# Patient Record
Sex: MALE | Race: WHITE | NOT HISPANIC OR LATINO | Employment: OTHER | URBAN - METROPOLITAN AREA
[De-identification: names, ages, dates, MRNs, and addresses within clinical notes are randomized per-mention and may not be internally consistent; named-entity substitution may affect disease eponyms.]

---

## 2018-11-14 PROCEDURE — 99285 EMERGENCY DEPT VISIT HI MDM: CPT

## 2018-11-15 ENCOUNTER — HOSPITAL ENCOUNTER (INPATIENT)
Facility: HOSPITAL | Age: 53
LOS: 5 days | Discharge: HOME | DRG: 885 | End: 2018-11-20
Attending: PSYCHIATRY & NEUROLOGY | Admitting: PSYCHIATRY & NEUROLOGY

## 2018-11-15 ENCOUNTER — HOSPITAL ENCOUNTER (EMERGENCY)
Facility: HOSPITAL | Age: 53
End: 2018-11-15
Attending: EMERGENCY MEDICINE | Admitting: EMERGENCY MEDICINE

## 2018-11-15 VITALS
WEIGHT: 195 LBS | RESPIRATION RATE: 18 BRPM | DIASTOLIC BLOOD PRESSURE: 69 MMHG | OXYGEN SATURATION: 98 % | TEMPERATURE: 99 F | SYSTOLIC BLOOD PRESSURE: 114 MMHG | HEART RATE: 78 BPM | BODY MASS INDEX: 30.54 KG/M2

## 2018-11-15 DIAGNOSIS — G47.00 INSOMNIA: ICD-10-CM

## 2018-11-15 DIAGNOSIS — F32.A DEPRESSION: ICD-10-CM

## 2018-11-15 DIAGNOSIS — R45.851 SUICIDAL IDEATION: ICD-10-CM

## 2018-11-15 DIAGNOSIS — R45.851 SUICIDAL IDEATIONS: ICD-10-CM

## 2018-11-15 DIAGNOSIS — R45.851 SUICIDAL IDEATIONS: Primary | ICD-10-CM

## 2018-11-15 DIAGNOSIS — F33.2 SEVERE EPISODE OF RECURRENT MAJOR DEPRESSIVE DISORDER, WITHOUT PSYCHOTIC FEATURES (HCC): Primary | ICD-10-CM

## 2018-11-15 DIAGNOSIS — F41.9 ANXIETY: ICD-10-CM

## 2018-11-15 DIAGNOSIS — F41.1 GENERALIZED ANXIETY DISORDER: ICD-10-CM

## 2018-11-15 LAB
ALBUMIN SERPL BCP-MCNC: 3.8 G/DL (ref 3.5–5)
ALP SERPL-CCNC: 70 U/L (ref 46–116)
ALT SERPL W P-5'-P-CCNC: 51 U/L (ref 12–78)
AMPHETAMINES SERPL QL SCN: NEGATIVE
ANION GAP SERPL CALCULATED.3IONS-SCNC: 9 MMOL/L (ref 4–13)
APAP SERPL-MCNC: 4 UG/ML (ref 10–30)
AST SERPL W P-5'-P-CCNC: 20 U/L (ref 5–45)
BARBITURATES UR QL: NEGATIVE
BASOPHILS # BLD AUTO: 0.04 THOUSANDS/ΜL (ref 0–0.1)
BASOPHILS NFR BLD AUTO: 0 % (ref 0–1)
BENZODIAZ UR QL: POSITIVE
BILIRUB SERPL-MCNC: 0.3 MG/DL (ref 0.2–1)
BUN SERPL-MCNC: 30 MG/DL (ref 5–25)
CALCIUM SERPL-MCNC: 9.1 MG/DL (ref 8.3–10.1)
CHLORIDE SERPL-SCNC: 104 MMOL/L (ref 100–108)
CO2 SERPL-SCNC: 26 MMOL/L (ref 21–32)
COCAINE UR QL: NEGATIVE
CREAT SERPL-MCNC: 1.3 MG/DL (ref 0.6–1.3)
EOSINOPHIL # BLD AUTO: 0.19 THOUSAND/ΜL (ref 0–0.61)
EOSINOPHIL NFR BLD AUTO: 2 % (ref 0–6)
ERYTHROCYTE [DISTWIDTH] IN BLOOD BY AUTOMATED COUNT: 12.1 % (ref 11.6–15.1)
ETHANOL SERPL-MCNC: <3 MG/DL (ref 0–3)
GAS + CO PNL BLDA: 2.5 % (ref 0–1.5)
GFR SERPL CREATININE-BSD FRML MDRD: 62 ML/MIN/1.73SQ M
GLUCOSE SERPL-MCNC: 105 MG/DL (ref 65–140)
HCT VFR BLD AUTO: 46 % (ref 36.5–49.3)
HGB BLD-MCNC: 15.7 G/DL (ref 12–17)
IMM GRANULOCYTES # BLD AUTO: 0.08 THOUSAND/UL (ref 0–0.2)
IMM GRANULOCYTES NFR BLD AUTO: 1 % (ref 0–2)
LYMPHOCYTES # BLD AUTO: 2.49 THOUSANDS/ΜL (ref 0.6–4.47)
LYMPHOCYTES NFR BLD AUTO: 23 % (ref 14–44)
MCH RBC QN AUTO: 30.1 PG (ref 26.8–34.3)
MCHC RBC AUTO-ENTMCNC: 34.1 G/DL (ref 31.4–37.4)
MCV RBC AUTO: 88 FL (ref 82–98)
METHADONE UR QL: NEGATIVE
MONOCYTES # BLD AUTO: 1.07 THOUSAND/ΜL (ref 0.17–1.22)
MONOCYTES NFR BLD AUTO: 10 % (ref 4–12)
NEUTROPHILS # BLD AUTO: 7.1 THOUSANDS/ΜL (ref 1.85–7.62)
NEUTS SEG NFR BLD AUTO: 64 % (ref 43–75)
NRBC BLD AUTO-RTO: 0 /100 WBCS
OPIATES UR QL SCN: NEGATIVE
PCP UR QL: NEGATIVE
PLATELET # BLD AUTO: 229 THOUSANDS/UL (ref 149–390)
PMV BLD AUTO: 8.5 FL (ref 8.9–12.7)
POTASSIUM SERPL-SCNC: 4.1 MMOL/L (ref 3.5–5.3)
PROT SERPL-MCNC: 6.7 G/DL (ref 6.4–8.2)
RBC # BLD AUTO: 5.21 MILLION/UL (ref 3.88–5.62)
SALICYLATES SERPL-MCNC: <3 MG/DL (ref 3–20)
SODIUM SERPL-SCNC: 139 MMOL/L (ref 136–145)
THC UR QL: NEGATIVE
TSH SERPL DL<=0.05 MIU/L-ACNC: 3.05 UIU/ML (ref 0.36–3.74)
WBC # BLD AUTO: 10.97 THOUSAND/UL (ref 4.31–10.16)

## 2018-11-15 PROCEDURE — 80053 COMPREHEN METABOLIC PANEL: CPT | Performed by: EMERGENCY MEDICINE

## 2018-11-15 PROCEDURE — 85025 COMPLETE CBC W/AUTO DIFF WBC: CPT | Performed by: EMERGENCY MEDICINE

## 2018-11-15 PROCEDURE — 36415 COLL VENOUS BLD VENIPUNCTURE: CPT | Performed by: EMERGENCY MEDICINE

## 2018-11-15 PROCEDURE — 80329 ANALGESICS NON-OPIOID 1 OR 2: CPT | Performed by: EMERGENCY MEDICINE

## 2018-11-15 PROCEDURE — 84443 ASSAY THYROID STIM HORMONE: CPT | Performed by: EMERGENCY MEDICINE

## 2018-11-15 PROCEDURE — 80320 DRUG SCREEN QUANTALCOHOLS: CPT | Performed by: EMERGENCY MEDICINE

## 2018-11-15 PROCEDURE — 99252 IP/OBS CONSLTJ NEW/EST SF 35: CPT | Performed by: PHYSICIAN ASSISTANT

## 2018-11-15 PROCEDURE — 82375 ASSAY CARBOXYHB QUANT: CPT | Performed by: EMERGENCY MEDICINE

## 2018-11-15 PROCEDURE — 80307 DRUG TEST PRSMV CHEM ANLYZR: CPT | Performed by: EMERGENCY MEDICINE

## 2018-11-15 PROCEDURE — 99223 1ST HOSP IP/OBS HIGH 75: CPT | Performed by: PSYCHIATRY & NEUROLOGY

## 2018-11-15 RX ORDER — MAGNESIUM HYDROXIDE/ALUMINUM HYDROXICE/SIMETHICONE 120; 1200; 1200 MG/30ML; MG/30ML; MG/30ML
30 SUSPENSION ORAL EVERY 4 HOURS PRN
Status: CANCELLED | OUTPATIENT
Start: 2018-11-15

## 2018-11-15 RX ORDER — OLANZAPINE 5 MG/1
5 TABLET ORAL
Status: DISCONTINUED | OUTPATIENT
Start: 2018-11-15 | End: 2018-11-17

## 2018-11-15 RX ORDER — BENZTROPINE MESYLATE 1 MG/1
1 TABLET ORAL EVERY 6 HOURS PRN
Status: DISCONTINUED | OUTPATIENT
Start: 2018-11-15 | End: 2018-11-20 | Stop reason: HOSPADM

## 2018-11-15 RX ORDER — DIAZEPAM 5 MG/1
5 TABLET ORAL EVERY 6 HOURS PRN
COMMUNITY
End: 2018-11-20 | Stop reason: HOSPADM

## 2018-11-15 RX ORDER — TRAZODONE HYDROCHLORIDE 50 MG/1
50 TABLET ORAL
Status: DISCONTINUED | OUTPATIENT
Start: 2018-11-15 | End: 2018-11-20 | Stop reason: HOSPADM

## 2018-11-15 RX ORDER — DIAZEPAM 5 MG/1
5 TABLET ORAL ONCE
Status: DISCONTINUED | OUTPATIENT
Start: 2018-11-15 | End: 2018-11-15 | Stop reason: HOSPADM

## 2018-11-15 RX ORDER — ACETAMINOPHEN 325 MG/1
650 TABLET ORAL EVERY 6 HOURS PRN
Status: CANCELLED | OUTPATIENT
Start: 2018-11-15

## 2018-11-15 RX ORDER — HYDROXYZINE HYDROCHLORIDE 25 MG/1
25 TABLET, FILM COATED ORAL EVERY 4 HOURS PRN
Status: CANCELLED | OUTPATIENT
Start: 2018-11-15

## 2018-11-15 RX ORDER — BENZTROPINE MESYLATE 1 MG/ML
1 INJECTION INTRAMUSCULAR; INTRAVENOUS EVERY 6 HOURS PRN
Status: DISCONTINUED | OUTPATIENT
Start: 2018-11-15 | End: 2018-11-20 | Stop reason: HOSPADM

## 2018-11-15 RX ORDER — LORAZEPAM 1 MG/1
1 TABLET ORAL EVERY 4 HOURS PRN
Status: DISCONTINUED | OUTPATIENT
Start: 2018-11-15 | End: 2018-11-20 | Stop reason: HOSPADM

## 2018-11-15 RX ORDER — MAGNESIUM HYDROXIDE/ALUMINUM HYDROXICE/SIMETHICONE 120; 1200; 1200 MG/30ML; MG/30ML; MG/30ML
30 SUSPENSION ORAL EVERY 4 HOURS PRN
Status: DISCONTINUED | OUTPATIENT
Start: 2018-11-15 | End: 2018-11-20 | Stop reason: HOSPADM

## 2018-11-15 RX ORDER — LORAZEPAM 2 MG/ML
2 INJECTION INTRAMUSCULAR EVERY 4 HOURS PRN
Status: CANCELLED | OUTPATIENT
Start: 2018-11-15

## 2018-11-15 RX ORDER — TRAZODONE HYDROCHLORIDE 50 MG/1
50 TABLET ORAL
Status: CANCELLED | OUTPATIENT
Start: 2018-11-15

## 2018-11-15 RX ORDER — ACETAMINOPHEN 325 MG/1
650 TABLET ORAL EVERY 6 HOURS PRN
Status: DISCONTINUED | OUTPATIENT
Start: 2018-11-15 | End: 2018-11-20 | Stop reason: HOSPADM

## 2018-11-15 RX ORDER — BENZTROPINE MESYLATE 1 MG/ML
1 INJECTION INTRAMUSCULAR; INTRAVENOUS EVERY 6 HOURS PRN
Status: CANCELLED | OUTPATIENT
Start: 2018-11-15

## 2018-11-15 RX ORDER — ESCITALOPRAM OXALATE 5 MG/1
5 TABLET ORAL DAILY
Status: DISCONTINUED | OUTPATIENT
Start: 2018-11-15 | End: 2018-11-16

## 2018-11-15 RX ORDER — ZIPRASIDONE HYDROCHLORIDE 80 MG/1
80 CAPSULE ORAL 2 TIMES DAILY WITH MEALS
COMMUNITY
End: 2018-11-20 | Stop reason: HOSPADM

## 2018-11-15 RX ORDER — BENZTROPINE MESYLATE 0.5 MG/1
1 TABLET ORAL EVERY 6 HOURS PRN
Status: CANCELLED | OUTPATIENT
Start: 2018-11-15

## 2018-11-15 RX ORDER — HYDROXYZINE HYDROCHLORIDE 25 MG/1
25 TABLET, FILM COATED ORAL EVERY 4 HOURS PRN
Status: DISCONTINUED | OUTPATIENT
Start: 2018-11-15 | End: 2018-11-20 | Stop reason: HOSPADM

## 2018-11-15 RX ORDER — LORAZEPAM 1 MG/1
1 TABLET ORAL EVERY 4 HOURS PRN
Status: CANCELLED | OUTPATIENT
Start: 2018-11-15

## 2018-11-15 RX ORDER — OLANZAPINE 2.5 MG/1
5 TABLET ORAL
Status: CANCELLED | OUTPATIENT
Start: 2018-11-15

## 2018-11-15 RX ORDER — LORAZEPAM 2 MG/ML
2 INJECTION INTRAMUSCULAR EVERY 4 HOURS PRN
Status: DISCONTINUED | OUTPATIENT
Start: 2018-11-15 | End: 2018-11-20 | Stop reason: HOSPADM

## 2018-11-15 RX ADMIN — ESCITALOPRAM OXALATE 5 MG: 5 TABLET, FILM COATED ORAL at 11:46

## 2018-11-15 RX ADMIN — TRAZODONE HYDROCHLORIDE 50 MG: 50 TABLET ORAL at 22:04

## 2018-11-15 RX ADMIN — LORAZEPAM 1 MG: 1 TABLET ORAL at 15:06

## 2018-11-15 NOTE — ED NOTES
Pt laying on bed resting watching TV  Will continue to monitor pt        Robles Marino  11/15/18 8896

## 2018-11-15 NOTE — CASE MANAGEMENT
CM outreached to Psychiatry- [facility] located @ [address] [place] [address], [address] [place] [address] [place]) [phone] ([phone] and spoke w/ Ever Betancourt who confirmed that PT was seen yesterday for the 1st time by Yo Mccauley  CM provided admission notification and Ever Betancourt communicated that the office was concerned about PT and were in support of PT's I/P [facility] admission  Ever Betancourt agreed to update the staff and CAMDEN agreed to be in contact regarding progression in stabilization and D/C planning

## 2018-11-15 NOTE — CONSULTS
Consultation - Nikky Wilson 46 y o  male MRN: [id number]    Unit/Bed#: [facility] 258-01 Encounter: [id number]      Assessment/Plan   Depression with SI  Medically cleared for inpatient psychiatric evaluation and treatment    History of Present Illness   HPI: Nikky Wilson is a 46y o  year old male who presents with increased depression and SI  He denies chronic health conditions, denies recent illness, open wounds or pain  Inpatient consult for Medical Clearance for [facility] patient  Consult performed by: Rickie Hickey  Consult ordered by: Melanie Jackson          Review of Systems   Constitutional: Negative for activity change, appetite change, chills, fatigue and fever  HENT: Negative  Eyes: Negative  Respiratory: Negative  Cardiovascular: Negative  Gastrointestinal: Negative  Genitourinary: Negative  Musculoskeletal: Negative  Neurological: Negative  Psychiatric/Behavioral: Positive for decreased concentration, dysphoric mood and suicidal ideas  Historical Information   Past Medical History:   Diagnosis Date    Anxiety     Depression      No past surgical history on file  Social History   History   Alcohol Use No     History   Drug Use No     History   Smoking Status    Never Smoker   Smokeless Tobacco    Not on file     Family History: non-contributory    Meds/Allergies   PTA meds:   Prior to Admission Medications   Prescriptions Last Dose Informant Patient Reported? Taking?   diazepam (VALIUM) 5 mg tablet   Yes No   Sig: Take 5 mg by mouth every 6 (six) hours as needed for anxiety   ziprasidone (GEODON) 80 mg capsule   Yes No   Sig: Take 80 mg by mouth 2 (two) times a day with meals      Facility-Administered Medications: None     No Known Allergies    Objective   Vitals: Blood pressure 115/56, pulse 68, temperature 98 °F (36 7 °C), temperature source Tympanic, resp  rate 18, SpO2 94 %    No intake or output data in the 24 hours ending 11/15/18 1129  Invasive Devices No matching active lines, drains, or airways          Physical Exam   Constitutional: He is oriented to person, place, and time  He appears well-developed and well-nourished  No distress  HENT:   Head: Normocephalic and atraumatic  Eyes: Pupils are equal, round, and reactive to light  EOM are normal    Neck: Normal range of motion  Cardiovascular: Normal rate and regular rhythm  Exam reveals no gallop and no friction rub  No murmur heard  Pulmonary/Chest: Effort normal  He has no wheezes  He has no rales  Abdominal: Soft  He exhibits no distension  There is no tenderness  There is no rebound  Musculoskeletal: Normal range of motion  Neurological: He is alert and oriented to person, place, and time  Skin: Skin is warm  Psychiatric: Thought content normal  His speech is delayed  He is slowed  He exhibits a depressed mood  Lab Results: I have personally reviewed pertinent reports  Imaging Studies: I have personally reviewed pertinent reports

## 2018-11-15 NOTE — ED NOTES
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

## 2018-11-15 NOTE — ED NOTES
Report called to Carol at [facility]  Pt  Left dept  , awake and alert, no distress        Nargis John, RN  11/15/18 1974

## 2018-11-15 NOTE — PROGRESS NOTES
201 from [place] Kumar for SI with plan to poison self with carbon monoxide from his car  States he did not act on his plan  CO2 levels elevated in his ED lab work  States it's from the ambulance exhaust  Reports SI off and on since divorce in 2015  Additional stressors are his job and not seeing his kids  Treating outpatient with Valium and Geodon for anxiety and depression  First inpatient hospitalization  Good support system  UDS neg  Upon admission patient appears very depressed but is cooperative  Denies current SI/HI/AVH  Seclusive to room but comes out for meals  Observed skin irritation under L breast  Pt states he was shaved for an EKG prior to admission

## 2018-11-15 NOTE — CASE MANAGEMENT
PT was referred to [address] on a 201 from [facility] after PT presented to the ED by EMS after the police were allegedly notified by PT's  after PT called his  where PT reported SI w/ a plan to take pills and sit in his car w/ the garage w/ the windows up for carbon monoxide poisioning  Records indicate that PT took (2) Geodon, (2) Tylenol PM, (2) sleeping pills, and (2) valium as a SA  Records indicate PT has struggled w/ SI since 2015 when PT got  and reports that since then he feels like his children have all turned against him and reported estranged relationships  CM met w/ PT today  CM reviewed PT's [place] plan  PT signed [address] [id number] plan  Copy of [address] [address] plan placed to be filed in medical records  PT confirms that he resides at [address] [facility] [address] [facility] in [facility] w/ his dog  PT reports that his Aunt and Cyrena Linden are caring for PT's dog at this time  PT reports that upon D/C from [address] [place] [address], he is able to return to his residence  PT reports that the best telephone number to get in contact w/ PT is (c) [phone]  PT confirms having an active drivers licenses and confirms having his own means of transportation  PT reports that upon D/C from I/P [facility], PT's family will be able to support PT in transportation services home  PT reports that he just saw a psychiatrist yesterday for the first time named Dr Albin Boyce, in [place], [place]  PT denies knowing the name of the practice  CM believes that this could be Dr Long Been in [place], [place]  PT agreed to allow CM to call to verify this  PT reports having a PCP named Dr [place] Bob  PT denies having any other forms of community service providers including therapy, community case management, peer supports, etc      PT denies having a HX of I/P [facility] admissions and denies having a HX of O/P [address] [place] [address]; however, MD informed CM that PT disclosed that PT is a previous PT of psychiatrist Dr Clem Bamberger       PT reports that he is currently self-employed as a part-time   PT confirms that he is currently insured through a private health insurance of [employer]  PT denies having behavioral health coverage  PT confirms having active RX coverage and reports using [facility] in [place], [place] as a local pharmacy  CM reviewed the process for a referral to [facility] to assist PT in applying for MA for PT's hospitalization, etc  PT verbalized being in support of this planning  PT denies having any medical issues  PT denies having a HX of any seizures  PT reports that he is legally  w/ (3) adult children (1) son and (2) daughters that PT reports having an estranged relationship w/ as a result of the divorce  PT reports having natural supports through both immediate and extended family including PT's parents, Aunt, and Cousin  PT reports having some friends as support and also reports being a member of a local [employer] that PT receives support through  PT denies having a family HX of any mental illness or drug and alcohol addiction  PT reports that he identifies as Anglican in regards to Jain and/or spiritual beliefs  PT reports that his highest level of completed education is law school [employer] graduated in [employer]  PT denies having any access to any weapons or firearms  PT denies having a legal POA, legal guardian, mental health advanced directive, rep-payee, etc      PT denies any issues related to drugs and alcohol and declines the need for a referral  UDS upon admission was (+) BZO  CM reviewed different levels of services w/ PT and PT reports being open to O/P [address] [place] including psychiatric medication management, therapy, and PCP  CM agreed to outreach to PT's natural supports and providers and follow up w/ PT accordingly regarding progression in stabilization and D/C planning       PT signed SERENA's for the following:     PCP- Jose Alberto Snyder located @ [address] [place] [address] V) [phone] (L) [phone]    Cousin- Saran Schneider (Address Unknown) (B) [phone]    Psychiatry- [facility] located @ [address] [facility] [place], [address] [place] [address] N) [phone] (J) [phone]

## 2018-11-15 NOTE — PROGRESS NOTES
Medication note:  Patient received ativan 1 mg at 1500 after c/o increased anxiety  Follow up with patient at 1415 and pt in room lying down, states feeling better with less anxiety

## 2018-11-15 NOTE — ED PROVIDER NOTES
History  Chief Complaint   Patient presents with    Psychiatric Evaluation     Patient brought in by EMS for SI  Per EMS, PD was called to patient's house where he had a plan to take pills and sit in his car in the garage with windows up  Patient stated, "I guess the  I spoke with earlier called the police " Does admit to having thoughts of SI with plan to take pills and "kill myself by carbon monoxide poisioning " Patient did take 2 tablets of geodon, 2 tablets of tylenol pm, 2 tabs of "a sleeping pill", and 2 tablets of valium tonight to go along with his plan  No HI  Patient is a 46year old male with increased depression and suicidal ideation tonight  Has had this for years  Thoughts of dying by carbon monoxide poisoning  Took 2 pills of multiple medications tonight  No N/V  No fever  (+) anxiety  No recent old records from this ED seen on computer system  [facility] website checked on this patient and last Rx filled was on 10/13/18 for valium for 20 day supply  History provided by:  Patient and EMS personnel   used: No    Psychiatric Evaluation   Presenting symptoms: suicidal thoughts    Associated symptoms: anxiety        Prior to Admission Medications   Prescriptions Last Dose Informant Patient Reported? Taking?   diazepam (VALIUM) 5 mg tablet   Yes Yes   Sig: Take 5 mg by mouth every 6 (six) hours as needed for anxiety   ziprasidone (GEODON) 80 mg capsule   Yes Yes   Sig: Take 80 mg by mouth 2 (two) times a day with meals      Facility-Administered Medications: None       Past Medical History:   Diagnosis Date    Anxiety     Depression        History reviewed  No pertinent surgical history  History reviewed  No pertinent family history  I have reviewed and agree with the history as documented      Social History   Substance Use Topics    Smoking status: Never Smoker    Smokeless tobacco: Not on file    Alcohol use No        Review of Systems Constitutional: Negative for fever  Gastrointestinal: Negative for nausea and vomiting  Psychiatric/Behavioral: Positive for dysphoric mood and suicidal ideas  The patient is nervous/anxious  All other systems reviewed and are negative  Physical Exam  Physical Exam   Constitutional: He is oriented to person, place, and time  He appears well-developed and well-nourished  He appears distressed (moderate)  HENT:   Head: Normocephalic and atraumatic  Mouth/Throat: Oropharynx is clear and moist    Eyes: No scleral icterus  Neck: Normal range of motion  Neck supple  Cardiovascular: Regular rhythm and normal heart sounds  No murmur heard  Tachycardia  Pulmonary/Chest: Effort normal and breath sounds normal  No stridor  No respiratory distress  Abdominal: Soft  Bowel sounds are normal  There is no tenderness  Musculoskeletal: He exhibits no edema or deformity  Neurological: He is alert and oriented to person, place, and time  No focal deficits  Normal gait  Skin: Skin is warm and dry  No rash noted  Psychiatric:   Anxious  Flat affect  Nursing note and vitals reviewed        Vital Signs  ED Triage Vitals [11/15/18 0009]   Temperature Pulse Respirations Blood Pressure SpO2   99 °F (37 2 °C) 100 18 125/84 99 %      Temp Source Heart Rate Source Patient Position - Orthostatic VS BP Location FiO2 (%)   Oral Monitor Sitting Right arm --      Pain Score       --           Vitals:    11/15/18 0009   BP: 125/84   Pulse: 100   Patient Position - Orthostatic VS: Sitting       Visual Acuity      ED Medications  Medications   diazepam (VALIUM) tablet 5 mg (not administered)       Diagnostic Studies  Results Reviewed     Procedure Component Value Units Date/Time    TSH [[id number]]  (Normal) Collected:  11/15/18 0038    Lab Status:  Final result Specimen:  Blood from Arm, Right Updated:  11/15/18 0111     TSH 3RD GENERATON 3 051 uIU/mL     Narrative:         Patients undergoing fluorescein dye angiography may retain small amounts of fluorescein in the body for 48-72 hours post procedure  Samples containing fluorescein can produce falsely depressed TSH values  If the patient had this procedure,a specimen should be resubmitted post fluorescein clearance  Salicylate level [[id number]]  (Abnormal) Collected:  11/15/18 0038    Lab Status:  Final result Specimen:  Blood from Arm, Right Updated:  79/85/26 0171     Salicylate Lvl <3 (L) mg/dL     Acetaminophen level [[id number]]  (Abnormal) Collected:  11/15/18 0038    Lab Status:  Final result Specimen:  Blood from Arm, Right Updated:  11/15/18 0111     Acetaminophen Level 4 0 (L) ug/mL     Ethanol [[id number]]  (Normal) Collected:  11/15/18 0038    Lab Status:  Final result Specimen:  Blood from Arm, Right Updated:  11/15/18 0108     Ethanol Lvl <3 mg/dL     Comprehensive metabolic panel [[id number]]  (Abnormal) Collected:  11/15/18 0038    Lab Status:  Final result Specimen:  Blood from Arm, Right Updated:  11/15/18 0105     Sodium 139 mmol/L      Potassium 4 1 mmol/L      Chloride 104 mmol/L      CO2 26 mmol/L      ANION GAP 9 mmol/L      BUN 30 (H) mg/dL      Creatinine 1 30 mg/dL      Glucose 105 mg/dL      Calcium 9 1 mg/dL      AST 20 U/L      ALT 51 U/L      Alkaline Phosphatase 70 U/L      Total Protein 6 7 g/dL      Albumin 3 8 g/dL      Total Bilirubin 0 30 mg/dL      eGFR 62 ml/min/1 73sq m     Narrative:         National Kidney Disease Education Program recommendations are as follows:  GFR calculation is accurate only with a steady state creatinine  Chronic Kidney disease less than 60 ml/min/1 73 sq  meters  Kidney failure less than 15 ml/min/1 73 sq  meters      Rapid drug screen, urine [[id number]]  (Abnormal) Collected:  11/15/18 0031    Lab Status:  Final result Specimen:  Urine from Urine, Clean Catch Updated:  11/15/18 0056     Amph/Meth UR Negative     Barbiturate Ur Negative     Benzodiazepine Urine Positive (A)     Cocaine Urine Negative Methadone Urine Negative     Opiate Urine Negative     PCP Ur Negative     THC Urine Negative    Narrative:         Presumptive report  If requested, specimen will be sent to reference lab for confirmation  FOR MEDICAL PURPOSES ONLY  IF CONFIRMATION NEEDED PLEASE CONTACT THE LAB WITHIN 5 DAYS  Drug Screen Cutoff Levels:  AMPHETAMINE/METHAMPHETAMINES  1000 ng/mL  BARBITURATES     200 ng/mL  BENZODIAZEPINES     200 ng/mL  COCAINE      300 ng/mL  METHADONE      300 ng/mL  OPIATES      300 ng/mL  PHENCYCLIDINE     25 ng/mL  THC       50 ng/mL    Carboxyhemoglobin [[id number]]  (Abnormal) Collected:  11/15/18 0038    Lab Status:  Final result Specimen:  Blood from Arm, Right Updated:  11/15/18 0047     Carbon Monoxide, Blood 2 5 (H) %     Narrative:        Therapeutic levels (1 mg/mL and 2 mg/mL) of hydroxocobalamin may interfere with the fCOHb and fMetHb where it may cause lower than expected values  Normal Carboxyhemoglobin range for nonsmokers is <1 5%   Normal Carboxyhemoglobin range for smokers is 1 5% to 5 1%     CBC and differential [[id number]]  (Abnormal) Collected:  11/15/18 0038    Lab Status:  Final result Specimen:  Blood from Arm, Right Updated:  11/15/18 0046     WBC 10 97 (H) Thousand/uL      RBC 5 21 Million/uL      Hemoglobin 15 7 g/dL      Hematocrit 46 0 %      MCV 88 fL      MCH 30 1 pg      MCHC 34 1 g/dL      RDW 12 1 %      MPV 8 5 (L) fL      Platelets 538 Thousands/uL      nRBC 0 /100 WBCs      Neutrophils Relative 64 %      Immat GRANS % 1 %      Lymphocytes Relative 23 %      Monocytes Relative 10 %      Eosinophils Relative 2 %      Basophils Relative 0 %      Neutrophils Absolute 7 10 Thousands/µL      Immature Grans Absolute 0 08 Thousand/uL      Lymphocytes Absolute 2 49 Thousands/µL      Monocytes Absolute 1 07 Thousand/µL      Eosinophils Absolute 0 19 Thousand/µL      Basophils Absolute 0 04 Thousands/µL                  No orders to display              Procedures  ECG 12 Lead Documentation  Date/Time: 11/15/2018 1:03 AM  Performed by: Humza Vela  Authorized by: Humza Vela     Indications / Diagnosis:  Overdose, psych clearance  ECG reviewed by me, the ED Provider: yes    Patient location:  ED  Previous ECG:     Previous ECG:  Unavailable  Quality:     Tracing quality:  Limited by artifact  Rate:     ECG rate:  82    ECG rate assessment: normal    Rhythm:     Rhythm: sinus rhythm    Ectopy:     Ectopy: none    QRS:     QRS axis:  Normal    QRS intervals:  Normal  Conduction:     Conduction: normal    ST segments:     ST segments:  Normal  T waves:     T waves: normal             Phone Contacts  ED Phone Contact    ED Course  ED Course as of Nov 15 0352   Thu Nov 15, 2018   0126 Patient is medically acceptable for crisis evaluation/dispositioning  D/w crisis worker who will see patient in ED  Labs d/w patient  3898 Patient signed a 201      0313 Valium ordered for insomnia  MDM  Number of Diagnoses or Management Options  Diagnosis management comments: DDx including but not limited to: depression, anxiety, PTSD, bipolar disorder, suicidal ideation, schizophrenia, schizoaffective disorder, personality disorder, substance abuse, metabolic abnormality, thyroid disease, overdose          Amount and/or Complexity of Data Reviewed  Clinical lab tests: ordered and reviewed  Decide to obtain previous medical records or to obtain history from someone other than the patient: yes  Obtain history from someone other than the patient: yes  Independent visualization of images, tracings, or specimens: yes      CritCare Time    Disposition  Final diagnoses:   Suicidal ideation   Depression   Anxiety     Time reflects when diagnosis was documented in both MDM as applicable and the Disposition within this note     Time User Action Codes Description Comment    11/15/2018  3:05 AM Julio WALKER Add [B93 169] Suicidal ideations     11/15/2018  3:05 AM David Alvarado Modify [T61 702] Suicidal ideations     11/15/2018  3:05 AM Pamela Velez AARON Modify [V10 655] Suicidal ideations     11/15/2018  3:51 AM Sathya Yolanda Add [E39 957] Suicidal ideation     11/15/2018  3:51 AM Sathya Yolanda Add [F32 9] Depression     11/15/2018  3:51 AM Sathya Alexander Add [F41 9] Anxiety       ED Disposition     ED Disposition Condition Comment    Transfer to [facility] should be transferred out to [facility] this AM and has been medically cleared  MD Documentation      Most Recent Value   Accepting Physician  [address] Name, [address] [place]    (Name & Tel number)  Brandy Mike [phone]   Transported by (Company and Unit #)  [employer]    Sending MD Lisbeth Saeed MD   Provider Certification  General risk, such as traffic hazards, adverse weather conditions, rough terrain or turbulence, possible failure of equipment (including vehicle or aircraft), or consequences of actions of persons outside the control of the transport personnel      RN Documentation      Most [facility] Name, [address] [facility]    (Name & Tel number)  Brandy Mike [phone]   Transported by (Company and Unit #)  [employer]       Follow-up Information    None         Patient's Medications   Discharge Prescriptions    No medications on file     No discharge procedures on file      ED Provider  Electronically Signed by           Jennifer Cavanaugh MD  11/15/18 9976

## 2018-11-15 NOTE — CASE MANAGEMENT
CM outreached to PT's PCP- Urbano Levy located @ [address] [place] [address] (J) [phone] (B) [phone] and reached the answering service  CM will call back at another time

## 2018-11-15 NOTE — ED NOTES
PC placed to [facility] pt is not on file  Pt confirms that he does not have insurance at this time   Pt is to be reviewed by [facility] for placement

## 2018-11-15 NOTE — ED NOTES
Pt changed into paper scrubs and personal clothes collected  Pt provided urine sample        Ruta Bloch Z Villafane  11/15/18 0040

## 2018-11-15 NOTE — ED NOTES
Patient is accepted at [address]  Patient is accepted by Doug Vallejo  Per [address] is arranged with [facility]   Transportation is scheduled for 0800    Nurse report is to be called to [phone] prior to patient transfer

## 2018-11-15 NOTE — CONSULTS
TELEPSYCHIATRY TELEPHONE ADMISSION NOTE    I was consulted by phone to review the case of this 49yo man who was medically cleared and admitted for suicidal ideation  PMH negative for significant medical issues  No reported serious withdrawal reactions  NKDA  AVSS, reassuring admission labs  UDS+benzos/BAL negative  Plan:   --Admit to psychiatry  --Suicide precautions  --Routine admission orders placed

## 2018-11-15 NOTE — TREATMENT PLAN
TREATMENT PLAN REVIEW - [address] Kirstin Garcia 46 y o  1965 male MRN: [id number]    [address] [place] [address] Room / Bed: [place] [address]/[facility] 880-60 Encounter: [id number]          Admit Date/Time:  11/15/2018  8:58 AM    Treatment Team: Attending Provider: Macarena Vallejo; Patient Care Assistant: Km Garcia; : Marvie Fabry; Occupational Therapy Assistant: AN Sánchez    Diagnosis: Principal Problem:    Severe episode of recurrent major depressive disorder, without psychotic features ([facility] )  Active Problems:    Generalized anxiety disorder    Patient Strengths/Assets: cooperative, compliant with medication, motivation for treatment/growth, patient is on a voluntary commitment    Patient Barriers/Limitations: low self esteem    Short Term Goals: decrease in depressive symptoms, decrease in anxiety symptoms, decrease in suicidal thoughts    Long Term Goals: improvement in depression, improvement in anxiety, stabilization of mood, free of suicidal thoughts, acceptance of need for psychiatric medications, acceptance of need for psychiatric treatment, acceptance of need for psychiatric follow up after discharge    Progress Towards Goals: starting psychiatric medications as prescribed    Recommended Treatment: medication management, patient medication education, group therapy, milieu therapy, continued Behavioral Health psychiatric evaluation/assessment process    Treatment Frequency: daily medication monitoring, group and milieu therapy daily, monitoring through interdisciplinary rounds, monitoring through weekly patient care conferences    Expected Discharge Date: 5-7 days    Discharge Plan: referral for outpatient medication management with a psychiatrist, referral for outpatient psychotherapy    Treatment Plan Created/Updated By: Macarena Vallejo

## 2018-11-15 NOTE — ED NOTES
PC placed to [phone] where an automated message stated their office was closed until the AM  Unable to verify insurance at this time

## 2018-11-15 NOTE — EMTALA/ACUTE CARE TRANSFER
[address] [address] [address]  Dept: [phone]      EMTALA TRANSFER CONSENT    NAME Paulo Ortiz                                         1965                              MRN [id number]    I have been informed of my rights regarding examination, treatment, and transfer   by Dr Rafael Franz MD    Benefits:      Risks:        Consent for Transfer:  I acknowledge that my medical condition has been evaluated and explained to me by the emergency department physician or other qualified medical person and/or my attending physician, who has recommended that I be transferred to the service of  Accepting Physician: Uziel Omalley at [address] Name, [address] : [facility]  The above potential benefits of such transfer, the potential risks associated with such transfer, and the probable risks of not being transferred have been explained to me, and I fully understand them  The doctor has explained that, in my case, the benefits of transfer outweigh the risks  I agree to be transferred  I authorize the performance of emergency medical procedures and treatments upon me in both transit and upon arrival at the receiving facility  Additionally, I authorize the release of any and all medical records to the receiving facility and request they be transported with me, if possible  I understand that the safest mode of transportation during a medical emergency is an ambulance and that the Hospital advocates the use of this mode of transport  Risks of traveling to the receiving facility by car, including absence of medical control, life sustaining equipment, such as oxygen, and medical personnel has been explained to me and I fully understand them  (BRANNON CORRECT BOX BELOW)  [ X ]  I consent to the stated transfer and to be transported by ambulance/helicopter    [  ]  I consent to the stated transfer, but refuse transportation by ambulance and accept full responsibility for my transportation by car  I understand the risks of non-ambulance transfers and I exonerate the Hospital and its staff from any deterioration in my condition that results from this refusal     X___________________________________________    DATE  11/15/18  TIME________  Signature of patient or legally responsible individual signing on patient behalf           RELATIONSHIP TO PATIENT_________________________          Provider Certification    NAME Joelle Quezada                                         1965                              MRN [id number]    A medical screening exam was performed on the above named patient  Based on the examination:    Condition Necessitating Transfer The primary encounter diagnosis was Suicidal ideations  Diagnoses of Suicidal ideation, Depression, and Anxiety were also pertinent to this visit  Patient Condition:      Reason for Transfer:      Transfer Requirements: Facility [facility]   · Space available and qualified personnel available for treatment as acknowledged by Bj Paulson [phone]  · Agreed to accept transfer and to provide appropriate medical treatment as acknowledged by       Dr Campuzano  · Appropriate medical records of the examination and treatment of the patient are provided at the time of transfer   [address] [facility], [address] _______  · Transfer will be performed by qualified personnel from [facility]   and appropriate transfer equipment as required, including the use of necessary and appropriate life support measures      Provider Certification: I have examined the patient and explained the following risks and benefits of being transferred/refusing transfer to the patient/family:  General risk, such as traffic hazards, adverse weather conditions, rough terrain or turbulence, possible failure of equipment (including vehicle or aircraft), or consequences of actions of persons outside the control of the transport personnel      Based on these reasonable risks and benefits to the patient and/or the unborn child(eleanor), and based upon the information available at the time of the patients examination, I certify that the medical benefits reasonably to be expected from the provision of appropriate medical treatments at another medical facility outweigh the increasing risks, if any, to the individuals medical condition, and in the case of labor to the unborn child, from effecting the transfer      X____________________________________________ DATE 11/15/18        [id number]______  Eileen Krause MD    ORIGINAL - SEND TO MEDICAL RECORDS   COPY - SEND WITH PATIENT DURING TRANSFER

## 2018-11-15 NOTE — TREATMENT PLAN
Nursing staff with meet with patient at least twice a day to assess physical, mental and behavioral symptoms  Will educate on diagnosis, medication and alternative coping skills to better manage life stressors

## 2018-11-15 NOTE — ED NOTES
Pt laying on bed resting  TV on  Light off  Will continue to monitor pt       Brianne Santamaria  11/15/18 0111

## 2018-11-15 NOTE — H&P
Psychiatric Evaluation - [address] [place] [address] 46 y o  male MRN: [id number]  Unit/Bed#: U 258-01 Encounter: [id number]    Assessment/Plan   Principal Problem:    Severe episode of recurrent major depressive disorder, without psychotic features ([address] )  Active Problems:    Generalized anxiety disorder    Plan:   1  Check admission labs  2  Collaborate with family for baseline assessment and disposition planning  3  Discontinue Geodon as patient is not meeting criteria for bipolar disorder or psychotic disorder  4  Add Lexapro 5 mg daily for depression and anxiety management  Increase the dose to 10 mg daily if patient is tolerating this dose  5  Ativan 1 mg q 8 hours p r n  for anxiety management  Risks, benefits and possible side effects of Medications:   Risks, benefits, and possible side effects of medications explained to patient and patient verbalizes understanding  Chief Complaint: "I don't want to go on living like this"    History of Present Illness     Patient is a 46 y o  male presents on [place] with recent worsening of depression and anxiety  Patient reports recent overdose attempt on 2 tablets of Tylenol, Geodon, Valium and sleeping medication  Patient also reports having suicidal thoughts of attempting carbon monoxide poisoning by sitting in a car with garage door close  Patient reports worsening of depression since 2015 after divorce, but reports recent worsening of depression with low energy, increased sleep, psychomotor retardation, guilt, hopelessness and suicidal ideation  Patient reports getting prescription of Geodon for few years now after detailed evaluation patient denies endorsing current or past history of manic or psychotic symptoms  Patient reports taking Geodon with p r n  Need of Valium for anxiety at this time  During evaluation abnormal hand movements were noticed      AIMS was done in details:  Facial and oral movements: 0  Extremity movements: upper extremity: 2 (for twitching on forearm and shoulder noted)  Trunk movements: 0  Severity of abnormal movements overall: 2  Incapacitation due to abnormal movements: 2  Patients awareness of abnormal movements: 2    Most part of the later session was dedicated to educating patient on these movement as tardive dyskinesia from Geodon  Patient does report onset of these abnormal movement after initiation of Geodon  Patient agreed that he does not meet criteria for bipolar disorder but got disorder  Patient agreed with diagnosis of major depressive disorder and anxiety  Patient have parkinsonian gait and blunted affect during entire evaluation  Medical Review Of Systems:  hand tremors    Psychiatric Review Of Systems:  sleep: yes  appetite changes: yes  weight changes: no  energy/anergy: yes  interest/pleasure/anhedonia: yes  somatic symptoms: yes  anxiety/panic: yes  gamal: no  guilty/hopeless: yes  self injurious behavior/risky behavior: yes    Historical Information     Past Psychiatric History:   Denies prior inpatient psychiatric admissions  Currently in treatment with [facility]    Past Suicide attempts: no  Past Violent behavior: no  Past Psychiatric medication trial:  Geodon, Valium, Xanax    Substance Abuse History:  denies    Social History     Tobacco History     Smoking Status  Never Smoker    Smokeless Tobacco Use  Never Used          Alcohol History     Alcohol Use Status  No          Drug Use     Drug Use Status  No          Sexual Activity     Sexually Active  Not Asked          Activities of Daily Living    Not Asked               Additional Substance Use Detail     Questions Responses    Substance Use Assessment Denies substance use within the past 12 months    Alcohol Use Frequency Denies use in past 12 months    Cannabis frequency Never used    Comment: Never used on 11/15/2018     Cocaine frequency Never used    Comment: Never used on 11/15/2018     Crack Cocaine Frequency Denies use in past 12 months    Methamphetamine Frequency Denies use in past 12 months    Narcotic Frequency Denies use in past 12 months    Benzodiazepine Frequency Denies use in past 12 months    Amphetamine frequency Denies use in past 12 months    Barbituate Frequency Denies use use in past 12 months    Inhalant frequency Never used    Comment: Never used on 11/15/2018     Hallucinogen frequency Never used    Comment: Never used on 11/15/2018     Ecstasy frequency Never used    Comment: Never used on 11/15/2018     Other drug frequency Never used    Comment: Never used on 11/15/2018     Opiate frequency Denies use in past 12 months    Last reviewed by Mary Chandra RN on 11/15/2018        I have assessed this patient for substance use within the past 12 months    Family Psychiatric History:   denies    Social History:  Lives alone  Self-employed as   Parents as supportive    Denies history of abuse    Past Medical History:   Diagnosis Date    Anxiety     Depression            Meds/Allergies   all current active meds have been reviewed  No Known Allergies    Objective   Vital signs in last 24 hours:  Temp:  [98 °F (36 7 °C)-99 °F (37 2 °C)] 98 °F (36 7 °C)  HR:  [] 68  Resp:  [18] 18  BP: (114-125)/(56-84) 115/56    No intake or output data in the 24 hours ending 11/15/18 1342    Mental Status Evaluation:  Appearance:  casually dressed   Behavior:  guarded   Speech:  soft   Mood:  depressed   Affect:  blunted   Language: naming objects   Thought Process:  circumstantial   Thought Content:  obsessions   Perceptual Disturbances: None   Risk Potential: Suicidal Ideations without plan, Homicidal Ideations none and Potential for Aggression No   Sensorium:  person and place   Cognition:  grossly intact   Consciousness:  awake    Attention: attention span appeared shorter than expected for age   Intellect: normal   Fund of Knowledge: awareness of current events: fair   Insight:  limited   Judgment: limited Muscle Strength and Tone: arm(s): bilateral   Gait/Station: Parkinsonian gait   Motor Activity: abnormal movement noted  Muscle twitching noted in bilateral upper extremity with parkinsonian gait  Memory: Short and long term memory  fair       Laboratory results:    I have personally reviewed all pertinent laboratory/tests results    Labs in last 72 hours:   Recent Labs      11/15/18   0038   WBC  10 97*   RBC  5 21   HGB  15 7   HCT  46 0   PLT  229   RDW  12 1   NEUTROABS  7 10   SODIUM  139   K  4 1   CL  104   CO2  26   BUN  30*   CREATININE  1 30   GLUC  105   CALCIUM  9 1   AST  20   ALT  51   ALKPHOS  70   TP  6 7   ALB  3 8   TBILI  0 30   [id number]  3 051     Admission Labs:   Admission on 11/15/2018, Discharged on 11/15/2018   Component Date Value    WBC 11/15/2018 10 97*    RBC 11/15/2018 5 21     Hemoglobin 11/15/2018 15 7     Hematocrit 11/15/2018 46 0     MCV 11/15/2018 88     MCH 11/15/2018 30 1     MCHC 11/15/2018 34 1     RDW 11/15/2018 12 1     MPV 11/15/2018 8 5*    Platelets 83/42/6969 229     nRBC 11/15/2018 0     Neutrophils Relative 11/15/2018 64     Immat GRANS % 11/15/2018 1     Lymphocytes Relative 11/15/2018 23     Monocytes Relative 11/15/2018 10     Eosinophils Relative 11/15/2018 2     Basophils Relative 11/15/2018 0     Neutrophils Absolute 11/15/2018 7 10     Immature Grans Absolute 11/15/2018 0 08     Lymphocytes Absolute 11/15/2018 2 49     Monocytes Absolute 11/15/2018 1 07     Eosinophils Absolute 11/15/2018 0 19     Basophils Absolute 11/15/2018 0 04     Sodium 11/15/2018 139     Potassium 11/15/2018 4 1     Chloride 11/15/2018 104     CO2 11/15/2018 26     ANION GAP 11/15/2018 9     BUN 11/15/2018 30*    Creatinine 11/15/2018 1 30     Glucose 11/15/2018 105     Calcium 11/15/2018 9 1     AST 11/15/2018 20     ALT 11/15/2018 51     Alkaline Phosphatase 11/15/2018 70     Total Protein 11/15/2018 6 7     Albumin 11/15/2018 3 8     Total Bilirubin 11/15/2018 0 30     eGFR 11/15/2018 62     TSH 3RD GENERATON 11/15/2018 3 051     Amph/Meth UR 11/15/2018 Negative     Barbiturate Ur 11/15/2018 Negative     Benzodiazepine Urine 11/15/2018 Positive*    Cocaine Urine 11/15/2018 Negative     Methadone Urine 11/15/2018 Negative     Opiate Urine 11/15/2018 Negative     PCP Ur 11/15/2018 Negative     THC Urine 11/15/2018 Negative     Ethanol Lvl 20/85/2131 <3     Salicylate Lvl 01/36/8299 <3*    Acetaminophen Level 11/15/2018 4 0*    Carbon Monoxide, Blood 11/15/2018 2 5*         Risks / Benefits of Treatment:     Risks, benefits, and possible side effects of medications explained to patient  The patient verbalizes understanding and agreement for treatment  Counseling / Coordination of Care:     Patient's presentation on admission and proposed treatment plan discussed with treatment team   Diagnosis, medication changes and treatment plan reviewed with patient  Recent stressors discussed with patient     Events leading to admission reviewed with patient  Importance of medication and treatment compliance reviewed with patient  Inpatient Psychiatric Certification:     Certification: Based upon physical, mental and social evaluations, I certify that inpatient psychiatric services are medically necessary for this patient for a duration of 7 midnights for the treatment of Severe episode of recurrent major depressive disorder, without psychotic features ([facility] )    Available alternative community resources do not meet the patient's mental health care needs  I further attest that an established written individualized plan of care has been implemented and is outlined in the patient's medical records  This note has been constructed using a voice recognition system  There may be translation, syntax,  or grammatical errors  If you have any questions, please contact the dictating provider

## 2018-11-16 LAB — GAS + CO PNL BLDA: 2.2 % (ref 0–1.5)

## 2018-11-16 PROCEDURE — 82375 ASSAY CARBOXYHB QUANT: CPT | Performed by: PSYCHIATRY & NEUROLOGY

## 2018-11-16 PROCEDURE — 99232 SBSQ HOSP IP/OBS MODERATE 35: CPT | Performed by: PSYCHIATRY & NEUROLOGY

## 2018-11-16 RX ORDER — ZOLPIDEM TARTRATE 5 MG/1
5 TABLET ORAL
Status: DISCONTINUED | OUTPATIENT
Start: 2018-11-16 | End: 2018-11-17

## 2018-11-16 RX ORDER — ESCITALOPRAM OXALATE 5 MG/1
5 TABLET ORAL ONCE
Status: COMPLETED | OUTPATIENT
Start: 2018-11-16 | End: 2018-11-16

## 2018-11-16 RX ORDER — ESCITALOPRAM OXALATE 10 MG/1
10 TABLET ORAL DAILY
Status: DISCONTINUED | OUTPATIENT
Start: 2018-11-17 | End: 2018-11-20 | Stop reason: HOSPADM

## 2018-11-16 RX ORDER — CLONAZEPAM 1 MG/1
1 TABLET ORAL 2 TIMES DAILY
Status: DISCONTINUED | OUTPATIENT
Start: 2018-11-16 | End: 2018-11-20 | Stop reason: HOSPADM

## 2018-11-16 RX ORDER — ZOLPIDEM TARTRATE 5 MG/1
5 TABLET ORAL
Status: DISCONTINUED | OUTPATIENT
Start: 2018-11-16 | End: 2018-11-20 | Stop reason: HOSPADM

## 2018-11-16 RX ADMIN — ESCITALOPRAM OXALATE 5 MG: 5 TABLET, FILM COATED ORAL at 09:58

## 2018-11-16 RX ADMIN — ZOLPIDEM TARTRATE 5 MG: 5 TABLET, FILM COATED ORAL at 21:40

## 2018-11-16 RX ADMIN — CLONAZEPAM 1 MG: 1 TABLET ORAL at 09:59

## 2018-11-16 RX ADMIN — CLONAZEPAM 1 MG: 1 TABLET ORAL at 17:46

## 2018-11-16 RX ADMIN — ESCITALOPRAM OXALATE 5 MG: 5 TABLET, FILM COATED ORAL at 09:59

## 2018-11-16 RX ADMIN — LORAZEPAM 1 MG: 1 TABLET ORAL at 01:59

## 2018-11-16 NOTE — PROGRESS NOTES
Seclusive to room  Scant in conversation  Denies SI/HI  Reports depression and elevated anxiety  Received education on lexapro and discussed unit rules  No questions or concerns

## 2018-11-16 NOTE — PROGRESS NOTES
Reports feeling anxious and depressed, denies SI at current time  Pt agreeable to starting medications  Scant conversation, flat affect, follows basic prompts  Disheveled, eats meals, denies attending groups

## 2018-11-16 NOTE — PROGRESS NOTES
Pt having difficulty sleeping earlier in the shift  States feeling elevated anxiety and does appear to be anxious  Remains cooperative in conversation  Received PRN for anxiety upon request which does appear to be helpful  Pt able to fall asleep within the hour of receiving medications and appears to have slept remainder of the night without difficulty

## 2018-11-16 NOTE — PROGRESS NOTES
Progress Note - Behavioral Health   Joelle Quezada 46 y o  male MRN: [id number]  Unit/Bed#: [facility] 258-01 Encounter: [id number]    Assessment/Plan   Principal Problem:    Severe episode of recurrent major depressive disorder, without psychotic features ([address] )  Active Problems:    Generalized anxiety disorder    Subjective:  Patient is compliant with medication with no acute side effects  Patient did remained cooperative during evaluation with evident constricted affect  Patient reports no changes in depression or anxiety  Patient received Ativan x2 yesterday for anxiety management and remained with poor sleep last night  Patient has agreed with plan of increasing Lexapro to 10 mg today and adding clonazepam for benefit with anxiety and abnormal movements management  AIMS exam revealed twitching in bilateral forearm and shakes more on left hand noted  Patient reports slow improvement in shakes today but twitching remained same in bilateral forearms  Again discussed that this may be likely from [address] and patient is no longer on Geodon at this time  No manic or psychotic symptoms noted  Patient is consenting for safety on the unit      Current Medications:    Current Facility-Administered Medications:  acetaminophen 650 mg Oral Q6H PRN Aileen Mae MD   aluminum-magnesium hydroxide-simethicone 30 mL Oral Q4H PRN Aileen Mae MD   benztropine 1 mg Intramuscular Q6H PRN Aileen Mae MD   benztropine 1 mg Oral Q6H PRN Aileen Mae MD   clonazePAM 1 mg Oral BID Chema Sol   [START ON 11/17/2018] escitalopram 10 mg Oral Daily Chema Sol   hydrOXYzine HCL 25 mg Oral Q4H PRN Aileen Mae MD   influenza vaccine 0 5 mL Intramuscular Prior to discharge Chema Sol   LORazepam 2 mg Intramuscular Q4H PRN Aileen Mae MD   LORazepam 1 mg Oral Q4H PRN Aileen Mae MD   magnesium hydroxide 30 mL Oral Daily PRN Aileen Mae MD   OLANZapine 5 mg Oral Q3H PRN Aileen Mae MD   traZODone 50 mg Oral HS PRN Trish Roe MD   zolpidem 5 mg Oral HS Chema Sol   zolpidem 5 mg Oral HS PRN [address] [facility] Medications: all current active meds have been reviewed  Vital signs in last 24 hours:  Temp:  [98 8 °F (37 1 °C)-100 3 °F (37 9 °C)] 99 °F (37 2 °C)  HR:  [] 93  Resp:  [18-20] 18  BP: (113-128)/(63-78) 115/74    Laboratory results:    I have personally reviewed all pertinent laboratory/tests results    Labs in last 72 hours:   Recent Labs      11/15/18   0038   WBC  10 97*   RBC  5 21   HGB  15 7   HCT  46 0   PLT  229   RDW  12 1   NEUTROABS  7 10   SODIUM  139   K  4 1   CL  104   CO2  26   BUN  30*   CREATININE  1 30   GLUC  105   CALCIUM  9 1   AST  20   ALT  51   ALKPHOS  70   TP  6 7   ALB  3 8   TBILI  0 30   [id number]  3 051     Admission Labs:   Admission on 11/15/2018   Component Date Value    Carbon Monoxide, Blood 11/16/2018 2 2*       Psychiatric Review of Systems:  Behavior over the last 24 hours:  unchanged  Sleep: insomnia  Appetite: normal  Medication side effects: No  ROS: no complaints    Mental Status Evaluation:  Appearance:  casually dressed   Behavior:  guarded   Speech:  soft   Mood:  anxious and depressed   Affect:  constricted   Language naming objects   Thought Process:  circumstantial   Thought Content:  obsessions   Perceptual Disturbances: None   Risk Potential: Suicidal Ideations without plan, Homicidal Ideations none and Potential for Aggression No   Sensorium:  person and place   Cognition:  grossly intact   Consciousness:  awake    Attention: attention span appeared shorter than expected for age   Insight:  limited   Judgment: limited   Intellect    Gait/Station: normal gait/station   Motor Activity: AIMS done (twitching in bilateral forearm and shakes more on left hand noted)     Memory: Short and long term memory  fair     Progress Toward Goals:  Not progressing    Recommended Treatment:   Increase Lexapro to 10 mg daily for depression and anxiety management  Add Klonopin 1 mg b i d  For anxiety management and abnormal movement management  Add Ambien 5 mg at HS for insomnia management  Continue with group therapy, milieu therapy and occupational therapy  Continue following current medications:   Current Facility-Administered Medications:  acetaminophen 650 mg Oral Q6H PRN Aileen Mae MD   aluminum-magnesium hydroxide-simethicone 30 mL Oral Q4H PRN Aileen Mae MD   benztropine 1 mg Intramuscular Q6H PRN Aileen Mae MD   benztropine 1 mg Oral Q6H PRN Aielen Mae MD   clonazePAM 1 mg Oral BID Chema Sol   [START ON 11/17/2018] escitalopram 10 mg Oral Daily Chema Sol   hydrOXYzine HCL 25 mg Oral Q4H PRN Aileen Mae MD   influenza vaccine 0 5 mL Intramuscular Prior to discharge Chema Sol   LORazepam 2 mg Intramuscular Q4H PRN Aileen Mae MD   LORazepam 1 mg Oral Q4H PRN Aileen Mae MD   magnesium hydroxide 30 mL Oral Daily PRN Aileen Mae MD   OLANZapine 5 mg Oral Q3H PRN Aileen Mae MD   traZODone 50 mg Oral HS PRN Aileen Mae MD   zolpidem 5 mg Oral HS Chema Sol   zolpidem 5 mg Oral HS PRN Chema Sol       Risks, benefits and possible side effects of Medications:   Risks, benefits, and possible side effects of medications explained to patient and patient verbalizes understanding  This note has been constructed using a voice recognition system  There may be translation, syntax,  or grammatical errors  If you have any questions, please contact the dictating provider

## 2018-11-16 NOTE — CASE MANAGEMENT
CW met with Pt who was lying in bed & when CW inquired how he was, he said, "great"  CW reviewed that his aunt had left a message regarding transfer to [facility] said that he was told they were working on that, but he wasn't sure that was a good idea  CW inquired if Pt was agreeable with remaining at [facility] ([facility]) & he said yes, but that CW could inquired about [facility] as well  CW check the status of Pt's insurance, & it was documented that it had termed & a referral was made to [address] returned a phone call to Pt's  Chris Rosen @ [phone] to review that Pt did not currently have active insurance  She said that she keeps Pt's books, & was certain she had paid the premium to [employer] last month  CW reviewed the insurance information on record & she said that [address] [place] [address] Sarah would need to follow-up with Pt & ask if he switched insurances since 1/1/18  CW reviewed that with no active insurance Pt could not be transferred & he was agreeable with remaining in treatment at [facility] ([facility])  Linsey Diane said that they were going to try to get Pt's parents down to visit on Saturday from 2-4 PM & possibly on Sun as well  She reported that she was told it could have been one of the medications Dr Aure Goodman was prescribing Pt that was making him suicidal   [address] [place] [address] reviewed current medications, & how Pt did overnight  Linsey Diane said that they were hopeful for discharge on Monday, & CW reviewed it was tentative for Tuesday, however, that could change depending on how Pt did over the weekend  CW said that she would have Pt's primary CW follow-up with Linsey Diane on Monday to see how the visit went & if family had any concerns  CW followed back up with Pt who said he did think he changed his insurance since 1/1/18  Pt said he had his card in his pocket, however, it wasn't there  CW checked Pt's locker & the [address] insurance card was there    Pt said that he thought he had a [employer], however, he didn't know where his card may be or any other information    CW contacted [facility] to make them aware that Pt thought he had insurance through [place]

## 2018-11-16 NOTE — PLAN OF CARE
Problem: DISCHARGE PLANNING  Goal: Discharge to home or other facility with appropriate resources  INTERVENTIONS:  - Identify barriers to discharge w/patient and caregiver  - Arrange for needed discharge resources and transportation as appropriate  - Identify discharge learning needs (meds, wound care, etc )  - Arrange for interpretive services to assist at discharge as needed  - Refer to Case Management Department for coordinating discharge planning if the patient needs post-hospital services based on physician/advanced practitioner order or complex needs related to functional status, cognitive ability, or social support system   Outcome: Progressing  Pt thinks he has insurance through [employer], however, does not have this card with him  His cousin, Niurka Wagner, also reported she thought she had paid his insurance premium last month, but neither able to provide a card or phone number

## 2018-11-17 PROCEDURE — 99232 SBSQ HOSP IP/OBS MODERATE 35: CPT | Performed by: PSYCHIATRY & NEUROLOGY

## 2018-11-17 RX ORDER — TEMAZEPAM 15 MG/1
15 CAPSULE ORAL
Status: DISCONTINUED | OUTPATIENT
Start: 2018-11-17 | End: 2018-11-18

## 2018-11-17 RX ADMIN — TEMAZEPAM 15 MG: 15 CAPSULE ORAL at 21:32

## 2018-11-17 RX ADMIN — CLONAZEPAM 1 MG: 1 TABLET ORAL at 09:16

## 2018-11-17 RX ADMIN — ESCITALOPRAM OXALATE 10 MG: 10 TABLET ORAL at 09:16

## 2018-11-17 RX ADMIN — ZOLPIDEM TARTRATE 5 MG: 5 TABLET, FILM COATED ORAL at 01:22

## 2018-11-17 RX ADMIN — CLONAZEPAM 1 MG: 1 TABLET ORAL at 17:15

## 2018-11-17 NOTE — PROGRESS NOTES
Was given repeat ambien 5 mg po prn/sleep at [address]  Good effect obtained, as observed asleep in bed within the hr & remains asleep presently

## 2018-11-17 NOTE — PROGRESS NOTES
Pt is seclusive to room  Pleasant but scant in conversation  Pt appears depressed, soft speech  Pt is able to CFS  Pt is attending meals and groups but otherwise seclusive to self

## 2018-11-17 NOTE — PROGRESS NOTES
Progress Note - Behavioral Health   Jalil Goss 46 y o  male MRN: [id number]  Unit/Bed#: [facility] 258-01 Encounter: [id number]    Assessment/Plan   Principal Problem:    Severe episode of recurrent major depressive disorder, without psychotic features ([address] )  Active Problems:    Generalized anxiety disorder    Subjective:   Patient is compliant with medication  Patient continues to remained depressed and anxious but reports not endorsing suicidal or homicidal ideation  During AIMS exam patient his having persistence of bilateral forearm twitching of muscles and bilateral hand tremors worse is dystonia  Patient reports no improvement from addition of clonazepam   Patient remained with insomnia despite taking Ambien 5 mg x2  Patient agreed with plan of trying temazepam 15 mg at HS tonight for insomnia  Most part of the session was dedicated to educating patient on tardive dyskinesia  Patient understand the reason for worsening abnormal movement today after Geodon was discontinued  Patient is consenting for safety on the unit and is focused on discharge at this time      Current Medications:    Current Facility-Administered Medications:  acetaminophen 650 mg Oral Q6H PRN Adelina Paul MD   aluminum-magnesium hydroxide-simethicone 30 mL Oral Q4H PRN Adelina Paul MD   benztropine 1 mg Intramuscular Q6H PRN Adelina Paul MD   benztropine 1 mg Oral Q6H PRN Adelina Paul MD   clonazePAM 1 mg Oral BID Chema Sol   escitalopram 10 mg Oral Daily Chema Sol   hydrOXYzine HCL 25 mg Oral Q4H PRN Adelina Paul MD   influenza vaccine 0 5 mL Intramuscular Prior to discharge Chema Sol   LORazepam 2 mg Intramuscular Q4H PRN Adelina Paul MD   LORazepam 1 mg Oral Q4H PRN Adelina Paul MD   magnesium hydroxide 30 mL Oral Daily PRN Adelina Paul MD   temazepam 15 mg Oral HS Chema Sol   traZODone 50 mg Oral HS PRN Adelina Paul MD   zolpidem 5 mg Oral HS PRN [address] [facility] [address] Medications: all current active meds have been reviewed  Vital signs in last 24 hours:  Temp:  [98 7 °F (37 1 °C)] 98 7 °F (37 1 °C)  HR:  [] 94  Resp:  [15-16] 16  BP: (125-132)/(80-81) 125/81    Laboratory results:    I have personally reviewed all pertinent laboratory/tests results  Labs in last 72 hours:   Recent Labs      11/15/18   0038   WBC  10 97*   RBC  5 21   HGB  15 7   HCT  46 0   PLT  229   RDW  12 1   NEUTROABS  7 10   SODIUM  139   K  4 1   CL  104   CO2  26   BUN  30*   CREATININE  1 30   GLUC  105   CALCIUM  9 1   AST  20   ALT  51   ALKPHOS  70   TP  6 7   ALB  3 8   TBILI  0 30   [id number]  3 051     Admission Labs:   Admission on 11/15/2018   Component Date Value    Carbon Monoxide, Blood 11/16/2018 2 2*       Psychiatric Review of Systems:  Behavior over the last 24 hours:  unchanged  Sleep: insomnia  Appetite: normal  Medication side effects: No  ROS: no complaints    Mental Status Evaluation:  Appearance:  casually dressed   Behavior:  guarded   Speech:  soft   Mood:  anxious and depressed   Affect:  constricted   Language repeating phrases   Thought Process:  circumstantial   Thought Content:  obsessions   Perceptual Disturbances: None   Risk Potential: Suicidal Ideations none, Homicidal Ideations none and Potential for Aggression No   Sensorium:  person and place   Cognition:  grossly intact   Consciousness:  awake    Attention: attention span appeared shorter than expected for age   Insight:  limited   Judgment: limited   Intellect fair   Gait/Station: normal gait/station   Motor Activity: abnormal movement noted  AIMS done     Memory: Short and long term memory  fair     Progress Toward Goals: slow progress    Recommended Treatment:   Switch Ambien to temazepam 15 mg at HS for insomnia management  Continue with group therapy, milieu therapy and occupational therapy      Continue following current medications:   Current Facility-Administered Medications:  acetaminophen 650 mg Oral Q6H PRN Harvey Chávez MD   aluminum-magnesium hydroxide-simethicone 30 mL Oral Q4H PRN Harvey Chávez MD   benztropine 1 mg Intramuscular Q6H PRN Harvey Chávez MD   benztropine 1 mg Oral Q6H PRN Harvey Chávez MD   clonazePAM 1 mg Oral BID [facility]   escitalopram 10 mg Oral Daily [facility]   hydrOXYzine HCL 25 mg Oral Q4H PRN Harvey Chávez MD   influenza vaccine 0 5 mL Intramuscular Prior to discharge [facility]   LORazepam 2 mg Intramuscular Q4H PRN Harvey Chávez MD   LORazepam 1 mg Oral Q4H PRN Harvey Chávez MD   magnesium hydroxide 30 mL Oral Daily PRN Harvey Chávez MD   temazepam 15 mg Oral HS [facility]   traZODone 50 mg Oral HS PRN Harvey Chávez MD   zolpidem 5 mg Oral HS PRN Chematam Sol       Risks, benefits and possible side effects of Medications:   Risks, benefits, and possible side effects of medications explained to patient and patient verbalizes understanding  This note has been constructed using a voice recognition system  There may be translation, syntax,  or grammatical errors  If you have any questions, please contact the dictating provider

## 2018-11-17 NOTE — PROGRESS NOTES
Pt reports depression is slightly better today, denies SI/HI  Reports feeling anxious constantly but that level of anxiety varies, rates anxiety 5/10 at start of shift  Pt is anxious about multiple stressors, did not go into detail  Pt reports not sleeping well and that he only slept one hour last night, usually sleeps 4-5 hours at home  Pt is concerned about his insurance, states his insurance only covers $100 per day for six days  Pt has tremor in hands, reports it began when he started taking Geodon  Seclusive to room throughout shift, scant in conversation, blunted affect

## 2018-11-18 LAB — GAS + CO PNL BLDA: 2.5 % (ref 0–1.5)

## 2018-11-18 PROCEDURE — 99232 SBSQ HOSP IP/OBS MODERATE 35: CPT | Performed by: PSYCHIATRY & NEUROLOGY

## 2018-11-18 PROCEDURE — 82375 ASSAY CARBOXYHB QUANT: CPT | Performed by: PSYCHIATRY & NEUROLOGY

## 2018-11-18 RX ORDER — TEMAZEPAM 15 MG/1
30 CAPSULE ORAL
Status: DISCONTINUED | OUTPATIENT
Start: 2018-11-18 | End: 2018-11-20 | Stop reason: HOSPADM

## 2018-11-18 RX ADMIN — CLONAZEPAM 1 MG: 1 TABLET ORAL at 09:28

## 2018-11-18 RX ADMIN — TEMAZEPAM 30 MG: 15 CAPSULE ORAL at 21:40

## 2018-11-18 RX ADMIN — TRAZODONE HYDROCHLORIDE 50 MG: 50 TABLET ORAL at 00:50

## 2018-11-18 RX ADMIN — CLONAZEPAM 1 MG: 1 TABLET ORAL at 17:04

## 2018-11-18 RX ADMIN — ESCITALOPRAM OXALATE 10 MG: 10 TABLET ORAL at 09:28

## 2018-11-18 NOTE — PROGRESS NOTES
Pt   Given  Desyrel  50 mg po  For  Sleep at 0050  Pt states  Restoril  15 mg po ineffective  Desyrel  Effective  Pt  Lucas Landry

## 2018-11-18 NOTE — PROGRESS NOTES
Pt reports sleeping poorly last night, states he dozed off for a bit after receiving PRN Trazodone but then woke up again  Pt reports improved depression and denies SI/HI, reports continued anxiety  Pt identifies watching sports as a coping skill and is hopeful he can watch football tonight  Encouraged pt into milieu, pt reports he did go to one group today so far  Scant and quiet in conversation, somewhat guarded, flat affect, poor eye contact

## 2018-11-18 NOTE — PROGRESS NOTES
Patient had visit with family  Would give details about it but said it went well  Scant and guarded during conversation  Denies SI and depression but presents with a depressed, blunted affect  Says he wants to go home and is anxious for discharge  Patient laying in bed most of evening after dinner

## 2018-11-18 NOTE — PROGRESS NOTES
Spoke with pt in quiet room  Pt flat [facility]  Offered hope and reassurance  Pt said he had a downturn of depression and anxiety after his divorce  RN expressed sorrow  Felipa Morton he is worried about losing house and that he does not have health insurance to cover this admission  Encouraged that when he feels better, he'll be better able to cope  Pt remained flat  Encouraged that medication will help  Pt denied SI, HI and AVH  Continue to monitor

## 2018-11-18 NOTE — PROGRESS NOTES
Progress Note - Behavioral Health   Amaury Cooley 46 y o  male MRN: [id number]  Unit/Bed#: [facility] 258-01 Encounter: [id number]    Assessment/Plan   Principal Problem:    Severe episode of recurrent major depressive disorder, without psychotic features ([address] )  Active Problems:    Generalized anxiety disorder    Subjective:   Patient is compliant with medications with no acute side effects  Patient is shakes are improving and upper extremity twitching movement has reduced in intensity  Patient verbalized that he was using close to 30 mg of Valium before admission for anxiety management  Discussed that his shakes are likely from benzodiazepine withdrawal and clonazepam will help him with withdrawal management  Patient has responded poorly to trazodone and Ambien  Patient reports slow improvement in sleep with temazepam but remained with insomnia despite getting trazodone p r n  last night  Patient agreed with plan of increasing temazepam dose tonight  Patient understand that him as a Thuy is for short duration till his sleep gets better  Patient remained with constricted affect and does report feeling depressed and anxious but continues to deny endorsing suicidal or homicidal ideation  Patient has remained focused on discharge at this time  Patient continues to verbalize that he do not belong in this place       Current Medications:    Current Facility-Administered Medications:  acetaminophen 650 mg Oral Q6H PRN Franklin Fernandez MD   aluminum-magnesium hydroxide-simethicone 30 mL Oral Q4H PRN Franklin Fernandez MD   benztropine 1 mg Intramuscular Q6H PRN Franklin Fernandez MD   benztropine 1 mg Oral Q6H PRN Franklin Fernandez MD   clonazePAM 1 mg Oral BID Chema Sol   escitalopram 10 mg Oral Daily Chema Sol   hydrOXYzine HCL 25 mg Oral Q4H PRN Franklin Fernandez MD   influenza vaccine 0 5 mL Intramuscular Prior to discharge Chema Sol   LORazepam 2 mg Intramuscular Q4H PRN Franklin Fernandez MD   LORazepam 1 mg Oral Q4H PRN Doug Kaye MD   magnesium hydroxide 30 mL Oral Daily PRN Doug Kaye MD   temazepam 30 mg Oral HS Chema Sol   traZODone 50 mg Oral HS PRN Doug Kaye MD   zolpidem 5 mg Oral HS PRN [address] [facility] [address] Medications: all current active meds have been reviewed  Vital signs in last 24 hours:  Temp:  [98 9 °F (37 2 °C)-99 1 °F (37 3 °C)] 98 9 °F (37 2 °C)  HR:  [] 89  Resp:  [16-18] 16  BP: (121-133)/(75-82) 133/82    Laboratory results:    I have personally reviewed all pertinent laboratory/tests results  Labs in last 72 hours: No results for input(s): WBC, RBC, HGB, HCT, PLT, RDW, NEUTROABS, SODIUM, K, CL, CO2, BUN, CREATININE, GLUC, GLUF, CALCIUM, AST, ALT, ALKPHOS, TP, ALB, TBILI, BILIDIR, CHOLESTEROL, HDL, TRIG, LDLCALC, VALPROICTOT, CARBAMAZEPIN, LITHIUM, AMMONIA, [id number], FREET4, T3FREE, PREGTESTUR, PREGSERUM, HCG, HCGQUANT, RPR in the last 72 hours      Invalid input(s):  RBC  Admission Labs:   Admission on 11/15/2018   Component Date Value    Carbon Monoxide, Blood 11/16/2018 2 2*       Psychiatric Review of Systems:  Behavior over the last 24 hours:  Slow improvement  Sleep: insomnia (temazepam 15 mg and trazodone 50 mg not effective last night)  Appetite: normal  Medication side effects: No  ROS: no complaints    Mental Status Evaluation:  Appearance:  casually dressed   Behavior:  guarded   Speech:  soft   Mood:  anxious and depressed   Affect:  constricted   Language naming objects and repeating phrases   Thought Process:  circumstantial   Thought Content:  obsessions   Perceptual Disturbances: None   Risk Potential: Suicidal Ideations none, Homicidal Ideations none and Potential for Aggression No   Sensorium:  person and place   Cognition:  grossly intact   Consciousness:  awake    Attention: attention span and concentration were age appropriate   Insight:  fair   Judgment: fair   Intellect fair   Gait/Station: normal gait/station   Motor Activity: shakes Memory: Short and long term memory  fair     Progress Toward Goals:   Slow progress    Recommended Treatment:   Increase temazepam to 30 mg at HS for insomnia management  Repeat carboxyhemoglobin level today  Continue with group therapy, milieu therapy and occupational therapy  Continue following current medications:   Current Facility-Administered Medications:  acetaminophen 650 mg Oral Q6H PRN Monica Alexander MD   aluminum-magnesium hydroxide-simethicone 30 mL Oral Q4H PRN Monica Alexander MD   benztropine 1 mg Intramuscular Q6H PRN Monica Alexander MD   benztropine 1 mg Oral Q6H PRN Monica Alexander MD   clonazePAM 1 mg Oral BID Chema Sol   escitalopram 10 mg Oral Daily Chema Sol   hydrOXYzine HCL 25 mg Oral Q4H PRN Monica Alexander MD   influenza vaccine 0 5 mL Intramuscular Prior to discharge Chema Sol   LORazepam 2 mg Intramuscular Q4H PRN Monica Alexander MD   LORazepam 1 mg Oral Q4H PRN Monica Alexander MD   magnesium hydroxide 30 mL Oral Daily PRN Monica Alexander MD   temazepam 30 mg Oral HS Chema Sol   traZODone 50 mg Oral HS PRN Monica Alexander MD   zolpidem 5 mg Oral HS PRN Chema Sol       Risks, benefits and possible side effects of Medications:   Risks, benefits, and possible side effects of medications explained to patient and patient verbalizes understanding  This note has been constructed using a voice recognition system  There may be translation, syntax,  or grammatical errors  If you have any questions, please contact the dictating provider

## 2018-11-19 PROBLEM — T58.91XA CARBOXYHEMOGLOBINEMIA: Status: ACTIVE | Noted: 2018-11-19

## 2018-11-19 LAB
ARTERIAL PATENCY WRIST A: NORMAL
BASE EXCESS BLDA CALC-SCNC: 2 MMOL/L (ref -2–3)
FIO2 GAS DIL.REBREATH: 21 L
HCO3 BLDA-SCNC: 27.1 MMOL/L (ref 22–28)
PCO2 BLD: 28 MMOL/L (ref 21–32)
PCO2 BLD: 44 MM HG (ref 36–44)
PH BLD: 7.4 [PH] (ref 7.35–7.45)
PO2 BLD: 87 MM HG (ref 75–129)
SAMPLE SITE: NORMAL
SAO2 % BLD FROM PO2: 97 % (ref 95–98)
SPECIMEN SOURCE: NORMAL

## 2018-11-19 PROCEDURE — 90682 RIV4 VACC RECOMBINANT DNA IM: CPT | Performed by: PSYCHIATRY & NEUROLOGY

## 2018-11-19 PROCEDURE — 82803 BLOOD GASES ANY COMBINATION: CPT

## 2018-11-19 PROCEDURE — 99232 SBSQ HOSP IP/OBS MODERATE 35: CPT | Performed by: PSYCHIATRY & NEUROLOGY

## 2018-11-19 PROCEDURE — 36600 WITHDRAWAL OF ARTERIAL BLOOD: CPT

## 2018-11-19 RX ADMIN — CLONAZEPAM 1 MG: 1 TABLET ORAL at 09:00

## 2018-11-19 RX ADMIN — ESCITALOPRAM OXALATE 10 MG: 10 TABLET ORAL at 09:00

## 2018-11-19 RX ADMIN — TRAZODONE HYDROCHLORIDE 50 MG: 50 TABLET ORAL at 02:24

## 2018-11-19 RX ADMIN — TEMAZEPAM 30 MG: 15 CAPSULE ORAL at 22:57

## 2018-11-19 RX ADMIN — INFLUENZA A VIRUS A/MICHIGAN/45/2015 (H1N1) RECOMBINANT HEMAGGLUTININ ANTIGEN, INFLUENZA A VIRUS A/SINGAPORE/INFIMH-16-0019/2016 (H3N2) RECOMBINANT HEMAGGLUTININ ANTIGEN, INFLUENZA B VIRUS B/MARYLAND/15/2016 RECOMBINANT HEMAGGLUTININ ANTIGEN, AND INFLUENZA B VIRUS B/PHUKET/3073/2013 RECOMBINANT HEMAGGLUTININ ANTIGEN 0.5 ML: 45; 45; 45; 45 INJECTION INTRAMUSCULAR at 13:14

## 2018-11-19 RX ADMIN — CLONAZEPAM 1 MG: 1 TABLET ORAL at 17:10

## 2018-11-19 NOTE — DISCHARGE INSTR - OTHER ORDERS
You were provided with the following information, services, and supports throughout [facility]:     Discount prescription cards were added in your discharge folder  The PEER LINE is a toll-free telephone number for people in [facility] who are seeking a listening ear for additional support in their recovery from mental illness  The PEER LINE is peer-run and peer-friendly  You can call the Peer Line 24 hours a day  (T)  6-514-ZX-PEERS (T) S9946297  Emergency Services: [facility] located @ [address] [place] [address] [phone]  Crisis Text Line is free, 24/7 support for those in crisis  Text HOME 218309 from anywhere in the [place] to text with a trained Crisis Counselor  Warm Line: (T) [phone], (T) 209- [phone], (T) [phone]  If it is not quite a crisis, but you want to talk to someone, 24 hours/day, 7 days/week: Someone to listen; someone who cares

## 2018-11-19 NOTE — PROGRESS NOTES
Spoke to respiratory, relayed that pt refusing ABG  Respiratory expressed understanding that no further action needed at this time

## 2018-11-19 NOTE — PROGRESS NOTES
Gave PRN Trazodone 50 mg for sleep  Temazepam 30mg ineffective for sleep  Sat and Sun night pt had to follow Temazepam up with Trazodone to fall and stay asleep  Sleeping now

## 2018-11-19 NOTE — CASE MANAGEMENT
CM outreached to PT's PCP- Jacki Soto located @ [address] [place], [place] [id number] R) [phone] (m) [phone] and provided D/C notification  PT is scheduled for a follow up medical appointment (earlierst one available) with Wednesday 11/28/18 @ 10:30am with KASSIE Moses

## 2018-11-19 NOTE — PROGRESS NOTES
Progress Note - Behavioral Health   Ok Scheuermann 46 y o  male MRN: [id number]  Unit/Bed#: [facility] 258-01 Encounter: [id number]    Assessment/Plan   Principal Problem:    Severe episode of recurrent major depressive disorder, without psychotic features ([address] )  Active Problems:    Generalized anxiety disorder    Subjective:   Patient is compliant with medications with no acute side effects  Patient is showing slow improvement in mood and anxiety  Patient reports no suicidal ideation today but remained with constricted affect  We discussed his constricted affect in detail and patient reports that this is a mix of his personality and underlying depression  On examination marked improvement in shakes noted and no muscle twitching noted in forearm  No other abnormal movement noted  Patient remained with poor sleep despite temazepam 30 mg and trazodone 50 mg p r n  Deidramagnus Carbajal Patient does report last night sleep was little better than last few nights  Patient educated on importance of following up with outpatient psychiatrist on discharge and slowly discontinuing the sleep medication  Patient is receptive to the discussion  We also reviewed his recent increase in carboxyhemoglobin level since admission  No physical symptoms noted  Patient agreed with getting medicine consult for evaluation and management recommendation      Current Medications:    Current Facility-Administered Medications:  acetaminophen 650 mg Oral Q6H PRN Robi Ortiz MD   aluminum-magnesium hydroxide-simethicone 30 mL Oral Q4H PRN Robi Ortiz MD   benztropine 1 mg Intramuscular Q6H PRN Robi Ortiz MD   benztropine 1 mg Oral Q6H PRN Robi Ortiz MD   clonazePAM 1 mg Oral BID Chema Sol   escitalopram 10 mg Oral Daily Chema Sol   hydrOXYzine HCL 25 mg Oral Q4H PRN Robi Ortiz MD   influenza vaccine 0 5 mL Intramuscular Prior to discharge Chema Sol   LORazepam 2 mg Intramuscular Q4H PRN Robi Ortiz MD   LORazepam 1 mg Oral Q4H PRN Aileen Mae MD   magnesium hydroxide 30 mL Oral Daily PRN Aileen Mae MD   temazepam 30 mg Oral HS Chema Sol   traZODone 50 mg Oral HS PRN Aileen Mae MD   zolpidem 5 mg Oral HS PRN [address] [facility] Medications: all current active meds have been reviewed  Vital signs in last 24 hours:  Temp:  [98 °F (36 7 °C)-99 3 °F (37 4 °C)] 98 °F (36 7 °C)  HR:  [] 87  Resp:  [16-18] 18  BP: (110-177)/(64-95) 110/66    Laboratory results:    I have personally reviewed all pertinent laboratory/tests results  Labs in last 72 hours: No results for input(s): WBC, RBC, HGB, HCT, PLT, RDW, NEUTROABS, SODIUM, K, CL, CO2, BUN, CREATININE, GLUC, GLUF, CALCIUM, AST, ALT, ALKPHOS, TP, ALB, TBILI, BILIDIR, CHOLESTEROL, HDL, TRIG, LDLCALC, VALPROICTOT, CARBAMAZEPIN, LITHIUM, AMMONIA, [id number], FREET4, T3FREE, PREGTESTUR, PREGSERUM, HCG, HCGQUANT, RPR in the last 72 hours  Invalid input(s):  RBC  Admission Labs:   Admission on 11/15/2018   Component Date Value    Carbon Monoxide, Blood 11/16/2018 2 2*    Carbon Monoxide, Blood 11/18/2018 2 5*       Psychiatric Review of Systems:  Behavior over the last 24 hours:  Slow improvement  Sleep: insomnia- temazepam plus trazodone effective    Appetite: normal  Medication side effects: No  ROS: no complaints    Mental Status Evaluation:  Appearance:  casually dressed   Behavior:  guarded   Speech:  soft   Mood:  depressed   Affect:  constricted   Language naming objects   Thought Process:  normal   Thought Content:  obsessions   Perceptual Disturbances: None   Risk Potential: Suicidal Ideations none, Homicidal Ideations none and Potential for Aggression No   Sensorium:  person, place and time/date   Cognition:  grossly intact   Consciousness:  awake    Attention: attention span appeared shorter than expected for age   Insight:  limited   Judgment: limited   Intellect fair   Gait/Station: normal gait/station   Motor Activity: no abnormal movements Memory: Short and long term memory  fair     Progress Toward Goals: progressing    Recommended Treatment:   Consult medicine: patient with increase in carboxyhemoglobin level during this admission  consult for assessment and management recommendations  Initiate disposition planning  Continue with group therapy, milieu therapy and occupational therapy  Continue following current medications:   Current Facility-Administered Medications:  acetaminophen 650 mg Oral Q6H PRN Harvey Chávez MD   aluminum-magnesium hydroxide-simethicone 30 mL Oral Q4H PRN Harvey Chávez MD   benztropine 1 mg Intramuscular Q6H PRN Harvey Chávez MD   benztropine 1 mg Oral Q6H PRN Harvey Chávez MD   clonazePAM 1 mg Oral BID Chema Sol   escitalopram 10 mg Oral Daily Chema Sol   hydrOXYzine HCL 25 mg Oral Q4H PRN Harvey Chávez MD   influenza vaccine 0 5 mL Intramuscular Prior to discharge Chema Sol   LORazepam 2 mg Intramuscular Q4H PRN Harvey Chávez MD   LORazepam 1 mg Oral Q4H PRN Harvey Chávez MD   magnesium hydroxide 30 mL Oral Daily PRN Harvey Chávez MD   temazepam 30 mg Oral HS Chema Sol   traZODone 50 mg Oral HS PRN Harvey Chávez MD   zolpidem 5 mg Oral HS PRN Chema Sol       Risks, benefits and possible side effects of Medications:   Risks, benefits, and possible side effects of medications explained to patient and patient verbalizes understanding  This note has been constructed using a voice recognition system  There may be translation, syntax,  or grammatical errors  If you have any questions, please contact the dictating provider

## 2018-11-19 NOTE — CASE MANAGEMENT
CM met w/ PT today  CM reviewed PT's D/C plan for tomorrow, transportation, and aftercare services  PT expresses readiness for D/C and verbalized being in support of D/C planning  PT confirmed that his Uncle plans to pick PT up tomorrow morning for D/C  PT presents as scant, guarded, and superficial in conversation  PT presents as maladorous, along w/ a slow latency of response  At times PT would present w/ a blank stare  CM reviewed w/ charge nurse

## 2018-11-19 NOTE — PROGRESS NOTES
Scant in conversation, lying in bed staring at the ceiling  Resting tremors noted to bilateral upper extremities and patient states he has had them for some time since he was prescribed Geodon  Denies anxiety and depression but appears depressed  Refused shower or shave and says he will complete ADLs when he discharges on Tuesday  Mentioned he has showered only once during admission  Medical consult called, spoke with Dr Kushal Archuleta and obtained order for ABG on room air

## 2018-11-19 NOTE — CASE MANAGEMENT
CM outreached to Psychiatry- [facility] Psychiatry located @ [address] [place] [address] [facility] [place], [address] [place] [address] (L) [phone] (E) [phone] and provided D/C notification   PT is scheduled for follow up psychiatric medication management w/ Dr Amena Joseph on Wednesday 11/21/18 @ 2:45pm

## 2018-11-19 NOTE — CASE MANAGEMENT
Due to [facility] terming, PT will be referred for [facility] funding for O/P [address]      PT signed SERENA for:     [facility] [place] located [email or web address] [address] (F) [phone] (G) [phone]    CM outreached to [facility] and [facility] [place] located @ [address] [address] (H) [phone] (A) [phone] and spoke w/ Chip  PT will receive a telephone call within 5-7 business days to schedule an in-take/ assessment for [facility] funding for outpatient psychiatric medication management and therapy

## 2018-11-19 NOTE — CASE MANAGEMENT
CM received a voice-mail from Zay (MICHAEL) [phone] who confirmed being in support of PT's D/C and confirmed that someone from PT's family will pick him up tomorrow Tuesday 11/20/18 @ 10:30am

## 2018-11-19 NOTE — CASE MANAGEMENT
CM received a telephone call from [facility] @ [facility] E) [phone] who communicated that she will call PT tomorrow after D/C to schedule an in-take assessment for [facility]

## 2018-11-20 VITALS
DIASTOLIC BLOOD PRESSURE: 79 MMHG | RESPIRATION RATE: 18 BRPM | HEIGHT: 67 IN | HEART RATE: 92 BPM | WEIGHT: 180.4 LBS | BODY MASS INDEX: 28.31 KG/M2 | SYSTOLIC BLOOD PRESSURE: 121 MMHG | TEMPERATURE: 98.3 F | OXYGEN SATURATION: 94 %

## 2018-11-20 LAB — GAS + CO PNL BLDA: 2.2 % (ref 0–1.5)

## 2018-11-20 PROCEDURE — 99239 HOSP IP/OBS DSCHRG MGMT >30: CPT | Performed by: PSYCHIATRY & NEUROLOGY

## 2018-11-20 PROCEDURE — 82375 ASSAY CARBOXYHB QUANT: CPT | Performed by: PHYSICIAN ASSISTANT

## 2018-11-20 RX ORDER — ESCITALOPRAM OXALATE 10 MG/1
10 TABLET ORAL DAILY
Qty: 14 TABLET | Refills: 0 | Status: SHIPPED | OUTPATIENT
Start: 2018-11-21 | End: 2019-09-30 | Stop reason: ALTCHOICE

## 2018-11-20 RX ORDER — TEMAZEPAM 30 MG/1
30 CAPSULE ORAL
Qty: 14 CAPSULE | Refills: 0 | Status: SHIPPED | OUTPATIENT
Start: 2018-11-20 | End: 2019-09-30 | Stop reason: ALTCHOICE

## 2018-11-20 RX ORDER — CLONAZEPAM 1 MG/1
1 TABLET ORAL 2 TIMES DAILY
Qty: 30 TABLET | Refills: 0 | Status: SHIPPED | OUTPATIENT
Start: 2018-11-20 | End: 2019-09-30 | Stop reason: ALTCHOICE

## 2018-11-20 RX ADMIN — CLONAZEPAM 1 MG: 1 TABLET ORAL at 09:04

## 2018-11-20 RX ADMIN — LORAZEPAM 1 MG: 1 TABLET ORAL at 04:40

## 2018-11-20 RX ADMIN — ESCITALOPRAM OXALATE 10 MG: 10 TABLET ORAL at 09:04

## 2018-11-20 NOTE — CASE MANAGEMENT
PT's D/C plan, transportation, and aftercare services reviewed  PT expresses readiness for D/C and verbalized being in support of D/C today and aftercare services

## 2018-11-20 NOTE — DISCHARGE INSTR - LAB
Contact Information: If you have any questions, concerns, pended studies, tests and/or procedures, or emergencies regarding your inpatient behavioral health visit  Please contact [facility] behavioral health unit [phone] and ask to speak to a , nurse or physician  A contact is available 24 hours/ 7 days a week at this number  Summary of Procedures Performed During your Stay:  Below is a list of major procedures performed during your hospital stay and a summary of results:  - No major procedures performed  Pending Studies     Start     Ordered    11/20/18 0600  Carboxyhemoglobin  Morning draw      11/19/18 2205    11/19/18 1100  Blood gas, arterial  Once      11/19/18 1059        If studies are pending at discharge, follow up with your PCP and/or referring provider

## 2018-11-20 NOTE — DISCHARGE SUMMARY
Discharge Summary - [address] [place] [address] 46 y o  male MRN: [id number]  Unit/Bed#: -01 Encounter: [id number]     Admission Date:   Admission Orders     Ordered        11/15/18 1051  DISCHARGE READMIT Admit Patient to [facility] Unit (use with Discharge Readmit Navigator in [facility] [address] Discharge Readmit scenario including from any IP unit or different campus ED to [facility])  Nurse to release order when pt  arrives to [address] [place] [address] Unit  Once                   Discharge Date: 11/20/18    Attending Psychiatrist: Judy Hoffman MD    Reason for Admission/HPI:   History of Present Illness   Patient is a 79-year-old male who presented to [facility] ED by EMS due to suicidal ideation with plan to overdose and by carbon monoxide poisoning  Patient reported taking 2 tablets of 80mg Geodon, 2 tablets of standard Tylenol PM, 2 tablets of Valium 5mg, and 2 tablets of unknown sleeping pill  Precipitating events stem from divorce in 2015 and since then having poor relationship with his children  Patient is currently a  and called  crisis hotline where police were called  Over the last few years patient reported increased depression with lack of energy, lack of appetite, anhedonia, hypersomnia, psychomotor retardation, hopelessness, guilt, and suicidal thoughts  Additionally, patient reported increased anxiety with racing thoughts  He denied psychosis  He denied history of psychosis  He did not endorse criteria of gamal and denied history of manic symptoms  AIMS exam was performed due to TD movements that began after initiation of Geodon  He was noted to have Parkinsonian gait and blunted affect on admission  Patient denied prior inpatient psychiatric hospitalizations, denied prior suicide attempts, and does have outpatient psychiatrist     Psychosocial Stressors: family      Hospital Course:   Behavioral Health Medications:   current meds:   Current Facility-Administered Medications Medication Dose Route Frequency    acetaminophen (TYLENOL) tablet 650 mg  650 mg Oral Q6H PRN    aluminum-magnesium hydroxide-simethicone (MYLANTA) 200-200-20 mg/5 mL oral suspension 30 mL  30 mL Oral Q4H PRN    benztropine (COGENTIN) injection 1 mg  1 mg Intramuscular Q6H PRN    benztropine (COGENTIN) tablet 1 mg  1 mg Oral Q6H PRN    clonazePAM (KlonoPIN) tablet 1 mg  1 mg Oral BID    escitalopram (LEXAPRO) tablet 10 mg  10 mg Oral Daily    hydrOXYzine HCL (ATARAX) tablet 25 mg  25 mg Oral Q4H PRN    LORazepam (ATIVAN) 2 mg/mL injection 2 mg  2 mg Intramuscular Q4H PRN    LORazepam (ATIVAN) tablet 1 mg  1 mg Oral Q4H PRN    magnesium hydroxide (MILK OF MAGNESIA) 400 mg/5 mL oral suspension 30 mL  30 mL Oral Daily PRN    temazepam (RESTORIL) capsule 30 mg  30 mg Oral HS    traZODone (DESYREL) tablet 50 mg  50 mg Oral HS PRN    zolpidem (AMBIEN) tablet 5 mg  5 mg Oral HS PRN       Patient was admitted to [address] [place] [address] inpatient psychiatric unit on voluntary 201 commitment for safety and stabilization  On admission patient was discontinued from [facility] due to not meeting criteria for bipolar or psychotic disorder  It was replaced with Lexapro 5mg QD for anxiety and depression management  Klonopin 1 mg BID was quickly started as well for anxiety management  He trialed Trazodone, Ambien, and Restoril for insomnia  Ultimately, he was placed on Restoril 30mg HS  Lexapro was titrated to 10mg QD  He tolerated medications with no acute side effects  His mood brightened over the course of his treatment, and he was seen in [facility] interacting appropriately with peers  He did not demonstrate dangerous behavior to self or others during his inpatient stay  On day of discharge patient had reduced depression, controllable anxiety, denied psychosis, did not show signs of gamal, and denied suicidal/homicidal ideations  He verbalized readiness for discharge    He was explained Klonopin and Restoril were short-term treatment options due to habit-forming potential     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  Patient was instructed to check CO monitors at home and follow up with his PCP  He was medically stable at discharge  Mental Status at time of Discharge:     Appearance:  casually dressed   Behavior:  Less guarded   Speech:  soft   Mood:  euthymic   Affect:  Less constricted   Thought Process:  normal   Thought Content:  Denied delusions/obsessions   Perceptual Disturbances: None   Risk Potential: Denied SI/HI  Potential for aggression no   Sensorium:  person, place and time/date   Cognition:  grossly intact   Consciousness:  alert and awake    Attention: attention span appeared shorter than expected for age   Insight:  fair   Judgment: fair   Gait/Station: normal gait/station and normal balance   Motor Activity: no abnormal movements       Discharge Diagnosis:   Severe episode of recurrent major depressive disorder, without psychotic features  Generalized anxiety disorder      Discharge Medications:  See after visit summary for reconciled discharge medications provided to patient and family  Discharge instructions/Information to patient and family:   See after visit summary for information provided to patient and family  Provisions for Follow-Up Care:  See after visit summary for information related to follow-up care and any pertinent home health orders  Discharge Statement   I spent 33 minutes discharging the patient  This time was spent on the day of discharge  I had direct contact with the patient on the day of discharge  On day of discharge patient had mental status exam performed, discharge instructions/medications reviewed, and outpatient planning discussed  He was given 2 weeks of scripts  He denied tobacco cessation therapy      Nathan Sebastian PA-C

## 2018-11-20 NOTE — CONSULTS
[address] Internal Medicine    Consult- Kailey Laurent 1965, 46 y o  male MRN: [id number]    Unit/Bed#: U 258-01 Encounter: [id number]    Primary Care Provider: Camille Bragg   Date and time admitted to hospital: 11/15/2018  8:58 AM      Inpatient consult to Internal Medicine  Consult performed by: Franci Taylor  Consult ordered by: [address], [address] [place] [address] Rd    · Carboxyhemoglobin:  2 5 on 11/15, 2 2 11/16, 2 5 on 11/18  · Repeat in a m  · ABG unremarkable  · Patient denies smoking, contact with other sources of CO  · Patient does not require supplemental oxygen at this time  · Consider outpatient follow-up to identify possible endogenous sources of CO   · Patient should ensure that working carbon monoxide detectors installed in house           VTE Prophylaxis: Reason for no pharmacologic prophylaxis None required  / reason for no mechanical VTE prophylaxis None required     Recommendations for Discharge:  · Discharge once cleared by Psychiatry    Collaboration of Care: Were Recommendations Directly Discussed with Primary Treatment Team? - Yes     History of Present Illness:    Kailey Laurent is a 46 y o  male with a PMH for anxiety, depression who is originally admitted to the psychiatry service due to a severe episode of recurrent major depression without psychotic features  We are consulted for medical management of possible carbon monoxide poisoning  Patient denies contact with carbon monoxide including cigarette smoking, car fumes, open flame  Carboxyhemoglobin levels 2 5 on 11/15, 2 2 11/16, 2 5 on 11/18  Patient is asymptomatic and denies lightheadedness, dizziness, altered mental status, syncope, tremors, weakness, headache, nausea, vomiting, color change  Repeat ABG ordered  Review of Systems:    Review of Systems   Constitutional: Negative for chills, diaphoresis, fatigue and fever  Eyes: Negative for photophobia and visual disturbance  Respiratory: Negative for cough, choking, shortness of breath and wheezing  Cardiovascular: Negative for chest pain, palpitations and leg swelling  Gastrointestinal: Negative for abdominal pain, constipation, diarrhea, nausea and vomiting  Musculoskeletal: Negative for back pain, neck pain and neck stiffness  Skin: Negative for color change, pallor, rash and wound  Neurological: Negative for dizziness, tremors, seizures, syncope, weakness, light-headedness, numbness and headaches  All other systems reviewed and are negative  Past Medical and Surgical History:     Past Medical History:   Diagnosis Date    Anxiety     Depression        No past surgical history on file  Meds/Allergies:    all medications and allergies reviewed    Allergies: No Known Allergies    Social History:     Marital Status:     Substance Use History:   History   Alcohol Use No     History   Smoking Status    Never Smoker   Smokeless Tobacco    Never Used     History   Drug Use No       Family History:    History reviewed  No pertinent family history  Physical Exam:     Vitals:   Blood Pressure: 117/65 (11/19/18 1525)  Pulse: 89 (11/19/18 1525)  Temperature: 99 4 °F (37 4 °C) (11/19/18 1525)  Temp Source: Tympanic (11/19/18 1525)  Respirations: 15 (11/19/18 1525)  Height: 5' 7" (170 2 cm) (11/15/18 1051)  Weight - Scale: 81 8 kg (180 lb 6 4 oz) (11/15/18 1051)  SpO2: 94 % (11/15/18 1051)    Physical Exam   Constitutional: He is oriented to person, place, and time  Vital signs are normal  He appears well-developed and well-nourished  Non-toxic appearance  No distress  HENT:   Head: Normocephalic and atraumatic  Mouth/Throat: Oropharynx is clear and moist and mucous membranes are normal  Normal dentition  No oropharyngeal exudate  Eyes: Pupils are equal, round, and reactive to light  Conjunctivae are normal  Right eye exhibits no discharge  Left eye exhibits no discharge  No scleral icterus     Neck: No JVD present  No tracheal deviation and no erythema present  Cardiovascular: Normal rate, regular rhythm, normal heart sounds, intact distal pulses and normal pulses  Exam reveals no gallop and no friction rub  No murmur heard  Pulmonary/Chest: Effort normal and breath sounds normal  No accessory muscle usage or stridor  No respiratory distress  He has no decreased breath sounds  He has no wheezes  He has no rales  Abdominal: Soft  Bowel sounds are normal  He exhibits no distension and no mass  There is no tenderness  There is no rebound  Musculoskeletal: He exhibits no edema, tenderness or deformity  Neurological: He is alert and oriented to person, place, and time  GCS eye subscore is 4  GCS verbal subscore is 5  GCS motor subscore is 6  Skin: Skin is warm and dry  No rash noted  He is not diaphoretic  No erythema  No pallor  Psychiatric: He has a normal mood and affect  His behavior is normal    Nursing note and vitals reviewed  Additional Data:     Lab Results: I have personally reviewed pertinent reports  Results from last 7 days  Lab Units 11/15/18  0038   WBC Thousand/uL 10 97*   HEMOGLOBIN g/dL 15 7   HEMATOCRIT % 46 0   PLATELETS Thousands/uL 229   NEUTROS PCT % 64   LYMPHS PCT % 23   MONOS PCT % 10   EOS PCT % 2       Results from last 7 days  Lab Units 11/19/18  2136 11/15/18  0038   SODIUM mmol/L  --  139   POTASSIUM mmol/L  --  4 1   CHLORIDE mmol/L  --  104   CO2 mmol/L  --  26   CO2, I-STAT mmol/L 28  --    BUN mg/dL  --  30*   CREATININE mg/dL  --  1 30   ANION GAP mmol/L  --  9   CALCIUM mg/dL  --  9 1   ALBUMIN g/dL  --  3 8   TOTAL BILIRUBIN mg/dL  --  0 30   ALK PHOS U/L  --  70   ALT U/L  --  51   AST U/L  --  20   GLUCOSE RANDOM mg/dL  --  105             No results found for: HGBA1C            Imaging: I have personally reviewed pertinent reports        No orders to display       EKG, Pathology, and Other Studies Reviewed on Admission:   · EKG: NSR    ** Please Note: This note has been constructed using a voice recognition system   **

## 2018-11-20 NOTE — PROGRESS NOTES
Seclusive to room, lying in bed staring at the ceiling  Expressed readiness for discharge  Denies SI/HI or plan to harm self  Denies depression and anxiety  Verbalizes multiple supports within the community that he can speak with  Encouraged compliance with medications and OP appointments  No questions or concerns

## 2018-11-20 NOTE — PROGRESS NOTES
Pt up and asking for sleep medication  Explained we couldn't give after 3:00a  Gave PRN Ativan for anxiety  Effective for use  Pt sleeping

## 2018-11-20 NOTE — ASSESSMENT & PLAN NOTE
· Carboxyhemoglobin:  2 5 on 11/15, 2 2 11/16, 2 5 on 11/18  · Repeat in a m    · ABG unremarkable  · Patient denies smoking, contact with other sources of CO  · Patient does not require supplemental oxygen at this time  · Consider outpatient follow-up to identify possible endogenous sources of CO   · Patient should ensure that working carbon monoxide detectors installed in house

## 2018-11-20 NOTE — DISCHARGE INSTRUCTIONS
Depression   WHAT YOU NEED TO KNOW:   Depression is a medical condition that causes feelings of sadness or hopelessness that do not go away  Depression may cause you to lose interest in things you used to enjoy  These feelings may interfere with your daily life  DISCHARGE INSTRUCTIONS:   Call 911 for any of the following:   · You think about harming yourself or someone else  Contact your healthcare provider if:   · Your symptoms do not improve  · You cannot make it to your next appointment  · You have new symptoms  · You have questions or concerns about your condition or care  Medicines:   · Antidepressants  may be given to improve or balance your mood  You may need to take this medicine for several weeks before you begin to feel better  · Take your medicine as directed  Contact your healthcare provider if you think your medicine is not helping or if you have side effects  Tell him of her if you are allergic to any medicine  Keep a list of the medicines, vitamins, and herbs you take  Include the amounts, and when and why you take them  Bring the list or the pill bottles to follow-up visits  Carry your medicine list with you in case of an emergency  Therapy  may be used to treat your depression  A therapist will help you learn to cope with your thoughts and feelings  This can be done alone or in a group  It may also be done with family members or a significant other  Self-care:   · Get regular physical activity  Try to exercise for 30 minutes, 3 to 5 days a week  Work with your healthcare provider to develop an exercise plan that you enjoy  Physical activity may improve your symptoms  · Get enough sleep  Create a routine to help you relax before bed  You can listen to music, read, or do yoga  Try to go to bed and wake up at the same time every day  Sleep is important for emotional health  · Eat a variety of healthy foods from all of the food groups    A healthy meal plan is low in fat, salt, and added sugar  Ask your healthcare provider for more information about a meal plan that is right for you  · Do not drink alcohol or use drugs  Alcohol and drugs can make your symptoms worse  Follow up with your healthcare provider as directed: Your healthcare provider will monitor your progress at follow-up visits  He or she will also monitor your medicine if you take antidepressants  Your healthcare provider will ask if the medicine is helping  Tell him or her about any side effects or problems you may have with your medicine  The type or amount of medicine may need to be changed  Write down your questions so you remember to ask them during your visits  © 2017 [address]  Information is for End User's use only and may not be sold, redistributed or otherwise used for commercial purposes  All illustrations and images included in CareNotes® are the copyrighted property of A D A M , Inc  or [employer]  The above information is an  only  It is not intended as medical advice for individual conditions or treatments  Talk to your doctor, nurse or pharmacist before following any medical regimen to see if it is safe and effective for you  Anxiety   WHAT YOU SHOULD KNOW:   Anxiety is a condition that causes you to feel excessive worry, uneasiness, or fear  Family or work stress, smoking, caffeine, and alcohol can increase your risk for anxiety  Certain medicines or health conditions can also increase your risk  Anxiety may begin gradually, and can become a long-term condition if it is not managed or treated  AFTER YOU LEAVE:   Medicines:   · Medicines  can help you feel more calm and relaxed, and decrease your symptoms  · Take your medicine as directed  Contact your healthcare provider if you think your medicine is not helping or if you have side effects  Tell him if you are allergic to any medicine   Keep a list of the medicines, vitamins, and herbs you take  Include the amounts, and when and why you take them  Bring the list or the pill bottles to follow-up visits  Carry your medicine list with you in case of an emergency  Follow up with your healthcare provider within 2 weeks or as directed:  Write down your questions so you remember to ask them during your visits  Manage anxiety:   · Go to counseling as directed  Cognitive behavioral therapy can help you understand and change how you react to events that trigger your symptoms  · Find ways to manage your symptoms  Activities such as exercise, meditation, or listening to music can help you relax  · Practice deep breathing  Breathing can change how your body reacts to stress  Focus on taking slow, deep breaths several times a day, or during an anxiety attack  Breathe in through your nose, and out through your mouth  · Avoid caffeine  Caffeine can make your symptoms worse  Avoid foods or drinks that are meant to increase your energy level  · Limit or avoid alcohol  Ask your healthcare provider if alcohol is safe for you  You may not be able to drink alcohol if you take certain anxiety or depression medicines  Limit alcohol to 1 drink per day if you are a woman  Limit alcohol to 2 drinks per day if you are a man  A drink of alcohol is 12 ounces of beer, 5 ounces of wine, or 1½ ounces of liquor  Contact your healthcare provider if:   · Your symptoms get worse or do not get better with treatment  · You think your medicine may be causing side effects  · Your anxiety keeps you from doing your regular daily activities  · You have new symptoms since your last visit  · You have questions or concerns about your condition or care  Seek care immediately or call 911 if:   · You have chest pain, tightness, or heaviness that may spread to your shoulders, arms, jaw, neck, or back  · You feel like hurting yourself or someone else  · You feel dizzy, lightheaded, or faint    © 2014 [facility] [address] is for End User's use only and may not be sold, redistributed or otherwise used for commercial purposes  All illustrations and images included in CareNotes® are the copyrighted property of A [employer] M [employer] Inc  or [employer]  The above information is an  only  It is not intended as medical advice for individual conditions or treatments  Talk to your doctor, nurse or pharmacist before following any medical regimen to see if it is safe and effective for you

## 2021-02-28 ENCOUNTER — HOSPITAL ENCOUNTER (EMERGENCY)
Facility: HOSPITAL | Age: 56
End: 2021-02-28
Attending: EMERGENCY MEDICINE
Payer: COMMERCIAL

## 2021-02-28 ENCOUNTER — APPOINTMENT (EMERGENCY)
Dept: CT IMAGING | Facility: HOSPITAL | Age: 56
End: 2021-02-28
Payer: COMMERCIAL

## 2021-02-28 ENCOUNTER — ANESTHESIA EVENT (OUTPATIENT)
Dept: PERIOP | Facility: HOSPITAL | Age: 56
End: 2021-02-28
Payer: COMMERCIAL

## 2021-02-28 ENCOUNTER — ANESTHESIA (OUTPATIENT)
Dept: PERIOP | Facility: HOSPITAL | Age: 56
End: 2021-02-28
Payer: COMMERCIAL

## 2021-02-28 ENCOUNTER — APPOINTMENT (EMERGENCY)
Dept: RADIOLOGY | Facility: HOSPITAL | Age: 56
End: 2021-02-28
Payer: COMMERCIAL

## 2021-02-28 ENCOUNTER — HOSPITAL ENCOUNTER (OUTPATIENT)
Facility: HOSPITAL | Age: 56
LOS: 1 days | Discharge: HOME | End: 2021-02-28
Attending: FAMILY MEDICINE | Admitting: FAMILY MEDICINE
Payer: COMMERCIAL

## 2021-02-28 VITALS
RESPIRATION RATE: 16 BRPM | WEIGHT: 245 LBS | OXYGEN SATURATION: 95 % | BODY MASS INDEX: 38.45 KG/M2 | DIASTOLIC BLOOD PRESSURE: 80 MMHG | TEMPERATURE: 97.8 F | HEIGHT: 67 IN | SYSTOLIC BLOOD PRESSURE: 133 MMHG | HEART RATE: 85 BPM

## 2021-02-28 VITALS
DIASTOLIC BLOOD PRESSURE: 77 MMHG | HEART RATE: 81 BPM | OXYGEN SATURATION: 99 % | SYSTOLIC BLOOD PRESSURE: 127 MMHG | RESPIRATION RATE: 17 BRPM | TEMPERATURE: 98.1 F

## 2021-02-28 DIAGNOSIS — R10.9 LEFT FLANK PAIN: ICD-10-CM

## 2021-02-28 DIAGNOSIS — T39.1X1A TYLENOL TOXICITY: Primary | ICD-10-CM

## 2021-02-28 DIAGNOSIS — K62.5 RECTAL BLEED: ICD-10-CM

## 2021-02-28 DIAGNOSIS — N20.0 NEPHROLITHIASIS: Primary | ICD-10-CM

## 2021-02-28 DIAGNOSIS — N20.0 NEPHROLITHIASIS: ICD-10-CM

## 2021-02-28 PROBLEM — N20.1 LEFT URETERAL CALCULUS: Status: ACTIVE | Noted: 2021-02-28

## 2021-02-28 PROBLEM — R79.9 ABNORMAL BLOOD CHEMISTRY TEST: Status: ACTIVE | Noted: 2021-02-28

## 2021-02-28 PROBLEM — K21.9 GASTROESOPHAGEAL REFLUX DISEASE: Status: ACTIVE | Noted: 2021-02-28

## 2021-02-28 LAB
ALBUMIN SERPL BCP-MCNC: 4.1 G/DL (ref 3.4–4.8)
ALP SERPL-CCNC: 52.8 U/L (ref 10–129)
ALT SERPL W P-5'-P-CCNC: 28 U/L (ref 5–63)
ANION GAP SERPL CALCULATED.3IONS-SCNC: 10 MMOL/L (ref 4–13)
APAP SERPL-MCNC: 21.8 UG/ML (ref 10–20)
APTT PPP: 24 SECONDS (ref 23–31)
AST SERPL W P-5'-P-CCNC: 23 U/L (ref 15–41)
BACTERIA UR QL AUTO: ABNORMAL /HPF
BASOPHILS # BLD AUTO: 0.02 THOUSANDS/ΜL (ref 0–0.1)
BASOPHILS NFR BLD AUTO: 0 % (ref 0–1)
BILIRUB SERPL-MCNC: 0.53 MG/DL (ref 0.3–1.2)
BILIRUB UR QL STRIP: NEGATIVE
BUN SERPL-MCNC: 22 MG/DL (ref 6–20)
CALCIUM SERPL-MCNC: 8.9 MG/DL (ref 8.4–10.2)
CHLORIDE SERPL-SCNC: 102 MMOL/L (ref 96–108)
CLARITY UR: CLEAR
CO2 SERPL-SCNC: 23 MMOL/L (ref 22–33)
COLOR UR: YELLOW
CREAT SERPL-MCNC: 1.4 MG/DL (ref 0.5–1.2)
EOSINOPHIL # BLD AUTO: 0.06 THOUSAND/ΜL (ref 0–0.61)
EOSINOPHIL NFR BLD AUTO: 0 % (ref 0–6)
ERYTHROCYTE [DISTWIDTH] IN BLOOD BY AUTOMATED COUNT: 12.4 % (ref 11.6–15.1)
FLUAV RNA RESP QL NAA+PROBE: NEGATIVE
FLUBV RNA RESP QL NAA+PROBE: NEGATIVE
GFR SERPL CREATININE-BSD FRML MDRD: 56 ML/MIN/1.73SQ M
GLUCOSE SERPL-MCNC: 131 MG/DL (ref 65–140)
GLUCOSE UR STRIP-MCNC: NEGATIVE MG/DL
HCT VFR BLD AUTO: 44.3 % (ref 36.5–49.3)
HGB BLD-MCNC: 15.2 G/DL (ref 12–17)
HGB UR QL STRIP.AUTO: ABNORMAL
IMM GRANULOCYTES # BLD AUTO: 0.17 THOUSAND/UL (ref 0–0.2)
IMM GRANULOCYTES NFR BLD AUTO: 1 % (ref 0–2)
INR PPP: 1.03 (ref 0.9–1.1)
KETONES UR STRIP-MCNC: NEGATIVE MG/DL
LEUKOCYTE ESTERASE UR QL STRIP: NEGATIVE
LYMPHOCYTES # BLD AUTO: 1.49 THOUSANDS/ΜL (ref 0.6–4.47)
LYMPHOCYTES NFR BLD AUTO: 11 % (ref 14–44)
MCH RBC QN AUTO: 30 PG (ref 26.8–34.3)
MCHC RBC AUTO-ENTMCNC: 34.3 G/DL (ref 31.4–37.4)
MCV RBC AUTO: 87 FL (ref 82–98)
MONOCYTES # BLD AUTO: 1.12 THOUSAND/ΜL (ref 0.17–1.22)
MONOCYTES NFR BLD AUTO: 8 % (ref 4–12)
MUCOUS THREADS UR QL AUTO: ABNORMAL
NEUTROPHILS # BLD AUTO: 10.86 THOUSANDS/ΜL (ref 1.85–7.62)
NEUTS SEG NFR BLD AUTO: 80 % (ref 43–75)
NITRITE UR QL STRIP: NEGATIVE
NON-SQ EPI CELLS URNS QL MICRO: ABNORMAL /HPF
PH UR STRIP.AUTO: 5.5 [PH]
PLATELET # BLD AUTO: 221 THOUSANDS/UL (ref 149–390)
PMV BLD AUTO: 8.8 FL (ref 8.9–12.7)
POTASSIUM SERPL-SCNC: 4.1 MMOL/L (ref 3.5–5)
PROT SERPL-MCNC: 6.8 G/DL (ref 6.4–8.3)
PROT UR STRIP-MCNC: NEGATIVE MG/DL
PROTHROMBIN TIME: 11.6 SECONDS (ref 9.5–12.1)
RBC # BLD AUTO: 5.07 MILLION/UL (ref 3.88–5.62)
RBC #/AREA URNS AUTO: ABNORMAL /HPF
RSV RNA RESP QL NAA+PROBE: NEGATIVE
SARS-COV-2 RNA RESP QL NAA+PROBE: NEGATIVE
SODIUM SERPL-SCNC: 135 MMOL/L (ref 133–145)
SP GR UR STRIP.AUTO: 1.01 (ref 1–1.03)
UROBILINOGEN UR QL STRIP.AUTO: 0.2 E.U./DL
WBC # BLD AUTO: 13.72 THOUSAND/UL (ref 4.31–10.16)
WBC #/AREA URNS AUTO: ABNORMAL /HPF

## 2021-02-28 PROCEDURE — 85730 THROMBOPLASTIN TIME PARTIAL: CPT | Performed by: EMERGENCY MEDICINE

## 2021-02-28 PROCEDURE — 74018 RADEX ABDOMEN 1 VIEW: CPT

## 2021-02-28 PROCEDURE — 99284 EMERGENCY DEPT VISIT MOD MDM: CPT | Performed by: EMERGENCY MEDICINE

## 2021-02-28 PROCEDURE — 80053 COMPREHEN METABOLIC PANEL: CPT | Performed by: EMERGENCY MEDICINE

## 2021-02-28 PROCEDURE — 88300 SURGICAL PATH GROSS: CPT | Performed by: PATHOLOGY

## 2021-02-28 PROCEDURE — 85025 COMPLETE CBC W/AUTO DIFF WBC: CPT | Performed by: EMERGENCY MEDICINE

## 2021-02-28 PROCEDURE — 99285 EMERGENCY DEPT VISIT HI MDM: CPT

## 2021-02-28 PROCEDURE — 80143 DRUG ASSAY ACETAMINOPHEN: CPT | Performed by: EMERGENCY MEDICINE

## 2021-02-28 PROCEDURE — 99448 NTRPROF PH1/NTRNET/EHR 21-30: CPT | Performed by: EMERGENCY MEDICINE

## 2021-02-28 PROCEDURE — 81001 URINALYSIS AUTO W/SCOPE: CPT | Performed by: EMERGENCY MEDICINE

## 2021-02-28 PROCEDURE — 99219 PR INITIAL OBSERVATION CARE/DAY 50 MINUTES: CPT | Performed by: FAMILY MEDICINE

## 2021-02-28 PROCEDURE — 82360 CALCULUS ASSAY QUANT: CPT | Performed by: SPECIALIST

## 2021-02-28 PROCEDURE — 74176 CT ABD & PELVIS W/O CONTRAST: CPT

## 2021-02-28 PROCEDURE — C2617 STENT, NON-COR, TEM W/O DEL: HCPCS | Performed by: SPECIALIST

## 2021-02-28 PROCEDURE — 96375 TX/PRO/DX INJ NEW DRUG ADDON: CPT

## 2021-02-28 PROCEDURE — 36415 COLL VENOUS BLD VENIPUNCTURE: CPT | Performed by: EMERGENCY MEDICINE

## 2021-02-28 PROCEDURE — C1769 GUIDE WIRE: HCPCS | Performed by: SPECIALIST

## 2021-02-28 PROCEDURE — 96365 THER/PROPH/DIAG IV INF INIT: CPT

## 2021-02-28 PROCEDURE — 0241U HB NFCT DS VIR RESP RNA 4 TRGT: CPT | Performed by: EMERGENCY MEDICINE

## 2021-02-28 PROCEDURE — 85610 PROTHROMBIN TIME: CPT | Performed by: EMERGENCY MEDICINE

## 2021-02-28 DEVICE — INLAY URETERAL STENT W/O GUIDEWIRE
Type: IMPLANTABLE DEVICE | Status: NON-FUNCTIONAL
Brand: BARD® INLAY® URETERAL STENT
Removed: 2022-05-09

## 2021-02-28 RX ORDER — ONDANSETRON 2 MG/ML
4 INJECTION INTRAMUSCULAR; INTRAVENOUS ONCE AS NEEDED
Status: DISCONTINUED | OUTPATIENT
Start: 2021-02-28 | End: 2021-02-28 | Stop reason: HOSPADM

## 2021-02-28 RX ORDER — LEVOFLOXACIN 5 MG/ML
500 INJECTION, SOLUTION INTRAVENOUS ONCE
Status: COMPLETED | OUTPATIENT
Start: 2021-02-28 | End: 2021-02-28

## 2021-02-28 RX ORDER — PROPOFOL 10 MG/ML
INJECTION, EMULSION INTRAVENOUS AS NEEDED
Status: DISCONTINUED | OUTPATIENT
Start: 2021-02-28 | End: 2021-02-28

## 2021-02-28 RX ORDER — CEFTRIAXONE 1 G/50ML
1000 INJECTION, SOLUTION INTRAVENOUS ONCE
Status: COMPLETED | OUTPATIENT
Start: 2021-02-28 | End: 2021-02-28

## 2021-02-28 RX ORDER — ONDANSETRON 2 MG/ML
INJECTION INTRAMUSCULAR; INTRAVENOUS AS NEEDED
Status: DISCONTINUED | OUTPATIENT
Start: 2021-02-28 | End: 2021-02-28

## 2021-02-28 RX ORDER — PROMETHAZINE HYDROCHLORIDE 25 MG/ML
12.5 INJECTION, SOLUTION INTRAMUSCULAR; INTRAVENOUS ONCE AS NEEDED
Status: DISCONTINUED | OUTPATIENT
Start: 2021-02-28 | End: 2021-02-28 | Stop reason: HOSPADM

## 2021-02-28 RX ORDER — LIDOCAINE HYDROCHLORIDE 10 MG/ML
INJECTION, SOLUTION EPIDURAL; INFILTRATION; INTRACAUDAL; PERINEURAL AS NEEDED
Status: DISCONTINUED | OUTPATIENT
Start: 2021-02-28 | End: 2021-02-28

## 2021-02-28 RX ORDER — MORPHINE SULFATE 4 MG/ML
4 INJECTION, SOLUTION INTRAMUSCULAR; INTRAVENOUS EVERY 4 HOURS PRN
Status: DISCONTINUED | OUTPATIENT
Start: 2021-02-28 | End: 2021-02-28 | Stop reason: HOSPADM

## 2021-02-28 RX ORDER — LEVOFLOXACIN 5 MG/ML
500 INJECTION, SOLUTION INTRAVENOUS ONCE
Status: CANCELLED | OUTPATIENT
Start: 2021-02-28 | End: 2021-02-28

## 2021-02-28 RX ORDER — FENTANYL CITRATE 50 UG/ML
25 INJECTION, SOLUTION INTRAMUSCULAR; INTRAVENOUS ONCE
Status: COMPLETED | OUTPATIENT
Start: 2021-02-28 | End: 2021-02-28

## 2021-02-28 RX ORDER — MEPERIDINE HYDROCHLORIDE 25 MG/ML
12.5 INJECTION INTRAMUSCULAR; INTRAVENOUS; SUBCUTANEOUS
Status: DISCONTINUED | OUTPATIENT
Start: 2021-02-28 | End: 2021-02-28 | Stop reason: HOSPADM

## 2021-02-28 RX ORDER — FENTANYL CITRATE 50 UG/ML
INJECTION, SOLUTION INTRAMUSCULAR; INTRAVENOUS AS NEEDED
Status: DISCONTINUED | OUTPATIENT
Start: 2021-02-28 | End: 2021-02-28

## 2021-02-28 RX ORDER — KETOROLAC TROMETHAMINE 30 MG/ML
15 INJECTION, SOLUTION INTRAMUSCULAR; INTRAVENOUS ONCE
Status: COMPLETED | OUTPATIENT
Start: 2021-02-28 | End: 2021-02-28

## 2021-02-28 RX ORDER — ONDANSETRON 2 MG/ML
4 INJECTION INTRAMUSCULAR; INTRAVENOUS ONCE
Status: COMPLETED | OUTPATIENT
Start: 2021-02-28 | End: 2021-02-28

## 2021-02-28 RX ORDER — SODIUM CHLORIDE 9 MG/ML
INJECTION, SOLUTION INTRAVENOUS CONTINUOUS PRN
Status: DISCONTINUED | OUTPATIENT
Start: 2021-02-28 | End: 2021-02-28

## 2021-02-28 RX ORDER — MIDAZOLAM HYDROCHLORIDE 2 MG/2ML
INJECTION, SOLUTION INTRAMUSCULAR; INTRAVENOUS AS NEEDED
Status: DISCONTINUED | OUTPATIENT
Start: 2021-02-28 | End: 2021-02-28

## 2021-02-28 RX ORDER — FENTANYL CITRATE/PF 50 MCG/ML
50 SYRINGE (ML) INJECTION
Status: DISCONTINUED | OUTPATIENT
Start: 2021-02-28 | End: 2021-02-28 | Stop reason: HOSPADM

## 2021-02-28 RX ORDER — MAGNESIUM HYDROXIDE 1200 MG/15ML
LIQUID ORAL AS NEEDED
Status: DISCONTINUED | OUTPATIENT
Start: 2021-02-28 | End: 2021-02-28 | Stop reason: HOSPADM

## 2021-02-28 RX ADMIN — SODIUM CHLORIDE 1000 ML: 0.9 INJECTION, SOLUTION INTRAVENOUS at 07:30

## 2021-02-28 RX ADMIN — SODIUM CHLORIDE: 0.9 INJECTION, SOLUTION INTRAVENOUS at 14:23

## 2021-02-28 RX ADMIN — KETOROLAC TROMETHAMINE 15 MG: 30 INJECTION, SOLUTION INTRAMUSCULAR at 06:48

## 2021-02-28 RX ADMIN — LIDOCAINE HYDROCHLORIDE 50 MG: 10 INJECTION, SOLUTION EPIDURAL; INFILTRATION; INTRACAUDAL; PERINEURAL at 14:24

## 2021-02-28 RX ADMIN — PROPOFOL 200 MG: 10 INJECTION, EMULSION INTRAVENOUS at 14:25

## 2021-02-28 RX ADMIN — LEVOFLOXACIN: 5 INJECTION, SOLUTION INTRAVENOUS at 14:31

## 2021-02-28 RX ADMIN — ONDANSETRON 4 MG: 2 INJECTION INTRAMUSCULAR; INTRAVENOUS at 14:25

## 2021-02-28 RX ADMIN — FENTANYL CITRATE 100 MCG: 50 INJECTION, SOLUTION INTRAMUSCULAR; INTRAVENOUS at 14:24

## 2021-02-28 RX ADMIN — MORPHINE SULFATE 4 MG: 4 INJECTION INTRAVENOUS at 16:56

## 2021-02-28 RX ADMIN — CEFTRIAXONE 1000 MG: 1 INJECTION, SOLUTION INTRAVENOUS at 07:46

## 2021-02-28 RX ADMIN — MIDAZOLAM 2 MG: 1 INJECTION INTRAMUSCULAR; INTRAVENOUS at 14:24

## 2021-02-28 RX ADMIN — ONDANSETRON 4 MG: 2 INJECTION INTRAMUSCULAR; INTRAVENOUS at 06:48

## 2021-02-28 RX ADMIN — FENTANYL CITRATE 25 MCG: 50 INJECTION INTRAMUSCULAR; INTRAVENOUS at 12:33

## 2021-02-28 NOTE — EMTALA/ACUTE CARE TRANSFER
[facility] EMERGENCY DEPARTMENT  [address] [facility] [address]  Dept: [phone]      EMTALA TRANSFER CONSENT    NAME Cory Jeffres                                         1965                              MRN [id number]    I have been informed of my rights regarding examination, treatment, and transfer   by Dr Bambi Grubbs MD    Benefits: Specialized equipment and/or services available at the receiving facility (Include comment)________________________(urology)    Risks: Potential for delay in receiving treatment, Potential deterioration of medical condition, Loss of IV, Increased discomfort during transfer, Possible worsening of condition or death during transfer      Consent for Transfer:  I acknowledge that my medical condition has been evaluated and explained to me by the emergency department physician or other qualified medical person and/or my attending physician, who has recommended that I be transferred to the service of  Accepting Physician: Anjel Guerrero MD at [address] [place] [address] Name, [address] : Natalie Madrid  The above potential benefits of such transfer, the potential risks associated with such transfer, and the probable risks of not being transferred have been explained to me, and I fully understand them  The doctor has explained that, in my case, the benefits of transfer outweigh the risks  I agree to be transferred  I authorize the performance of emergency medical procedures and treatments upon me in both transit and upon arrival at the receiving facility  Additionally, I authorize the release of any and all medical records to the receiving facility and request they be transported with me, if possible  I understand that the safest mode of transportation during a medical emergency is an ambulance and that the Hospital advocates the use of this mode of transport   Risks of traveling to the receiving facility by car, including absence of medical control, life sustaining equipment, such as oxygen, and medical personnel has been explained to me and I fully understand them  (BRANNON CORRECT BOX BELOW)  [  ]  I consent to the stated transfer and to be transported by ambulance/helicopter  [  ]  I consent to the stated transfer, but refuse transportation by ambulance and accept full responsibility for my transportation by car  I understand the risks of non-ambulance transfers and I exonerate the Hospital and its staff from any deterioration in my condition that results from this refusal     X___________________________________________    DATE  21  TIME________  Signature of patient or legally responsible individual signing on patient behalf           RELATIONSHIP TO PATIENT_________________________          Provider Certification    NAME Martínez Vazquez                                         1965                              MRN [id number]    A medical screening exam was performed on the above named patient  Based on the examination:    Condition Necessitating Transfer The primary encounter diagnosis was Nephrolithiasis  Diagnoses of Left flank pain and Rectal bleed were also pertinent to this visit      Patient Condition: The patient has been stabilized such that within reasonable medical probability, no material deterioration of the patient condition or the condition of the unborn child(eleanor) is likely to result from the transfer    Reason for Transfer: Level of Care needed not available at this facility    Transfer Requirements: [facility]  [address]    · Space available and qualified personnel available for treatment as acknowledged by    · Agreed to accept transfer and to provide appropriate medical treatment as acknowledged by       Man Johnson MD  · Appropriate medical records of the examination and treatment of the patient are provided at the time of transfer   [address] _______  · Transfer will be performed by qualified personnel from    and appropriate transfer equipment as required, including the use of necessary and appropriate life support measures  Provider Certification: I have examined the patient and explained the following risks and benefits of being transferred/refusing transfer to the patient/family:  General risk, such as traffic hazards, adverse weather conditions, rough terrain or turbulence, possible failure of equipment (including vehicle or aircraft), or consequences of actions of persons outside the control of the transport personnel, Risk of worsening condition, The possibility of a transport vehicle being unavailable, Unanticipated needs of medical equipment and personnel during transport      Based on these reasonable risks and benefits to the patient and/or the unborn child(eleanor), and based upon the information available at the time of the patients examination, I certify that the medical benefits reasonably to be expected from the provision of appropriate medical treatments at another medical facility outweigh the increasing risks, if any, to the individuals medical condition, and in the case of labor to the unborn child, from effecting the transfer      X____________________________________________ DATE 02/28/21        TIME_______      ORIGINAL - SEND TO MEDICAL RECORDS   COPY - SEND WITH PATIENT DURING TRANSFER

## 2021-02-28 NOTE — CONSULTS
H&P Exam - Urology       Patient: Alonzo Diaz   : 1965 Sex: male   MRN: [id number]     CSN: [id number]      History of Present Illness   HPI:  Alonzo Diaz is a 63 y o  male who presents with 8-9mm lt distal ureteral stone pain on/for x 1-2 years  pain he should has known about stones for the last 2 years but did not have insurance and though has had bilateral flank pain deferred for making office visit denies fevers chills at this time but does wish to deal with stone realizes that cannot have surgery at [facility] over weekend and will be transferred to [facility] today for ureteroscopy laser lithotripsy        Review of Systems:   Constitutional:  Negative for activity change, fever, chills and diaphoresis  HENT: Negative for hearing loss and trouble swallowing  Eyes: Negative for itching and visual disturbance  Respiratory: Negative for chest tightness and shortness of breath  Cardiovascular: Negative for chest pain, edema  Gastrointestinal: Negative for abdominal distention, na abdominal pain, constipation, diarrhea, Nausea and vomiting  Genitourinary: Negative for decreased urine volume, difficulty urinating, dysuria, enuresis, frequency, hematuria and urgency  Musculoskeletal: Negative for gait problem and myalgias  Neurological: Negative for dizziness and headaches  Hematological: Does not bruise/bleed easily         Historical Information   Past Medical History:   Diagnosis Date    Anxiety     Depression     GERD (gastroesophageal reflux disease)      Past Surgical History:   Procedure Laterality Date    ADENOIDECTOMY      CHOLECYSTECTOMY      INGUINAL HERNIA REPAIR      TONSILLECTOMY       Social History   Social History     Substance and Sexual Activity   Alcohol Use No     Social History     Substance and Sexual Activity   Drug Use No     Social History     Tobacco Use   Smoking Status Never Smoker   Smokeless Tobacco Never Used     Family History: History reviewed  No pertinent family history  Meds/Allergies   (Not in a hospital admission)    No Known Allergies    Objective   Vitals: /86 (BP Location: Left arm)   Pulse 87   Temp 98 1 °F (36 7 °C) (Oral)   Resp 18   SpO2 99%     Physical Exam:  General Alert awake   Normocephalic atraumatic PERRLA  Lungs clear bilaterally  Cardiac normal S1 normal S2  Abdomen soft, flank pain  Extremities no edema    I/O last 24 hours:   In: 1050 [IV Piggyback:1050]  Out: -     Invasive Devices     Peripheral Intravenous Line            Peripheral IV 02/28/21 Right Hand less than 1 day                    Lab Results: CBC:   Lab Results   Component Value Date    WBC 13 72 (H) 02/28/2021    HGB 15 2 02/28/2021    HCT 44 3 02/28/2021    MCV 87 02/28/2021     02/28/2021    MCH 30 0 02/28/2021    MCHC 34 3 02/28/2021    RDW 12 4 02/28/2021    MPV 8 8 (L) 02/28/2021    NRBC 0 11/15/2018     CMP:   Lab Results   Component Value Date     02/28/2021    CO2 23 02/28/2021    CO2 28 11/19/2018    BUN 22 (H) 02/28/2021    CREATININE 1 40 (H) 02/28/2021    CALCIUM 8 9 02/28/2021    AST 23 02/28/2021    ALT 28 02/28/2021    ALKPHOS 52 8 02/28/2021    EGFR 56 02/28/2021     Urinalysis:   Lab Results   Component Value Date    COLORU Yellow 02/28/2021    CLARITYU Clear 02/28/2021    SPECGRAV 1 010 02/28/2021    PHUR 5 5 02/28/2021    LEUKOCYTESUR Negative 02/28/2021    NITRITE Negative 02/28/2021    GLUCOSEU Negative 02/28/2021    KETONESU Negative 02/28/2021    BILIRUBINUR Negative 02/28/2021    BLOODU Trace-Intact (A) 02/28/2021     Urine Culture: No results found for: URINECX  PSA: No results found for: PSA        Assessment/ Plan:  Npo  Transfer to [facility]  Cysto/left ureteroscopy/laser,stent  Aware risk of infection bleeding anesthesia additional urologic procedures      Hemalatha Muro MD

## 2021-02-28 NOTE — ANESTHESIA POSTPROCEDURE EVALUATION
Post-Op Assessment Note    CV Status:  Stable  Pain Score: 0    Pain management: adequate     Mental Status:  Awake   Hydration Status:  Stable   PONV Controlled:  None   Airway Patency:  Patent      Post Op Vitals Reviewed: Yes      Staff: Anesthesiologist         No complications documented      /79 (02/28/21 1508)    Temp 97 8 °F (36 6 °C) (02/28/21 1508)    Pulse 87 (02/28/21 1508)   Resp 18 (02/28/21 1508)    SpO2

## 2021-02-28 NOTE — ED PROVIDER NOTES
History  Chief Complaint   Patient presents with    Flank Pain     pt presents to ed for flank pain and bloody stool that started this morning     This is a 42-year-old male with a history of a left renal calculi  At the calculus was diagnosed here in this ED when it was [facility]   He was in November of 2019  The size of the calculus in the left ureter was 7 mm  Patient did not follow-up as he did not have insurance at that time  Patient reports that last night about 11:00 a m  He developed sudden onset of left-sided flank pain  He took 2500 mg of Tylenol p m  He states that he took Tylenol again prior to arrival   Reports that he got minimal sleep between 2 and 3:00 a m  This morning  He states that the pain is now 5/10 and has improved from the original onset  Patient denies any pain with urination  Denies decreased urination  Denies hematuria  Denies fever chills  Patient has had nausea but no vomiting  Patient had a cholecystectomy in the past when his sodium was [facility]   He has had what he described as intermittent dumping syndrome  States this morning he had a diarrhea stool and there was blood in it and was concerning for him  He denies any lightheadedness  I is not on any blood thinners  There is no aggravating or alleviating factors for patient's symptoms  No radiation of pain  Prior to Admission Medications   Prescriptions Last Dose Informant Patient Reported? Taking?   omeprazole (PriLOSEC) 40 MG capsule   No No   Sig: Take 1 capsule (40 mg total) by mouth daily      Facility-Administered Medications: None       Past Medical History:   Diagnosis Date    Anxiety     Depression     GERD (gastroesophageal reflux disease)        Past Surgical History:   Procedure Laterality Date    ADENOIDECTOMY      CHOLECYSTECTOMY      INGUINAL HERNIA REPAIR      TONSILLECTOMY         History reviewed  No pertinent family history    I have reviewed and agree with the history as documented  E-Cigarette/Vaping     E-Cigarette/Vaping Substances     Social History     Tobacco Use    Smoking status: Never Smoker    Smokeless tobacco: Never Used   Substance Use Topics    Alcohol use: No    Drug use: No       Review of Systems   Constitutional: Negative for activity change, appetite change, chills, diaphoresis, fatigue, fever and unexpected weight change  HENT: Negative for congestion, ear discharge, ear pain, mouth sores, sinus pressure, sinus pain, sneezing, sore throat, trouble swallowing and voice change  Eyes: Negative for photophobia, pain, discharge, redness, itching and visual disturbance  Respiratory: Negative for cough, chest tightness and shortness of breath  Cardiovascular: Negative for chest pain, palpitations and leg swelling  Gastrointestinal: Positive for blood in stool, diarrhea and nausea  Negative for abdominal pain, constipation and vomiting  Endocrine: Negative for cold intolerance, heat intolerance, polydipsia, polyphagia and polyuria  Genitourinary: Positive for flank pain  Negative for decreased urine volume, difficulty urinating, dysuria, frequency, hematuria and urgency  Known left ureteral calculi   Musculoskeletal: Negative for arthralgias, back pain, gait problem, joint swelling, myalgias, neck pain and neck stiffness  Skin: Negative for color change and rash  Allergic/Immunologic: Negative for immunocompromised state  Neurological: Negative for dizziness, tremors, seizures, syncope, speech difficulty, weakness, light-headedness, numbness and headaches  Hematological: Does not bruise/bleed easily  Psychiatric/Behavioral: Negative for behavioral problems and suicidal ideas  Physical Exam  Physical Exam  Vitals signs and nursing note reviewed  Constitutional:       General: He is not in acute distress  Appearance: Normal appearance  He is well-developed and normal weight   He is not ill-appearing, toxic-appearing or diaphoretic  HENT:      Head: Normocephalic and atraumatic  Right Ear: Tympanic membrane, ear canal and external ear normal  There is no impacted cerumen  Left Ear: Tympanic membrane, ear canal and external ear normal  There is no impacted cerumen  Nose: No congestion or rhinorrhea  Mouth/Throat:      Mouth: Mucous membranes are moist       Pharynx: Oropharynx is clear  No oropharyngeal exudate or posterior oropharyngeal erythema  Eyes:      General: No scleral icterus  Right eye: No discharge  Left eye: No discharge  Extraocular Movements: Extraocular movements intact  Conjunctiva/sclera: Conjunctivae normal       Pupils: Pupils are equal, round, and reactive to light  Neck:      Musculoskeletal: Normal range of motion and neck supple  No neck rigidity or muscular tenderness  Vascular: No JVD  Trachea: No tracheal deviation  Cardiovascular:      Rate and Rhythm: Normal rate and regular rhythm  Heart sounds: Normal heart sounds  No murmur  Pulmonary:      Effort: Pulmonary effort is normal  No respiratory distress  Breath sounds: Normal breath sounds  No wheezing or rales  Chest:      Chest wall: No tenderness  Abdominal:      General: Bowel sounds are normal       Palpations: Abdomen is soft  There is no mass  Tenderness: There is no abdominal tenderness  There is no right CVA tenderness, left CVA tenderness or guarding  Hernia: No hernia is present  Genitourinary:     Comments: Soft liquid stool  Tan in color  Sample sent to lab for analysis for occult blood  Musculoskeletal: Normal range of motion  General: No swelling, tenderness, deformity or signs of injury  Right lower leg: No edema  Left lower leg: No edema  Lymphadenopathy:      Cervical: No cervical adenopathy  Skin:     General: Skin is warm and dry  Capillary Refill: Capillary refill takes less than 2 seconds  Findings: No bruising, erythema or rash  Neurological:      General: No focal deficit present  Mental Status: He is alert and oriented to person, place, and time  Mental status is at baseline  Cranial Nerves: No cranial nerve deficit  Sensory: No sensory deficit  Motor: No weakness or abnormal muscle tone  Coordination: Coordination normal       Gait: Gait normal       Deep Tendon Reflexes: Reflexes normal    Psychiatric:         Mood and Affect: Mood normal          Behavior: Behavior normal          Thought Content:  Thought content normal          Judgment: Judgment normal          Vital Signs  ED Triage Vitals [02/28/21 0556]   Temperature Pulse Respirations Blood Pressure SpO2   98 1 °F (36 7 °C) 95 17 142/76 98 %      Temp Source Heart Rate Source Patient Position - Orthostatic VS BP Location FiO2 (%)   Oral Monitor -- Right arm --      Pain Score       No Pain           Vitals:    02/28/21 0556   BP: 142/76   Pulse: 95         Visual Acuity      ED Medications  Medications   sodium chloride 0 9 % bolus 1,000 mL (has no administration in time range)   ketorolac (TORADOL) injection 15 mg (15 mg Intravenous Given 2/28/21 0648)   ondansetron (ZOFRAN) injection 4 mg (4 mg Intravenous Given 2/28/21 0648)       Diagnostic Studies  Results Reviewed     Procedure Component Value Units Date/Time    CBC and differential [[id number]] Collected: 02/28/21 0647    Lab Status: No result Specimen: Blood from Arm, Right     Comprehensive metabolic panel [[id number]] Collected: 02/28/21 0647    Lab Status: No result Specimen: Blood from Hand, Right     Protime-INR [[id number]] Collected: 02/28/21 0647    Lab Status: No result Specimen: Blood from Arm, Right     APTT [[id number]] Collected: 02/28/21 0647    Lab Status: No result Specimen: Blood from Arm, Right     Acetaminophen level-"If concentration is detectable, please discuss with medical  on call " [[id number]]     Lab Status: No result Specimen: Blood     UA w Reflex to Microscopic w Reflex to Culture [[id number]]     Lab Status: No result Specimen: Urine, Clean Catch                  CT renal stone study abdomen pelvis wo contrast    (Results Pending)              Procedures  Procedures         ED Course  ED Course as of Feb 28 0649   Ventura Lopez Feb 28, 2021   5590 Report given to Dr Khloe Anderson at the end of my shift after report given pending labs and CT scan  SBIRT 20yo+      Most Recent Value   SBIRT (22 yo +)   In order to provide better care to our patients, we are screening all of our patients for alcohol and drug use  Would it be okay to ask you these screening questions? No Filed at: 02/28/2021 0557                    MDM  Number of Diagnoses or Management Options  Diagnosis management comments: Obstructing stone, septic stone, sepsis, UTI, pyelonephritis, dumping syndrome, rectal bleeding       Amount and/or Complexity of Data Reviewed  Clinical lab tests: ordered  Tests in the radiology section of CPT®: ordered    Risk of Complications, Morbidity, and/or Mortality  Presenting problems: high  Diagnostic procedures: moderate  Management options: moderate        Disposition  Final diagnoses:   Left flank pain   Rectal bleed     Time reflects when diagnosis was documented in both MDM as applicable and the Disposition within this note     Time User Action Codes Description Comment    2/28/2021  6:48 AM Booker Woods Add [R10 9] Left flank pain     2/28/2021  6:49 AM Booker Woods Add [K62 5] Rectal bleed       ED Disposition     None      Follow-up Information    None         Patient's Medications   Discharge Prescriptions    No medications on file     No discharge procedures on file      PDMP Review     None          ED Provider  Electronically Signed by           Janice Hancock MD  02/28/21 5718

## 2021-03-11 LAB
COLOR STONE: NORMAL
COM MFR STONE: 100 %
COMMENT-STONE3: NORMAL
COMPOSITION: NORMAL
LABORATORY COMMENT REPORT: NORMAL
PHOTO: NORMAL
SIZE STONE: NORMAL MM
SPEC SOURCE SUBJ: NORMAL
STONE ANALYSIS-IMP: NORMAL
WT STONE: 44 MG

## 2021-03-15 ENCOUNTER — APPOINTMENT (EMERGENCY)
Dept: RADIOLOGY | Facility: HOSPITAL | Age: 56
End: 2021-03-15
Payer: COMMERCIAL

## 2021-03-15 ENCOUNTER — HOSPITAL ENCOUNTER (OUTPATIENT)
Facility: HOSPITAL | Age: 56
Setting detail: OBSERVATION
Discharge: HOME | End: 2021-03-16
Attending: GENERAL PRACTICE | Admitting: INTERNAL MEDICINE
Payer: COMMERCIAL

## 2021-03-15 DIAGNOSIS — D72.829 LEUKOCYTOSIS, UNSPECIFIED TYPE: ICD-10-CM

## 2021-03-15 DIAGNOSIS — R10.9 FLANK PAIN: Primary | ICD-10-CM

## 2021-03-15 DIAGNOSIS — N20.0 NEPHROLITHIASIS: ICD-10-CM

## 2021-03-15 PROBLEM — R65.10 SIRS (SYSTEMIC INFLAMMATORY RESPONSE SYNDROME) (HCC): Status: ACTIVE | Noted: 2021-03-15

## 2021-03-15 PROBLEM — E66.9 OBESITY (BMI 35.0-39.9 WITHOUT COMORBIDITY): Status: ACTIVE | Noted: 2021-03-15

## 2021-03-15 PROBLEM — K76.0 HEPATIC STEATOSIS: Status: ACTIVE | Noted: 2021-03-15

## 2021-03-15 PROBLEM — K65.4 MESENTERIC PANNICULITIS (HCC): Status: ACTIVE | Noted: 2021-03-15

## 2021-03-15 LAB
ALBUMIN SERPL BCP-MCNC: 3.6 G/DL (ref 3.5–5)
ALP SERPL-CCNC: 69 U/L (ref 46–116)
ALT SERPL W P-5'-P-CCNC: 43 U/L (ref 12–78)
ANION GAP SERPL CALCULATED.3IONS-SCNC: 7 MMOL/L (ref 4–13)
AST SERPL W P-5'-P-CCNC: 26 U/L (ref 5–45)
BASOPHILS # BLD AUTO: 0.06 THOUSANDS/ΜL (ref 0–0.1)
BASOPHILS NFR BLD AUTO: 0 % (ref 0–1)
BILIRUB SERPL-MCNC: 0.4 MG/DL (ref 0.2–1)
BILIRUB UR QL STRIP: NEGATIVE
BUN SERPL-MCNC: 19 MG/DL (ref 5–25)
CALCIUM SERPL-MCNC: 8.8 MG/DL (ref 8.3–10.1)
CHLORIDE SERPL-SCNC: 102 MMOL/L (ref 100–108)
CLARITY UR: CLEAR
CO2 SERPL-SCNC: 26 MMOL/L (ref 21–32)
COLOR UR: YELLOW
CREAT SERPL-MCNC: 1.21 MG/DL (ref 0.6–1.3)
EOSINOPHIL # BLD AUTO: 0.26 THOUSAND/ΜL (ref 0–0.61)
EOSINOPHIL NFR BLD AUTO: 2 % (ref 0–6)
ERYTHROCYTE [DISTWIDTH] IN BLOOD BY AUTOMATED COUNT: 11.8 % (ref 11.6–15.1)
GFR SERPL CREATININE-BSD FRML MDRD: 67 ML/MIN/1.73SQ M
GLUCOSE SERPL-MCNC: 100 MG/DL (ref 65–140)
GLUCOSE UR STRIP-MCNC: NEGATIVE MG/DL
HCT VFR BLD AUTO: 47 % (ref 36.5–49.3)
HGB BLD-MCNC: 15.7 G/DL (ref 12–17)
HGB UR QL STRIP.AUTO: NEGATIVE
IMM GRANULOCYTES # BLD AUTO: 0.17 THOUSAND/UL (ref 0–0.2)
IMM GRANULOCYTES NFR BLD AUTO: 1 % (ref 0–2)
KETONES UR STRIP-MCNC: NEGATIVE MG/DL
LACTATE SERPL-SCNC: 1 MMOL/L (ref 0.5–2)
LEUKOCYTE ESTERASE UR QL STRIP: NEGATIVE
LYMPHOCYTES # BLD AUTO: 2.1 THOUSANDS/ΜL (ref 0.6–4.47)
LYMPHOCYTES NFR BLD AUTO: 12 % (ref 14–44)
MCH RBC QN AUTO: 29.7 PG (ref 26.8–34.3)
MCHC RBC AUTO-ENTMCNC: 33.4 G/DL (ref 31.4–37.4)
MCV RBC AUTO: 89 FL (ref 82–98)
MONOCYTES # BLD AUTO: 1.26 THOUSAND/ΜL (ref 0.17–1.22)
MONOCYTES NFR BLD AUTO: 7 % (ref 4–12)
NEUTROPHILS # BLD AUTO: 13.78 THOUSANDS/ΜL (ref 1.85–7.62)
NEUTS SEG NFR BLD AUTO: 78 % (ref 43–75)
NITRITE UR QL STRIP: NEGATIVE
NRBC BLD AUTO-RTO: 0 /100 WBCS
PH UR STRIP.AUTO: 6 [PH]
PLATELET # BLD AUTO: 259 THOUSANDS/UL (ref 149–390)
PMV BLD AUTO: 8.9 FL (ref 8.9–12.7)
POTASSIUM SERPL-SCNC: 4.3 MMOL/L (ref 3.5–5.3)
PROT SERPL-MCNC: 7 G/DL (ref 6.4–8.2)
PROT UR STRIP-MCNC: NEGATIVE MG/DL
RBC # BLD AUTO: 5.29 MILLION/UL (ref 3.88–5.62)
SODIUM SERPL-SCNC: 135 MMOL/L (ref 136–145)
SP GR UR STRIP.AUTO: 1.02 (ref 1–1.03)
UROBILINOGEN UR QL STRIP.AUTO: 0.2 E.U./DL
WBC # BLD AUTO: 17.63 THOUSAND/UL (ref 4.31–10.16)

## 2021-03-15 PROCEDURE — 81003 URINALYSIS AUTO W/O SCOPE: CPT | Performed by: GENERAL PRACTICE

## 2021-03-15 PROCEDURE — 85025 COMPLETE CBC W/AUTO DIFF WBC: CPT | Performed by: GENERAL PRACTICE

## 2021-03-15 PROCEDURE — 96375 TX/PRO/DX INJ NEW DRUG ADDON: CPT

## 2021-03-15 PROCEDURE — 36415 COLL VENOUS BLD VENIPUNCTURE: CPT | Performed by: GENERAL PRACTICE

## 2021-03-15 PROCEDURE — 84145 PROCALCITONIN (PCT): CPT | Performed by: NURSE PRACTITIONER

## 2021-03-15 PROCEDURE — 96365 THER/PROPH/DIAG IV INF INIT: CPT

## 2021-03-15 PROCEDURE — G1004 CDSM NDSC: HCPCS

## 2021-03-15 PROCEDURE — 96367 TX/PROPH/DG ADDL SEQ IV INF: CPT

## 2021-03-15 PROCEDURE — 80053 COMPREHEN METABOLIC PANEL: CPT | Performed by: GENERAL PRACTICE

## 2021-03-15 PROCEDURE — 96361 HYDRATE IV INFUSION ADD-ON: CPT

## 2021-03-15 PROCEDURE — 99285 EMERGENCY DEPT VISIT HI MDM: CPT

## 2021-03-15 PROCEDURE — 99219 PR INITIAL OBSERVATION CARE/DAY 50 MINUTES: CPT | Performed by: NURSE PRACTITIONER

## 2021-03-15 PROCEDURE — 74176 CT ABD & PELVIS W/O CONTRAST: CPT

## 2021-03-15 PROCEDURE — 99285 EMERGENCY DEPT VISIT HI MDM: CPT | Performed by: GENERAL PRACTICE

## 2021-03-15 PROCEDURE — 83605 ASSAY OF LACTIC ACID: CPT | Performed by: NURSE PRACTITIONER

## 2021-03-15 RX ORDER — KETOROLAC TROMETHAMINE 30 MG/ML
30 INJECTION, SOLUTION INTRAMUSCULAR; INTRAVENOUS EVERY 6 HOURS PRN
Status: DISCONTINUED | OUTPATIENT
Start: 2021-03-17 | End: 2021-03-16 | Stop reason: HOSPADM

## 2021-03-15 RX ORDER — ONDANSETRON 2 MG/ML
4 INJECTION INTRAMUSCULAR; INTRAVENOUS EVERY 6 HOURS PRN
Status: DISCONTINUED | OUTPATIENT
Start: 2021-03-15 | End: 2021-03-16 | Stop reason: HOSPADM

## 2021-03-15 RX ORDER — ACETAMINOPHEN 325 MG/1
650 TABLET ORAL EVERY 6 HOURS PRN
Status: DISCONTINUED | OUTPATIENT
Start: 2021-03-15 | End: 2021-03-16 | Stop reason: HOSPADM

## 2021-03-15 RX ORDER — KETOROLAC TROMETHAMINE 30 MG/ML
15 INJECTION, SOLUTION INTRAMUSCULAR; INTRAVENOUS ONCE
Status: COMPLETED | OUTPATIENT
Start: 2021-03-15 | End: 2021-03-15

## 2021-03-15 RX ORDER — SILDENAFIL 100 MG/1
100 TABLET, FILM COATED ORAL DAILY PRN
COMMUNITY

## 2021-03-15 RX ORDER — MAGNESIUM HYDROXIDE/ALUMINUM HYDROXICE/SIMETHICONE 120; 1200; 1200 MG/30ML; MG/30ML; MG/30ML
30 SUSPENSION ORAL EVERY 6 HOURS PRN
Status: DISCONTINUED | OUTPATIENT
Start: 2021-03-15 | End: 2021-03-16 | Stop reason: HOSPADM

## 2021-03-15 RX ORDER — ONDANSETRON 2 MG/ML
4 INJECTION INTRAMUSCULAR; INTRAVENOUS ONCE
Status: COMPLETED | OUTPATIENT
Start: 2021-03-15 | End: 2021-03-15

## 2021-03-15 RX ORDER — CEFTRIAXONE 2 G/50ML
2000 INJECTION, SOLUTION INTRAVENOUS EVERY 24 HOURS
Status: DISCONTINUED | OUTPATIENT
Start: 2021-03-16 | End: 2021-03-16 | Stop reason: HOSPADM

## 2021-03-15 RX ORDER — MORPHINE SULFATE 4 MG/ML
4 INJECTION, SOLUTION INTRAMUSCULAR; INTRAVENOUS ONCE
Status: COMPLETED | OUTPATIENT
Start: 2021-03-15 | End: 2021-03-15

## 2021-03-15 RX ORDER — KETOROLAC TROMETHAMINE 30 MG/ML
15 INJECTION, SOLUTION INTRAMUSCULAR; INTRAVENOUS EVERY 6 HOURS PRN
Status: DISCONTINUED | OUTPATIENT
Start: 2021-03-15 | End: 2021-03-16 | Stop reason: HOSPADM

## 2021-03-15 RX ORDER — SODIUM CHLORIDE 9 MG/ML
100 INJECTION, SOLUTION INTRAVENOUS CONTINUOUS
Status: DISCONTINUED | OUTPATIENT
Start: 2021-03-15 | End: 2021-03-16 | Stop reason: HOSPADM

## 2021-03-15 RX ORDER — CEFTRIAXONE 1 G/50ML
1000 INJECTION, SOLUTION INTRAVENOUS ONCE
Status: COMPLETED | OUTPATIENT
Start: 2021-03-15 | End: 2021-03-15

## 2021-03-15 RX ORDER — TAMSULOSIN HYDROCHLORIDE 0.4 MG/1
0.4 CAPSULE ORAL
Status: DISCONTINUED | OUTPATIENT
Start: 2021-03-15 | End: 2021-03-16 | Stop reason: HOSPADM

## 2021-03-15 RX ADMIN — SODIUM CHLORIDE 1000 ML: 0.9 INJECTION, SOLUTION INTRAVENOUS at 14:57

## 2021-03-15 RX ADMIN — TAMSULOSIN HYDROCHLORIDE 0.4 MG: 0.4 CAPSULE ORAL at 21:54

## 2021-03-15 RX ADMIN — SODIUM CHLORIDE 1000 ML: 0.9 INJECTION, SOLUTION INTRAVENOUS at 16:57

## 2021-03-15 RX ADMIN — MORPHINE SULFATE 4 MG: 4 INJECTION INTRAVENOUS at 16:01

## 2021-03-15 RX ADMIN — LIDOCAINE HYDROCHLORIDE 167 MG: 10 INJECTION, SOLUTION EPIDURAL; INFILTRATION; INTRACAUDAL; PERINEURAL at 17:09

## 2021-03-15 RX ADMIN — KETOROLAC TROMETHAMINE 15 MG: 30 INJECTION, SOLUTION INTRAMUSCULAR at 22:00

## 2021-03-15 RX ADMIN — SODIUM CHLORIDE 100 ML/HR: 0.9 INJECTION, SOLUTION INTRAVENOUS at 21:54

## 2021-03-15 RX ADMIN — CEFTRIAXONE 1000 MG: 1 INJECTION, SOLUTION INTRAVENOUS at 18:47

## 2021-03-15 RX ADMIN — KETOROLAC TROMETHAMINE 15 MG: 30 INJECTION, SOLUTION INTRAMUSCULAR at 15:04

## 2021-03-15 RX ADMIN — ONDANSETRON 4 MG: 2 INJECTION INTRAMUSCULAR; INTRAVENOUS at 14:58

## 2021-03-15 NOTE — ED PROVIDER NOTES
History  Chief Complaint   Patient presents with    Flank Pain     L flank pain since this am here recently with stones  some nausea     Patient is a 78-year-old male with a past medical history of kidney stones who presents to the emergency room with right flank pain  Pain started around 10:00 a m  This morning  He describes sharp pain in his right flank that does not radiate to his abdomen or groin  No hematuria or dysuria  He states it feels similar to prior episodes of renal colic  Was last seen here 2 weeks ago at which time he was found to have a 9 mm stone in his left ureter that was causing left flank pain  Dr Iesha Dennison at that time performed a lithotripsy with cystoscopy and ureteroscopy with placement of a stent that was then removed 5 days later  His symptoms had resolved until recurrence on the opposite side this morning at 10:00 a m  No fevers or chills  He does report associated nausea but no vomiting  His last meal was at 10:00 a m  Unk Nat Prior to Admission Medications   Prescriptions Last Dose Informant Patient Reported? Taking? Tamsulosin HCl (FLOMAX PO) 3/14/2021 at Unknown time  Yes Yes   Sig: Take 0 4 mg by mouth daily   omeprazole (PriLOSEC) 40 MG capsule Not Taking at Unknown time  No No   Sig: Take 1 capsule (40 mg total) by mouth daily   Patient not taking: Reported on 3/15/2021      Facility-Administered Medications: None       Past Medical History:   Diagnosis Date    Anxiety     Depression     GERD (gastroesophageal reflux disease)        Past Surgical History:   Procedure Laterality Date    ADENOIDECTOMY      CHOLECYSTECTOMY      INGUINAL HERNIA REPAIR      IA CYSTO/URETERO W/LITHOTRIPSY &INDWELL STENT INSRT Left 2/28/2021    Procedure: CYSTOSCOPY URETEROSCOPY WITH LITHOTRIPSY HOLMIUM LASER, RETROGRADE PYELOGRAM AND INSERTION STENT URETERAL;  Surgeon: Nancy Leos MD;  Location: [address] [place] [address];  Service: Urology    TONSILLECTOMY         No family history on file    I have reviewed and agree with the history as documented  E-Cigarette/Vaping    E-Cigarette Use Never User      E-Cigarette/Vaping Substances     Social History     Tobacco Use    Smoking status: Never Smoker    Smokeless tobacco: Never Used   Substance Use Topics    Alcohol use: Never     Frequency: Never    Drug use: Never       Review of Systems   Constitutional: Negative for chills and fatigue  HENT: Negative for congestion and rhinorrhea  Eyes: Negative for redness and visual disturbance  Respiratory: Negative for cough and wheezing  Cardiovascular: Negative for chest pain and palpitations  Gastrointestinal: Positive for nausea  Negative for abdominal pain, constipation, diarrhea and vomiting  Endocrine: Negative for polydipsia and polyuria  Genitourinary: Positive for flank pain  Negative for dysuria and hematuria  Musculoskeletal: Negative for arthralgias and myalgias  Neurological: Negative for light-headedness and headaches  Hematological: Negative for adenopathy  Does not bruise/bleed easily  Psychiatric/Behavioral: Negative for dysphoric mood  The patient is not nervous/anxious  All other systems reviewed and are negative  Physical Exam  Physical Exam  Constitutional:       General: He is not in acute distress  Appearance: Normal appearance  He is not ill-appearing  HENT:      Head: Normocephalic and atraumatic  Mouth/Throat:      Mouth: Mucous membranes are moist       Pharynx: Oropharynx is clear  Eyes:      General: No scleral icterus  Conjunctiva/sclera: Conjunctivae normal    Neck:      Musculoskeletal: Normal range of motion and neck supple  Cardiovascular:      Rate and Rhythm: Normal rate and regular rhythm  Pulses: Normal pulses  Heart sounds: No murmur  No friction rub  No gallop  Pulmonary:      Effort: Pulmonary effort is normal  No respiratory distress  Breath sounds: Normal breath sounds   No wheezing, rhonchi or rales    Abdominal:      General: There is no distension  Palpations: Abdomen is soft  There is no mass  Tenderness: There is no abdominal tenderness  There is right CVA tenderness  There is no left CVA tenderness, guarding or rebound  Musculoskeletal: Normal range of motion  General: No swelling or tenderness  Skin:     General: Skin is warm and dry  Capillary Refill: Capillary refill takes less than 2 seconds  Neurological:      General: No focal deficit present  Mental Status: He is alert and oriented to person, place, and time  Mental status is at baseline     Psychiatric:         Mood and Affect: Mood normal          Behavior: Behavior normal          Vital Signs  ED Triage Vitals [03/15/21 1427]   Temperature Pulse Respirations Blood Pressure SpO2   99 5 °F (37 5 °C) (!) 107 20 134/94 97 %      Temp Source Heart Rate Source Patient Position - Orthostatic VS BP Location FiO2 (%)   Tympanic Monitor Sitting Right arm --      Pain Score       5           Vitals:    03/15/21 1815 03/15/21 1830 03/15/21 1845 03/15/21 1900   BP: 132/85 121/71 125/80 128/70   Pulse: 78 78 78 74   Patient Position - Orthostatic VS:             Visual Acuity      ED Medications  Medications   ondansetron (ZOFRAN) injection 4 mg (4 mg Intravenous Given 3/15/21 1458)   ketorolac (TORADOL) injection 15 mg (15 mg Intravenous Given 3/15/21 1504)   sodium chloride 0 9 % bolus 1,000 mL (0 mL Intravenous Stopped 3/15/21 1603)   morphine (PF) 4 mg/mL injection 4 mg (4 mg Intravenous Given 3/15/21 1601)   lidocaine (XYLOCAINE) 1 % 167 mg in dextrose 5 % 50 mL IVPB (0 mg/kg × 111 kg Intravenous Stopped 3/15/21 1739)   sodium chloride 0 9 % bolus 1,000 mL (1,000 mL Intravenous New Bag 3/15/21 1657)   cefTRIAXone (ROCEPHIN) IVPB (premix in dextrose) 1,000 mg 50 mL (1,000 mg Intravenous New Bag 3/15/21 1847)       Diagnostic Studies  Results Reviewed     Procedure Component Value Units Date/Time    UA w Reflex to Microscopic w Reflex to Culture [[id number]] Collected: 03/15/21 1659    Lab Status: Final result Specimen: Urine, Clean Catch Updated: 03/15/21 1708     Color, UA Yellow     Clarity, UA Clear     Specific Gravity, UA 1 025     pH, UA 6 0     Leukocytes, UA Negative     Nitrite, UA Negative     Protein, UA Negative mg/dl      Glucose, UA Negative mg/dl      Ketones, UA Negative mg/dl      Urobilinogen, UA 0 2 E U /dl      Bilirubin, UA Negative     Blood, UA Negative    CMP [[id number]]  (Abnormal) Collected: 03/15/21 1505    Lab Status: Final result Specimen: Blood from Arm, Right Updated: 03/15/21 1526     Sodium 135 mmol/L      Potassium 4 3 mmol/L      Chloride 102 mmol/L      CO2 26 mmol/L      ANION GAP 7 mmol/L      BUN 19 mg/dL      Creatinine 1 21 mg/dL      Glucose 100 mg/dL      Calcium 8 8 mg/dL      AST 26 U/L      ALT 43 U/L      Alkaline Phosphatase 69 U/L      Total Protein 7 0 g/dL      Albumin 3 6 g/dL      Total Bilirubin 0 40 mg/dL      eGFR 67 ml/min/1 73sq m     Narrative:      [place] guidelines for Chronic Kidney Disease (CKD):     Stage 1 with normal or high GFR (GFR > 90 mL/min/1 73 square meters)    Stage 2 Mild CKD (GFR = 60-89 mL/min/1 73 square meters)    Stage 3A Moderate CKD (GFR = 45-59 mL/min/1 73 square meters)    Stage 3B Moderate CKD (GFR = 30-44 mL/min/1 73 square meters)    Stage 4 Severe CKD (GFR = 15-29 mL/min/1 73 square meters)    Stage 5 End Stage CKD (GFR <15 mL/min/1 73 square meters)  Note: GFR calculation is accurate only with a steady state creatinine    CBC and differential [[id number]]  (Abnormal) Collected: 03/15/21 1505    Lab Status: Final result Specimen: Blood from Arm, Right Updated: 03/15/21 1511     WBC 17 63 Thousand/uL      RBC 5 29 Million/uL      Hemoglobin 15 7 g/dL      Hematocrit 47 0 %      MCV 89 fL      MCH 29 7 pg      MCHC 33 4 g/dL      RDW 11 8 %      MPV 8 9 fL      Platelets 111 Thousands/uL      nRBC 0 /100 WBCs Neutrophils Relative 78 %      Immat GRANS % 1 %      Lymphocytes Relative 12 %      Monocytes Relative 7 %      Eosinophils Relative 2 %      Basophils Relative 0 %      Neutrophils Absolute 13 78 Thousands/µL      Immature Grans Absolute 0 17 Thousand/uL      Lymphocytes Absolute 2 10 Thousands/µL      Monocytes Absolute 1 26 Thousand/µL      Eosinophils Absolute 0 26 Thousand/µL      Basophils Absolute 0 06 Thousands/µL                  CT abdomen pelvis wo contrast   Final Result by Alonso Rinne, MD (03/15 1723)      1  Distal left ureteral calculus and hydronephrosis have resolved  2   Bilateral nonobstructing renal calculi measuring up to 9 mm on the right and 3 mm on the left  3   Diffuse hepatic steatosis with focal fatty sparing  4   Stranding in the small bowel mesentery, mildly increased from 2013 though without pathologic lymphadenopathy and 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  Workstation performed: [id number]                    Procedures  Procedures         ED Course  ED Course as of Mar 15 1925   Mon Mar 15, 2021   1702 Spoke to Dr Chayito Pride  Plan to get CT and re-evaluate  1734 No stone visible on CT  Spoke to Dr Chayito Pride  He recommends IV antibiotics and observation overnight for pain control with repeat blood work in AM  He will be in to see the patient  1911 Dr Chayito Pride saw the patient  No change in recommendations  Will admit to hospitalists  1916 Spoke to Arnol regarding admission  Accepted to Dr Penny Joshi service                                                 MDM    Disposition  Final diagnoses:   Flank pain   Leukocytosis, unspecified type     Time reflects when diagnosis was documented in both MDM as applicable and the Disposition within this note     Time User Action Codes Description Comment    3/15/2021  7:23 PM Pool Hinson Add [R10 9] Flank pain     3/15/2021  7:24 PM Pool Hinson Add [D72 829] Leukocytosis, unspecified type       ED Disposition     ED Disposition Condition Date/Time Comment    Admit Stable Mon Mar 15, 2021  7:23 PM Case was discussed with Nicko Balderas and the patient's admission status was agreed to be Admission Status: observation status to the service of Dr Krupa Abreu   Follow-up Information    None         Patient's Medications   Discharge Prescriptions    No medications on file     No discharge procedures on file      PDMP Review     None          ED Provider  Electronically Signed by           José Miguel Howell MD  03/15/21 4622

## 2021-03-15 NOTE — CONSULTS
H&P Exam - Urology       Patient: Kedar Rowland   : 1965 Sex: male   MRN: [id number]     CSN: [id number]      History of Present Illness   HPI:  Kedar Rowland is a 63 y o  male who presented to the ER today with increasing right flank pain in fear of passing a known right kidney stones underwent spiral CT scan confirming stable 9 mm right renal stone as well as small stones in the right kidney  Patient well known to me admitted some 2 weeks ago undergoing cystoscopy left ureteroscopy laser lithotripsy stent placement for 9 mm left distal stone found on CT scan at that time to have multiple stones in the right kidney he was seen in the office last week undergoing cysto left stent removal started on tamsulosin 0 4 mg for voiding issues  He denies gross hematuria dysuria at this time but was noted in the ER to have low-grade temperature and leukocytosis        Review of Systems:   Constitutional:  Negative for activity change, fever, chills and diaphoresis  HENT: Negative for hearing loss and trouble swallowing  Eyes: Negative for itching and visual disturbance  Respiratory: Negative for chest tightness and shortness of breath  Cardiovascular: Negative for chest pain, edema  Gastrointestinal: Negative for abdominal distention, na abdominal pain, constipation, diarrhea, Nausea and vomiting  Genitourinary: Negative for decreased urine volume, difficulty urinating, dysuria, enuresis, frequency, hematuria and urgency  Musculoskeletal: Negative for gait problem and myalgias  Neurological: Negative for dizziness and headaches  Hematological: Does not bruise/bleed easily     [facility] just    Historical Information   Past Medical History:   Diagnosis Date    Anxiety     Depression     GERD (gastroesophageal reflux disease)      Past Surgical History:   Procedure Laterality Date    ADENOIDECTOMY      CHOLECYSTECTOMY      INGUINAL HERNIA REPAIR      MI CYSTO/URETERO W/LITHOTRIPSY &INDWELL STENT INSRT Left 2/28/2021    Procedure: CYSTOSCOPY URETEROSCOPY WITH LITHOTRIPSY HOLMIUM LASER, RETROGRADE PYELOGRAM AND INSERTION STENT URETERAL;  Surgeon: Jennifer Cerda MD;  Location: [facility];  Service: Urology    TONSILLECTOMY       Social History   Social History     Substance and Sexual Activity   Alcohol Use Never    Frequency: Never     Social History     Substance and Sexual Activity   Drug Use Never     Social History     Tobacco Use   Smoking Status Never Smoker   Smokeless Tobacco Never Used     Family History: No family history on file  Meds/Allergies   (Not in a hospital admission)    No Known Allergies    Objective   Vitals: /80   Pulse 78   Temp 99 5 °F (37 5 °C) (Tympanic)   Resp 16   SpO2 99%     Physical Exam:  General Alert awake   Normocephalic atraumatic PERRLA  Lungs clear bilaterally  Cardiac normal S1 normal S2  Abdomen soft, flank pain mild right mid back pinpoint discomfort  Rectal exam not done  Extremities no edema    I/O last 24 hours:   In: 1050 [IV Piggyback:1050]  Out: -     Invasive Devices     Peripheral Intravenous Line            Peripheral IV 03/15/21 Right Hand less than 1 day          Drain            Ureteral Drain/Stent Left ureter 6 Fr  15 days                    Lab Results: CBC:   Lab Results   Component Value Date    WBC 17 63 (H) 03/15/2021    HGB 15 7 03/15/2021    HCT 47 0 03/15/2021    MCV 89 03/15/2021     03/15/2021    MCH 29 7 03/15/2021    MCHC 33 4 03/15/2021    RDW 11 8 03/15/2021    MPV 8 9 03/15/2021    NRBC 0 03/15/2021     CMP:   Lab Results   Component Value Date     03/15/2021    CO2 26 03/15/2021    CO2 28 11/19/2018    BUN 19 03/15/2021    CREATININE 1 21 03/15/2021    CALCIUM 8 8 03/15/2021    AST 26 03/15/2021    ALT 43 03/15/2021    ALKPHOS 69 03/15/2021    EGFR 67 03/15/2021     Urinalysis:   Lab Results   Component Value Date    COLORU Yellow 03/15/2021    CLARITYU Clear 03/15/2021    SPECGRAV 1 025 03/15/2021    PHUR 6 0 03/15/2021    LEUKOCYTESUR Negative 03/15/2021    NITRITE Negative 03/15/2021    GLUCOSEU Negative 03/15/2021    KETONESU Negative 03/15/2021    BILIRUBINUR Negative 03/15/2021    BLOODU Negative 03/15/2021     Urine Culture: No results found for: URINECX  PSA: No results found for: PSA    CT scan stable 9 mm/multiple stones right kidney    Assessment/ Plan:  Low-grade temp  Leukocytosis  Possible UTI involving pyelonephritis  Admit observation/medical service  Start Zosyn  Urine cultures  Continue tamsulosin 0 4 mg voiding issues        Ilan Hou MD

## 2021-03-16 VITALS
WEIGHT: 255.51 LBS | RESPIRATION RATE: 18 BRPM | TEMPERATURE: 98.4 F | SYSTOLIC BLOOD PRESSURE: 152 MMHG | DIASTOLIC BLOOD PRESSURE: 90 MMHG | HEART RATE: 79 BPM | BODY MASS INDEX: 40.1 KG/M2 | HEIGHT: 67 IN | OXYGEN SATURATION: 97 %

## 2021-03-16 LAB
ANION GAP SERPL CALCULATED.3IONS-SCNC: 7 MMOL/L (ref 4–13)
BASOPHILS # BLD AUTO: 0.05 THOUSANDS/ΜL (ref 0–0.1)
BASOPHILS NFR BLD AUTO: 1 % (ref 0–1)
BUN SERPL-MCNC: 14 MG/DL (ref 5–25)
CALCIUM SERPL-MCNC: 7.8 MG/DL (ref 8.3–10.1)
CHLORIDE SERPL-SCNC: 103 MMOL/L (ref 100–108)
CHOLEST SERPL-MCNC: 195 MG/DL (ref 50–200)
CO2 SERPL-SCNC: 25 MMOL/L (ref 21–32)
CREAT SERPL-MCNC: 0.97 MG/DL (ref 0.6–1.3)
EOSINOPHIL # BLD AUTO: 0.31 THOUSAND/ΜL (ref 0–0.61)
EOSINOPHIL NFR BLD AUTO: 4 % (ref 0–6)
ERYTHROCYTE [DISTWIDTH] IN BLOOD BY AUTOMATED COUNT: 11.9 % (ref 11.6–15.1)
GFR SERPL CREATININE-BSD FRML MDRD: 87 ML/MIN/1.73SQ M
GLUCOSE P FAST SERPL-MCNC: 101 MG/DL (ref 65–99)
GLUCOSE SERPL-MCNC: 101 MG/DL (ref 65–140)
HCT VFR BLD AUTO: 42 % (ref 36.5–49.3)
HDLC SERPL-MCNC: 30 MG/DL
HGB BLD-MCNC: 13.5 G/DL (ref 12–17)
IMM GRANULOCYTES # BLD AUTO: 0.1 THOUSAND/UL (ref 0–0.2)
IMM GRANULOCYTES NFR BLD AUTO: 1 % (ref 0–2)
LDLC SERPL CALC-MCNC: 119 MG/DL (ref 0–100)
LYMPHOCYTES # BLD AUTO: 2.87 THOUSANDS/ΜL (ref 0.6–4.47)
LYMPHOCYTES NFR BLD AUTO: 38 % (ref 14–44)
MAGNESIUM SERPL-MCNC: 1.7 MG/DL (ref 1.6–2.6)
MCH RBC QN AUTO: 29.3 PG (ref 26.8–34.3)
MCHC RBC AUTO-ENTMCNC: 32.1 G/DL (ref 31.4–37.4)
MCV RBC AUTO: 91 FL (ref 82–98)
MONOCYTES # BLD AUTO: 0.66 THOUSAND/ΜL (ref 0.17–1.22)
MONOCYTES NFR BLD AUTO: 9 % (ref 4–12)
NEUTROPHILS # BLD AUTO: 3.62 THOUSANDS/ΜL (ref 1.85–7.62)
NEUTS SEG NFR BLD AUTO: 47 % (ref 43–75)
NRBC BLD AUTO-RTO: 0 /100 WBCS
PLATELET # BLD AUTO: 215 THOUSANDS/UL (ref 149–390)
PMV BLD AUTO: 8.9 FL (ref 8.9–12.7)
POTASSIUM SERPL-SCNC: 3.8 MMOL/L (ref 3.5–5.3)
PROCALCITONIN SERPL-MCNC: 0.14 NG/ML
RBC # BLD AUTO: 4.6 MILLION/UL (ref 3.88–5.62)
SODIUM SERPL-SCNC: 135 MMOL/L (ref 136–145)
TRIGL SERPL-MCNC: 228 MG/DL
WBC # BLD AUTO: 7.61 THOUSAND/UL (ref 4.31–10.16)

## 2021-03-16 PROCEDURE — 85025 COMPLETE CBC W/AUTO DIFF WBC: CPT | Performed by: NURSE PRACTITIONER

## 2021-03-16 PROCEDURE — 80061 LIPID PANEL: CPT | Performed by: NURSE PRACTITIONER

## 2021-03-16 PROCEDURE — 83735 ASSAY OF MAGNESIUM: CPT | Performed by: NURSE PRACTITIONER

## 2021-03-16 PROCEDURE — 80048 BASIC METABOLIC PNL TOTAL CA: CPT | Performed by: NURSE PRACTITIONER

## 2021-03-16 PROCEDURE — 99217 PR OBSERVATION CARE DISCHARGE MANAGEMENT: CPT | Performed by: INTERNAL MEDICINE

## 2021-03-16 PROCEDURE — 87086 URINE CULTURE/COLONY COUNT: CPT | Performed by: INTERNAL MEDICINE

## 2021-03-16 RX ORDER — CEPHALEXIN 500 MG/1
500 CAPSULE ORAL 4 TIMES DAILY
Qty: 36 CAPSULE | Refills: 0 | Status: SHIPPED | OUTPATIENT
Start: 2021-03-16 | End: 2021-03-25

## 2021-03-16 RX ORDER — CEPHALEXIN 500 MG/1
500 CAPSULE ORAL 4 TIMES DAILY
Qty: 36 CAPSULE | Refills: 0 | Status: SHIPPED | OUTPATIENT
Start: 2021-03-16 | End: 2021-03-16 | Stop reason: SDUPTHER

## 2021-03-16 RX ADMIN — SODIUM CHLORIDE 100 ML/HR: 0.9 INJECTION, SOLUTION INTRAVENOUS at 06:38

## 2021-03-16 RX ADMIN — ENOXAPARIN SODIUM 40 MG: 40 INJECTION SUBCUTANEOUS at 13:01

## 2021-03-16 NOTE — PLAN OF CARE
Problem: PAIN - ADULT  Goal: Verbalizes/displays adequate comfort level or baseline comfort level  Description: Interventions:  - Encourage patient to monitor pain and request assistance  - Assess pain using appropriate pain scale  - Administer analgesics based on type and severity of pain and evaluate response  - Implement non-pharmacological measures as appropriate and evaluate response  - Consider cultural and social influences on pain and pain management  - Notify physician/advanced practitioner if interventions unsuccessful or patient reports new pain  Outcome: Progressing  Note: 0-10 pain scale       Problem: GENITOURINARY - ADULT  Goal: Maintains or returns to baseline urinary function  Description: INTERVENTIONS:  - Assess urinary function  - Encourage oral fluids to ensure adequate hydration if ordered  - Administer IV fluids as ordered to ensure adequate hydration  - Administer ordered medications as needed  - Offer frequent toileting  - Follow urinary retention protocol if ordered  Outcome: Progressing  Note: Strain all urine

## 2021-03-16 NOTE — DISCHARGE SUMMARY
[facility] 44  Discharge- Belinda Jackson 1965, 63 y o  male MRN: [id number]  Unit/Bed#: [address] [place] [address] Encounter: [id number]  Primary Care Provider: Mariely Preciado MD   Date and time admitted to hospital: 3/15/2021  2:38 PM    * Nephrolithiasis  Assessment & Plan  · Patient presented with right flank/right kidney pain started yesterday morning  · Recent history of left flank pain secondary to obstructing ureteral calculus about 2 weeks ago  Patient underwent cystoscopy, ureteroscopy with lithotripsy, pyelogram and left ureteral stent placement  Status post stent removal last week  · CT abdomen pelvis  - The distal left ureteral calculus is no longer visualized  There is a 9 mm nonobstructing calculus in the right kidney  There are small calculi in the left kidney measuring up to 3 mm  · UA no evidence of infection  · WBC 17 63, low-grade fever 99 5 in ED  · Patient was seen by his urologist, Dr Maureen Chapin ED  Recommend antibiotic for possible UTI involving pyelonephritis  · Patient received IV Rocephin in ED, will continue  Patient's lactic acid level was normal and procalcitonin level was 0 14  Patient has no further fever or pain or leukocytosis  Urine was sent for culture  Patient will be discharged on Keflex 1 milligram p o  4 times a day with outpatient follow-up with Urology      SIRS (systemic inflammatory response syndrome) (HCC)  Assessment & Plan  Doubtful sepsis  POA, as evidenced by leukocytosis, tachycardia, with no clinic evidence of infection  Though suspect urinary tract infection    Patient received IV Rocephin in ED which was continued  Lactic acid level was normal and procalcitonin level was 0 14  Patient received IV hydration  Patient remained afebrile with normalized white count this morning  Urine cultures were sent  Patient be discharged on Keflex 5 milligram p o  Q 6 hours further 9 days to finish a 10 day course and urine cultures will be followed by Urology    Mesenteric panniculitis 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  No indication for antibiotics       Hepatic steatosis  Assessment & Plan  CT showed - Diffuse hepatic steatosis with focal fatty sparing  Lipid panel showed LDL level of 119  Possible AVILEZ  Outpatient follow-up with GI/primary    Gastroesophageal reflux disease  Assessment & Plan  Continue PPI      Generalized anxiety disorder  Assessment & Plan  Not on medication at home  Discharging Physician / Practitioner: Sean Alford MD  PCP: Katerin Lugo MD  Admission Date:   Admission Orders (From admission, onward)     Ordered        03/15/21 1925  Place in Observation  Once                   Discharge Date: 03/16/21    Resolved Problems  Date Reviewed: 3/16/2021    None          Consultations During Hospital Stay:  · Urology     Outpatient Tests Requested:  · Follow-up with Urology    Complications:  None    Reason for Admission:  Flank pain    Hospital Course:     Sanaz French is a 63 y o  male patient with past medical history of kidney stones, depression, anxiety, GERD who originally presented to the hospital on 3/15/2021 due to right flank pain which started on the morning of admission  CT scan in the ED showed 9 millimeter nonobstructing calculus in the right kidney and small calculus in the left kidney  Patient did have low-grade fever with elevated white count  Patient is admitted hospital and was started on IV Rocephin for suspected pyelonephritis/UTI  UA was unremarkable  Patient continued to remain asymptomatic with resolution of fever and white count  Discussed in detail with Urology  No plan for any cystoscopy or intervention at the current time  Urine cultures were added on to the labs  Patient to be discharged home on p o   Antibiotics with outpatient follow-up with Urology regarding the urine cultures  Please see above list of diagnoses and related plan for additional information  Condition at Discharge: stable     Discharge Day Visit / Exam:     Subjective:  Patient did have 1 episode of bloody urine last night which has since resolved  Patient denies any further flank pain, abdominal pain, nausea or vomiting  Vitals: Blood Pressure: 152/90 (03/16/21 0801)  Pulse: 79 (03/16/21 0801)  Temperature: 98 4 °F (36 9 °C) (03/16/21 0801)  Temp Source: Oral (03/16/21 0801)  Respirations: 18 (03/16/21 0801)  Height: 5' 7" (170 2 cm) (03/15/21 2135)  Weight - Scale: 116 kg (255 lb 8 2 oz) (03/15/21 2135)  SpO2: 97 % (03/16/21 0830)  Exam:   Physical Exam  Constitutional:       Appearance: Normal appearance  HENT:      Head: Normocephalic and atraumatic  Eyes:      Extraocular Movements: Extraocular movements intact  Pupils: Pupils are equal, round, and reactive to light  Neck:      Musculoskeletal: Normal range of motion and neck supple  Cardiovascular:      Rate and Rhythm: Normal rate and regular rhythm  Heart sounds: No murmur  No gallop  Pulmonary:      Effort: Pulmonary effort is normal       Breath sounds: Normal breath sounds  Abdominal:      General: Bowel sounds are normal       Palpations: Abdomen is soft  Tenderness: There is no abdominal tenderness  Musculoskeletal: Normal range of motion  General: No swelling or deformity  Skin:     General: Skin is warm and dry  Neurological:      General: No focal deficit present  Mental Status: He is alert  Discharge instructions/Information to patient and family:   See after visit summary for information provided to patient and family  Provisions for Follow-Up Care:  See after visit summary for information related to follow-up care and any pertinent home health orders        Disposition:     Home    Planned Readmission: No     Discharge Statement:  I spent 25 minutes discharging the patient  This time was spent on the day of discharge  I had direct contact with the patient on the day of discharge  Greater than 50% of the total time was spent examining patient, answering all patient questions, arranging and discussing plan of care with patient as well as directly providing post-discharge instructions  Additional time then spent on discharge activities  Discharge Medications:  See after visit summary for reconciled discharge medications provided to patient and family        ** Please Note: This note has been constructed using a voice recognition system **

## 2021-03-16 NOTE — UTILIZATION REVIEW
Initial Clinical Review    Admission: Date/Time/Statement:   Admission Orders (From admission, onward)     Ordered        03/15/21 1925  Place in Observation  Once                   Orders Placed This Encounter   Procedures    Place in Observation     Standing Status:   Standing     Number of Occurrences:   1     Order Specific Question:   Level of Care     Answer:   Med Surg [16]     ED Arrival Information     Expected Arrival Acuity Means of Arrival Escorted By Service Admission Type    - 3/15/2021 14:09 Urgent Walk-In Family Member General Medicine Urgent    Arrival Complaint    Flank Pain        Chief Complaint   Patient presents with    Flank Pain     L flank pain since this am here recently with stones  some nausea     Assessment/Plan:   58-year-old male with a past medical history of kidney stones who presents to the emergency room with right flank pain  Pain started around 10:00 a m  This morning  He describes sharp pain in his right flank that does not radiate to his abdomen or groin  No hematuria or dysuria  He states it feels similar to prior episodes of renal colic  Was last seen here 2 weeks ago at which time he was found to have a 9 mm stone in his left ureter that was causing left flank pain  Dr Emery Powell at that time performed a lithotripsy with cystoscopy and ureteroscopy with placement of a stent that was then removed 5 days later  His symptoms had resolved until recurrence on the opposite side this morning at 10:00 a m  No fevers or chills  He does report associated nausea but no vomiting  His last meal was at 10:00 a m  Nephrolithiasis  Assessment & Plan  · Patient presented with right flank/right kidney pain started around 10am today  · Recent history of left flank pain secondary to obstructing ureteral calculus about 2 weeks ago  Patient underwent cystoscopy, ureteroscopy with lithotripsy, pyelogram and left ureteral stent placement  Status post stent removal last week    · CT abdomen pelvis today showed - The distal left ureteral calculus is no longer visualized   There is a 9 mm nonobstructing calculus in the right kidney   There are small calculi in the left kidney measuring up to 3 mm  · UA no evidence of infection  · WBC 17 63, low-grade fever 99 5 in ED  · Patient was seen by his urologist, Dr Manny Jose ED  Recommend antibiotic for possible UTI involving pyelonephritis  · Patient received IV Rocephin in ED, will continue  · Check lactic acid, procalcitonin  · IV hydration  · Pain control  · NPO after midnight  · Repeat CBC with diff in a m              SIRS (systemic inflammatory response syndrome) (HCC)  Assessment & Plan  Sirs versus sepsis  POA, as evidenced by leukocytosis, tachycardia, with no clinic evidence of infection  Though suspect urinary tract infection  Patient received IV Rocephin in ED  Will continue  Check lactic acid, procalcitonin  IV hydration  Repeat CBC with diff, procalcitonin in a m  Sergio Given     Per urology:  CT scan stable 9 mm/multiple stones right kidney  Assessment/ Plan:  Low-grade temp  Leukocytosis  Possible UTI involving pyelonephritis  Admit observation/medical service  Start Zosyn  Urine cultures  Continue tamsulosin 0 4 mg voiding issues    ED Triage Vitals [03/15/21 1427]   Temperature Pulse Respirations Blood Pressure SpO2   99 5 °F (37 5 °C) (!) 107 20 134/94 97 %      Temp Source Heart Rate Source Patient Position - Orthostatic VS BP Location FiO2 (%)   Tympanic Monitor Sitting Right arm --      Pain Score       5          Wt Readings from Last 1 Encounters:   03/15/21 116 kg (255 lb 8 2 oz)     Additional Vital Signs:   Date/Time  Temp  Pulse  Resp  BP  MAP (mmHg)  SpO2  O2 Device  Patient Position - Orthostatic VS   03/16/21 0801  98 4 °F (36 9 °C)  79  18  152/90  115  --  --  Sitting   03/16/21 0741  97 9 °F (36 6 °C)  --  --  --  --  --  --  --   03/16/21 0351  97 9 °F (36 6 °C)  77  20  143/75  --  97 %  None (Room air)  Lying 03/15/21 2312  98 3 °F (36 8 °C)  82  20  138/84  --  97 %  None (Room air)  Lying   03/15/21 2044  97 9 °F (36 6 °C)  79  20  131/71  95  --  --  Lying   03/15/21 1930  --  --  --  139/90  109  --  --  --   03/15/21 1915  --  78  18  122/67  87  99 %  --  --     Pertinent Labs/Diagnostic Test Results:   Results from last 7 days   Lab Units 03/16/21  0441 03/15/21  1505   WBC Thousand/uL 7 61 17 63*   HEMOGLOBIN g/dL 13 5 15 7   HEMATOCRIT % 42 0 47 0   PLATELETS Thousands/uL 215 259   NEUTROS ABS Thousands/µL 3 62 13 78*     Results from last 7 days   Lab Units 03/16/21  0441 03/15/21  1505   SODIUM mmol/L 135* 135*   POTASSIUM mmol/L 3 8 4 3   CHLORIDE mmol/L 103 102   CO2 mmol/L 25 26   ANION GAP mmol/L 7 7   BUN mg/dL 14 19   CREATININE mg/dL 0 97 1 21   EGFR ml/min/1 73sq m 87 67   CALCIUM mg/dL 7 8* 8 8   MAGNESIUM mg/dL 1 7  --      Results from last 7 days   Lab Units 03/15/21  1505   AST U/L 26   ALT U/L 43   ALK PHOS U/L 69   TOTAL PROTEIN g/dL 7 0   ALBUMIN g/dL 3 6   TOTAL BILIRUBIN mg/dL 0 40     Results from last 7 days   Lab Units 03/16/21  0441 03/15/21  1505   GLUCOSE RANDOM mg/dL 101 100     Results from last 7 days   Lab Units 03/15/21  2100   LACTIC ACID mmol/L 1 0     Results from last 7 days   Lab Units 03/15/21  1659   CLARITY UA  Clear   COLOR UA  Yellow   SPEC GRAV UA  1 025   PH UA  6 0   GLUCOSE UA mg/dl Negative   KETONES UA mg/dl Negative   BLOOD UA  Negative   PROTEIN UA mg/dl Negative   NITRITE UA  Negative   BILIRUBIN UA  Negative   UROBILINOGEN UA E U /dl 0 2   LEUKOCYTES UA  Negative     3/15  Ct a/p=  1  Distal left ureteral calculus and hydronephrosis have resolved  2   Bilateral nonobstructing renal calculi measuring up to 9 mm on the right and 3 mm on the left  3   Diffuse hepatic steatosis with focal fatty sparing    4   Stranding in the small bowel mesentery, mildly increased from 2013 though without pathologic lymphadenopathy and 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    ED Treatment:   Medication Administration from 03/15/2021 1408 to 03/15/2021 2033       Date/Time Order Dose Route Action     03/15/2021 1458 ondansetron (ZOFRAN) injection 4 mg 4 mg Intravenous Given     03/15/2021 1504 ketorolac (TORADOL) injection 15 mg 15 mg Intravenous Given     03/15/2021 1457 sodium chloride 0 9 % bolus 1,000 mL 1,000 mL Intravenous New Bag     03/15/2021 1601 morphine (PF) 4 mg/mL injection 4 mg 4 mg Intravenous Given     03/15/2021 1709 lidocaine (XYLOCAINE) 1 % 167 mg in dextrose 5 % 50 mL IVPB 167 mg Intravenous New Bag     03/15/2021 1657 sodium chloride 0 9 % bolus 1,000 mL 1,000 mL Intravenous New Bag     03/15/2021 1847 cefTRIAXone (ROCEPHIN) IVPB (premix in dextrose) 1,000 mg 50 mL 1,000 mg Intravenous New Bag        Past Medical History:   Diagnosis Date    Anxiety     Depression     GERD (gastroesophageal reflux disease)     Kidney stones      Present on Admission:   Nephrolithiasis   Gastroesophageal reflux disease   Generalized anxiety disorder    Admitting Diagnosis: Flank pain [R10 9]  Leukocytosis, unspecified type [D72 829]  Age/Sex: 63 y o  male  Admission Orders:  Consult urology    Scheduled Medications:  cefTRIAXone, 2,000 mg, Intravenous, Q24H  enoxaparin, 40 mg, Subcutaneous, Daily  tamsulosin, 0 4 mg, Oral, Daily With Dinner    Continuous IV Infusions:  sodium chloride, 100 mL/hr, Intravenous, Continuous    PRN Meds:  acetaminophen, 650 mg, Oral, Q6H PRN  aluminum-magnesium hydroxide-simethicone, 30 mL, Oral, Q6H PRN  ketorolac, 15 mg, Intravenous, Q6H PRN    Followed by  Alvenia Sauce ON 3/17/2021] ketorolac, 30 mg, Intravenous, Q6H PRN  ondansetron, 4 mg, Intravenous, Q6H PRN    Network Utilization Review Department  ATTENTION: Please call with any questions or concerns to [phone] and carefully listen to the prompts so that you are directed to the right person   All voicemails are confidential   Meche Sauer all requests for admission clinical reviews, approved or denied determinations and any other requests to dedicated fax number below belonging to the campus where the patient is receiving treatment   List of dedicated fax numbers for the Facilities:  [address] [place] [address] DENIALS (Administrative/Medical Necessity) [phone]   [address] (Maternity/NICU/Pediatrics) [phone]   [address] [place] [address] Dr Rj Alexandra 0970 (  [facility]ad [facility]) [address] [place] [address] Ronen Knutson 1481 [address] [place] [address] [phone]

## 2021-03-16 NOTE — ASSESSMENT & PLAN NOTE
Doubtful sepsis  POA, as evidenced by leukocytosis, tachycardia, with no clinic evidence of infection  Though suspect urinary tract infection    Patient received IV Rocephin in ED which was continued  Lactic acid level was normal and procalcitonin level was 0 14  Patient received IV hydration  Patient remained afebrile with normalized white count this morning  Urine cultures were sent  Patient be discharged on Keflex 5 milligram p o  Q 6 hours further 9 days to finish a 10 day course and urine cultures will be followed by Urology

## 2021-03-16 NOTE — ASSESSMENT & PLAN NOTE
· Patient presented with right flank/right kidney pain started around 10am today  · Recent history of left flank pain secondary to obstructing ureteral calculus about 2 weeks ago  Patient underwent cystoscopy, ureteroscopy with lithotripsy, pyelogram and left ureteral stent placement  Status post stent removal last week  · CT abdomen pelvis today showed - The distal left ureteral calculus is no longer visualized  There is a 9 mm nonobstructing calculus in the right kidney  There are small calculi in the left kidney measuring up to 3 mm  · UA no evidence of infection  · WBC 17 63, low-grade fever 99 5 in ED  · Patient was seen by his urologist, Dr David Rodriguez ED  Recommend antibiotic for possible UTI involving pyelonephritis  · Patient received IV Rocephin in ED, will continue  · Check lactic acid, procalcitonin  · IV hydration  · Pain control  · NPO after midnight  · Repeat CBC with diff in cassandra Friedman

## 2021-03-16 NOTE — ASSESSMENT & PLAN NOTE
CT showed - Diffuse hepatic steatosis with focal fatty sparing  Lipid panel showed LDL level of 119  Possible AVILEZ  Outpatient follow-up with GI/primary

## 2021-03-16 NOTE — NURSING NOTE
Patients IV was removed and he dressed himself independently  He gathered his belongings with the help of his wife  He was given thorough discharge instructions on medications and follow up visits, and escorted to the [facility] by the nurse and his wife via wheelchair

## 2021-03-16 NOTE — H&P
[facility] 44  H&P- Gabe Eli 1965, 63 y o  male MRN: [id number]  Unit/Bed#: ED 08 Encounter: [id number]  Primary Care Provider: Criss Reed MD   Date and time admitted to hospital: 3/15/2021  2:38 PM    * Nephrolithiasis  Assessment & Plan  · Patient presented with right flank/right kidney pain started around 10am today  · Recent history of left flank pain secondary to obstructing ureteral calculus about 2 weeks ago  Patient underwent cystoscopy, ureteroscopy with lithotripsy, pyelogram and left ureteral stent placement  Status post stent removal last week  · CT abdomen pelvis today showed - The distal left ureteral calculus is no longer visualized  There is a 9 mm nonobstructing calculus in the right kidney  There are small calculi in the left kidney measuring up to 3 mm  · UA no evidence of infection  · WBC 17 63, low-grade fever 99 5 in ED  · Patient was seen by his urologist, Dr Manny Jose ED  Recommend antibiotic for possible UTI involving pyelonephritis  · Patient received IV Rocephin in ED, will continue  · Check lactic acid, procalcitonin  · IV hydration  · Pain control  · NPO after midnight  · Repeat CBC with diff in a m  SIRS (systemic inflammatory response syndrome) (HCC)  Assessment & Plan  Sirs versus sepsis  POA, as evidenced by leukocytosis, tachycardia, with no clinic evidence of infection  Though suspect urinary tract infection  Patient received IV Rocephin in ED  Will continue  Check lactic acid, procalcitonin  IV hydration  Repeat CBC with diff, procalcitonin in a m      Hepatic steatosis  Assessment & Plan  CT showed - Diffuse hepatic steatosis with focal fatty sparing  Check lipid panel  Discussed with patient    Mesenteric panniculitis 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  Discussed with attending, no indication for antibiotic        Obesity (BMI 35 0-39 9 without comorbidity)  Assessment & Plan  Diet and lifestyle modification    Gastroesophageal reflux disease  Assessment & Plan  Was on PPI in the past   Will order Ben p r virgie  Generalized anxiety disorder  Assessment & Plan  Not on medication at home  Monitor        VTE Prophylaxis: Enoxaparin (Lovenox)  / reason for no mechanical VTE prophylaxis On Lovenox   Code Status:  Full code  POLST: POLST form is not discussed and not completed at this time  Anticipated Length of Stay:  Patient will be admitted on an Observation basis with an anticipated length of stay of  <2 midnights  Justification for Hospital Stay:  Nephrolithiasis with intractable pain , sirs versus sepsis    Total Time for Visit, including Counseling / Coordination of Care: 30 minutes  Greater than 50% of this total time spent on direct patient counseling and coordination of care  Chief Complaint:   Right flank pain started around 10:00 a m  This morning  History of Present Illness:    Michelle Stroud is a 63 y o  male with recent left obstructing kidney stone, depression with anxiety, GERD who presents with right flank pain started around 10:00 a m  This morning while he was working  Patient states pain is localized to right-side back, sharp in nature, no aggravating or relieving factors, associated with nausea, no vomiting  Patient took Tylenol at home for pain  Patient reports ate poorly today at home after pain started  Patient denies dysuria, hematuria, urgency or frequency  Denies fever, chills, chest pain, headache, dizziness, SOB, abdominal pain  Of note, patient had left obstructing kidney stone about 2 weeks ago, had cystoscopy with left ureteral stent placement and patient is status post stent removal last week  Review of Systems:    Review of Systems   Constitutional: Positive for appetite change  Gastrointestinal: Positive for nausea  Genitourinary: Positive for flank pain  All other systems reviewed and are negative  Past Medical and Surgical History:     Past Medical History:   Diagnosis Date    Anxiety     Depression     GERD (gastroesophageal reflux disease)        Past Surgical History:   Procedure Laterality Date    ADENOIDECTOMY      CHOLECYSTECTOMY      INGUINAL HERNIA REPAIR      CO CYSTO/URETERO W/LITHOTRIPSY &INDWELL STENT INSRT Left 2/28/2021    Procedure: CYSTOSCOPY URETEROSCOPY WITH LITHOTRIPSY HOLMIUM LASER, RETROGRADE PYELOGRAM AND INSERTION STENT URETERAL;  Surgeon: Ok Gaona MD;  Location: [facility];  Service: Urology    TONSILLECTOMY         Meds/Allergies:    Prior to Admission medications    Medication Sig Start Date End Date Taking? Authorizing Provider   Tamsulosin HCl (FLOMAX PO) Take 0 4 mg by mouth daily   Yes Historical Provider, MD   omeprazole (PriLOSEC) 40 MG capsule Take 1 capsule (40 mg total) by mouth daily  Patient not taking: Reported on 3/15/2021 9/30/19   Jeanette Hough MD     I have reviewed home medications with patient personally  Allergies: No Known Allergies    Social History:     Marital Status:    Occupation:  [employer]  Patient Pre-hospital Living Situation:  Lives with wife  Patient Pre-hospital Level of Mobility:  Independent  Patient Pre-hospital Diet Restrictions:  Regular diet  Substance Use History:   Social History     Substance and Sexual Activity   Alcohol Use Never    Frequency: Never     Social History     Tobacco Use   Smoking Status Never Smoker   Smokeless Tobacco Never Used     Social History     Substance and Sexual Activity   Drug Use Never       Family History:    No family history on file      Physical Exam:     Vitals:   Blood Pressure: 139/90 (03/15/21 1930)  Pulse: 78 (03/15/21 1915)  Temperature: 99 5 °F (37 5 °C) (03/15/21 1427)  Temp Source: Tympanic (03/15/21 1427)  Respirations: 18 (03/15/21 1915)  SpO2: 99 % (03/15/21 1915)    Physical Exam  Vitals signs and nursing note reviewed  Constitutional:       Appearance: He is well-developed  He is obese  HENT:      Head: Normocephalic and atraumatic  Neck:      Musculoskeletal: Neck supple  Thyroid: No thyromegaly  Vascular: No JVD  Trachea: No tracheal deviation  Cardiovascular:      Rate and Rhythm: Normal rate and regular rhythm  Heart sounds: Normal heart sounds  Pulmonary:      Effort: Pulmonary effort is normal  No respiratory distress  Breath sounds: Normal breath sounds  No wheezing or rales  Abdominal:      General: Bowel sounds are normal  There is no distension  Palpations: Abdomen is soft  Tenderness: There is no abdominal tenderness  There is no guarding  Genitourinary:     Comments: right CVA nontender  Musculoskeletal: Normal range of motion  Skin:     General: Skin is warm and dry  Neurological:      General: No focal deficit present  Mental Status: He is alert and oriented to person, place, and time  Psychiatric:         Mood and Affect: Mood normal          Judgment: Judgment normal          Additional Data:     Lab Results: I have personally reviewed pertinent reports  Results from last 7 days   Lab Units 03/15/21  1505   WBC Thousand/uL 17 63*   HEMOGLOBIN g/dL 15 7   HEMATOCRIT % 47 0   PLATELETS Thousands/uL 259   NEUTROS PCT % 78*   LYMPHS PCT % 12*   MONOS PCT % 7   EOS PCT % 2     Results from last 7 days   Lab Units 03/15/21  1505   POTASSIUM mmol/L 4 3   CHLORIDE mmol/L 102   CO2 mmol/L 26   BUN mg/dL 19   CREATININE mg/dL 1 21   CALCIUM mg/dL 8 8   ALK PHOS U/L 69   ALT U/L 43   AST U/L 26           Imaging: I have personally reviewed pertinent reports  Ct Abdomen Pelvis Wo Contrast    Result Date: 3/15/2021  Narrative: CT ABDOMEN AND PELVIS WITHOUT IV CONTRAST INDICATION:   Flank pain, kidney stone suspected R flank pain   COMPARISON:  2/28/2021, 5/21/2013 TECHNIQUE:  CT examination of the abdomen and pelvis was performed without intravenous contrast   Axial, sagittal, and coronal 2D reformatted images were created from the source data and submitted for interpretation  Radiation dose length product (DLP) for this visit:  1162 83 mGy-cm   This examination, like all CT scans performed in the [facility], was performed utilizing techniques to minimize radiation dose exposure, including the use of iterative reconstruction and automated exposure control  Enteric contrast was administered  FINDINGS: ABDOMEN LOWER CHEST:  No clinically significant abnormality identified in the visualized lower chest  LIVER/BILIARY TREE:  There is diffuse hepatic steatosis with hyperdensity adjacent to the theodore hepatis consistent with focal fatty sparing  GALLBLADDER:  Gallbladder is surgically absent  SPLEEN:  Unremarkable  PANCREAS:  Unremarkable  ADRENAL GLANDS:  Unremarkable  KIDNEYS/URETERS:  The distal left ureteral calculus is no longer visualized  There is a 9 mm nonobstructing calculus in the right kidney  There are small calculi in the left kidney measuring up to 3 mm  There are fluid attenuation lesions in the right  lower pole, suboptimally evaluated without contrast though present in 2013 and likely small cysts  No hydronephrosis  STOMACH AND BOWEL:  There are colonic diverticula  APPENDIX:  No findings to suggest appendicitis  ABDOMINOPELVIC CAVITY: There is stranding in the small bowel mesentery without associated pathologic lymphadenopathy  This was also present in 2013, mildly increased  No ascites  No pneumoperitoneum  No lymphadenopathy  VESSELS:  Unremarkable for patient's age  PELVIS REPRODUCTIVE ORGANS:  Unremarkable for patient's age  URINARY BLADDER:  Unremarkable  ABDOMINAL WALL/INGUINAL REGIONS:  There is a fat-containing right inguinal hernia  OSSEOUS STRUCTURES:  No acute fracture or destructive osseous lesion  Impression: 1    Distal left ureteral calculus and hydronephrosis have resolved  2   Bilateral nonobstructing renal calculi measuring up to 9 mm on the right and 3 mm on the left  3   Diffuse hepatic steatosis with focal fatty sparing  4   Stranding in the small bowel mesentery, mildly increased from 2013 though without pathologic lymphadenopathy and 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  Workstation performed: [id number]     Xr Abdomen 1 View Kub    Result Date: 2/28/2021  Narrative: C-ARM - abdomen INDICATION: Pain  Procedure guidance  TECHNIQUE: FLUOROSCOPY TIME:   27 3 s 3 FLUOROSCOPIC IMAGES FINDINGS: Fluoroscopic guidance provided for procedure guidance  Spot images demonstrate placement of a wire followed by ureteral stent on the left  Osseous and soft tissue detail limited by technique  Impression: Fluoroscopic guidance provided for procedure guidance  Please refer to the separate procedure notes for additional details  Workstation performed: [id number]     Ct Renal Stone Study Abdomen Pelvis Wo Contrast    Result Date: 2/28/2021  Narrative: CT ABDOMEN AND PELVIS WITHOUT IV CONTRAST - LOW DOSE RENAL STONE INDICATION:   left flank pain  Centimeter noncentral of left flank pain 11:00 last evening  Nausea  Bloody diarrhea this morning  COMPARISON:  CT abdomen pelvis May 21, 2013  TECHNIQUE:  Low dose thin section CT examination of the abdomen and pelvis was performed without intravenous or oral contrast according to a protocol specifically designed to evaluate for urinary tract calculus  Axial, sagittal, and coronal 2D reformatted images were created from the source data and submitted for interpretation  Evaluation for pathology in the abdomen and pelvis that is unrelated to urinary tract calculi is limited  Radiation dose length product (DLP) for this visit:  487 mGy-cm     This examination, like all CT scans performed in the [facility], was performed utilizing techniques to minimize radiation dose exposure, including the use of iterative reconstruction and automated exposure control  FINDINGS: RIGHT KIDNEY AND URETER: There are nonobstructing intrarenal calculi measuring on the order of 2 to 9 mm  Largest calculus measures 9 x 8 mm in the upper pole (series 201, image 50; series 202, image 91)  No ureteric calculi  No hydronephrosis or hydroureter  Vague low-density lesions in the kidney, likely cysts  LEFT KIDNEY AND URETER: There are nonobstructing intrarenal calculi measuring on the order of 2 to 4 mm  Largest calculus measures 4 mm in the anterolateral upper pole (series 201, image 45; series 202, image 94)  There is moderate hydroureteronephrosis, up to the level of a 5 x 8 mm calculus in the distal ureter, directly above the ureterovesical junction (series 201, image 138; series 202, image 94)  There is moderate perinephric and periureteric inflammation, up to the level of the calculus  No perinephric fluid collections to suggest an abscess  URINARY BLADDER: Unremarkable  No significant abnormality in the visualized lung bases  Limited low radiation dose noncontrast CT evaluation demonstrates no clinically significant abnormality of spleen, pancreas, or adrenal glands  Fatty infiltrative changes in the liver  Gallbladder surgically absent  No ascites or bulky lymphadenopathy on this limited noncontrast study  Evaluation of the GI tract limited due to lack of oral contrast material  Stomach decompressed  Fecalization of small bowel contents, compatible with delayed small bowel transit  No evidence of small bowel obstruction  Normal fecal burden in the colon  Scattered diverticula of the descending colon and sigmoid colon  Nothing to suggest acute diverticulitis  Limited evaluation demonstrates no evidence to suggest acute appendicitis  Right inguinal hernia containing fat  Small umbilical hernia containing fat  There are age appropriate degenerative changes   No acute fracture or destructive osseous lesion  Impression: 1  Moderately obstructing 5 x 8 mm distal left ureteric calculus directly above the ureterovesical junction  2   Nonobstructing bilateral renal calculi  3   Probable right renal cysts  Follow-up nonemergent renal ultrasound recommended  4   Colonic diverticulosis  Recommend follow-up urology consultation  The study was marked in [facility] for immediate notification Workstation performed: [id number]       EKG, Pathology, and Other Studies Reviewed on Admission:   · EKG: na    [employer] Records Reviewed: Yes     ** Please Note: Dragon 360 Dictation voice to text software may have been used in the creation of this document   **

## 2021-03-16 NOTE — ASSESSMENT & PLAN NOTE
Sirs versus sepsis  POA, as evidenced by leukocytosis, tachycardia, with no clinic evidence of infection  Though suspect urinary tract infection  Patient received IV Rocephin in ED  Will continue  Check lactic acid, procalcitonin  IV hydration  Repeat CBC with diff, procalcitonin in cassandra Correa

## 2021-03-16 NOTE — ASSESSMENT & PLAN NOTE
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

## 2021-03-16 NOTE — ASSESSMENT & PLAN NOTE
CT showed - Diffuse hepatic steatosis with focal fatty sparing  Check lipid panel  Discussed with patient

## 2021-03-16 NOTE — DISCHARGE INSTRUCTIONS
Kidney Stones   WHAT YOU NEED TO KNOW:   Kidney stones form in the urinary system when the water and waste in your urine are out of balance  When this happens, certain types of waste crystals separate from the urine  The crystals build up and form kidney stones  You may have more than one kidney stone  DISCHARGE INSTRUCTIONS:   Seek care immediately if:   · You have vomiting that is not relieved by medicine  Contact your healthcare provider if:   · You have a fever  · You have trouble passing urine  · You see blood in your urine  · You have severe pain  · You have any questions or concerns about your condition or care  Medicines:   · NSAIDs , such as ibuprofen, help decrease swelling, pain, and fever  This medicine is available with or without a doctor's order  NSAIDs can cause stomach bleeding or kidney problems in certain people  If you take blood thinner medicine, always ask your healthcare provider if NSAIDs are safe for you  Always read the medicine label and follow directions  · Prescription pain medicine  may be given  Ask your healthcare provider how to take this medicine safely  Some prescription pain medicines contain acetaminophen  Do not take other medicines that contain acetaminophen without talking to your healthcare provider  Too much acetaminophen may cause liver damage  Prescription pain medicine may cause constipation  Ask your healthcare provider how to prevent or treat constipation  · Medicines  to balance your electrolytes may be needed  · Take your medicine as directed  Contact your healthcare provider if you think your medicine is not helping or if you have side effects  Tell him or her if you are allergic to any medicine  Keep a list of the medicines, vitamins, and herbs you take  Include the amounts, and when and why you take them  Bring the list or the pill bottles to follow-up visits   Carry your medicine list with you in case of an emergency  Follow up with your healthcare provider as directed: You may need to return for more tests  Write down your questions so you remember to ask them during your visits  What you can do to manage kidney stones:   · Drink more liquids  Your healthcare provider may tell you to drink at least 8 to 12 (eight-ounce) cups of liquids each day  This helps flush out the kidney stones when you urinate  Water is the best liquid to drink  · Strain your urine every time you go to the bathroom  Urinate through a strainer or a piece of thin cloth to catch the stones  Take the stones to your healthcare provider so they can be sent to the lab for tests  This will help your healthcare providers plan the best treatment for you  · Eat a variety of healthy foods  Healthy foods include fruits, vegetables, whole-grain breads, low-fat dairy products, beans, and fish  You may need to limit how much sodium (salt) or protein you eat  Ask for information about the best foods for you  · Stay active  Your stones may pass more easily if you stay active  Exercise can also help you manage your weight  Ask about the best activities for you  After you pass the kidney stones: Your healthcare provider may  order a 24-hour urine test  Results from a 24-hour urine test will help your healthcare provider plan ways to prevent more stones from forming  Your healthcare provider will give you more instructions  © Copyright [address] Information is for End User's use only and may not be sold, redistributed or otherwise used for commercial purposes  All illustrations and images included in CareNotes® are the copyrighted property of A D A M , Inc  or [address] [place] [address]  The above information is an  only  It is not intended as medical advice for individual conditions or treatments   Talk to your doctor, nurse or pharmacist before following any medical regimen to see if it is safe and effective for you

## 2021-03-16 NOTE — PLAN OF CARE
Problem: PAIN - ADULT  Goal: Verbalizes/displays adequate comfort level or baseline comfort level  Description: Interventions:  - Encourage patient to monitor pain and request assistance  - Assess pain using appropriate pain scale  - Administer analgesics based on type and severity of pain and evaluate response  - Implement non-pharmacological measures as appropriate and evaluate response  - Consider cultural and social influences on pain and pain management  - Notify physician/advanced practitioner if interventions unsuccessful or patient reports new pain  Outcome: Adequate for Discharge     Problem: GENITOURINARY - ADULT  Goal: Maintains or returns to baseline urinary function  Description: INTERVENTIONS:  - Assess urinary function  - Encourage oral fluids to ensure adequate hydration if ordered  - Administer IV fluids as ordered to ensure adequate hydration  - Administer ordered medications as needed  - Offer frequent toileting  - Follow urinary retention protocol if ordered  Outcome: Adequate for Discharge

## 2021-03-16 NOTE — ASSESSMENT & PLAN NOTE
· Patient presented with right flank/right kidney pain started yesterday morning  · Recent history of left flank pain secondary to obstructing ureteral calculus about 2 weeks ago  Patient underwent cystoscopy, ureteroscopy with lithotripsy, pyelogram and left ureteral stent placement  Status post stent removal last week  · CT abdomen pelvis  - The distal left ureteral calculus is no longer visualized  There is a 9 mm nonobstructing calculus in the right kidney  There are small calculi in the left kidney measuring up to 3 mm  · UA no evidence of infection  · WBC 17 63, low-grade fever 99 5 in ED  · Patient was seen by his urologist, Dr Yunior Brantley ED  Recommend antibiotic for possible UTI involving pyelonephritis  · Patient received IV Rocephin in ED, will continue     Patient's lactic acid level was normal and procalcitonin level was 0 14  Patient has no further fever or pain or leukocytosis  Urine was sent for culture  Patient will be discharged on Keflex 1 milligram p o  4 times a day with outpatient follow-up with Urology

## 2021-03-16 NOTE — ASSESSMENT & PLAN NOTE
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

## 2021-03-17 LAB — BACTERIA UR CULT: NORMAL

## 2021-04-02 ENCOUNTER — TELEPHONE (OUTPATIENT)
Dept: OTHER | Facility: HOSPITAL | Age: 56
End: 2021-04-02

## 2021-04-07 ENCOUNTER — TRANSCRIBE ORDERS (OUTPATIENT)
Dept: ADMINISTRATIVE | Facility: HOSPITAL | Age: 56
End: 2021-04-07

## 2021-04-07 ENCOUNTER — HOSPITAL ENCOUNTER (OUTPATIENT)
Dept: RADIOLOGY | Facility: HOSPITAL | Age: 56
Discharge: HOME | End: 2021-04-07
Attending: SPECIALIST
Payer: COMMERCIAL

## 2021-04-07 ENCOUNTER — APPOINTMENT (EMERGENCY)
Dept: RADIOLOGY | Facility: HOSPITAL | Age: 56
End: 2021-04-07
Payer: COMMERCIAL

## 2021-04-07 ENCOUNTER — HOSPITAL ENCOUNTER (EMERGENCY)
Facility: HOSPITAL | Age: 56
Discharge: HOME | End: 2021-04-07
Attending: EMERGENCY MEDICINE | Admitting: EMERGENCY MEDICINE
Payer: COMMERCIAL

## 2021-04-07 VITALS
OXYGEN SATURATION: 97 % | DIASTOLIC BLOOD PRESSURE: 82 MMHG | SYSTOLIC BLOOD PRESSURE: 127 MMHG | HEART RATE: 85 BPM | TEMPERATURE: 97.8 F | RESPIRATION RATE: 18 BRPM

## 2021-04-07 DIAGNOSIS — K40.90 RIGHT INGUINAL HERNIA: ICD-10-CM

## 2021-04-07 DIAGNOSIS — R10.9 RIGHT FLANK PAIN: Primary | ICD-10-CM

## 2021-04-07 DIAGNOSIS — N20.0 URIC ACID NEPHROLITHIASIS: Primary | ICD-10-CM

## 2021-04-07 DIAGNOSIS — N20.0 URIC ACID NEPHROLITHIASIS: ICD-10-CM

## 2021-04-07 DIAGNOSIS — N20.0 BILATERAL KIDNEY STONES: ICD-10-CM

## 2021-04-07 LAB
ALBUMIN SERPL BCP-MCNC: 3.6 G/DL (ref 3.5–5)
ALP SERPL-CCNC: 68 U/L (ref 46–116)
ALT SERPL W P-5'-P-CCNC: 48 U/L (ref 12–78)
ANION GAP SERPL CALCULATED.3IONS-SCNC: 4 MMOL/L (ref 4–13)
AST SERPL W P-5'-P-CCNC: 30 U/L (ref 5–45)
BASOPHILS # BLD AUTO: 0.05 THOUSANDS/ΜL (ref 0–0.1)
BASOPHILS NFR BLD AUTO: 1 % (ref 0–1)
BILIRUB SERPL-MCNC: 0.41 MG/DL (ref 0.2–1)
BILIRUB UR QL STRIP: NEGATIVE
BUN SERPL-MCNC: 21 MG/DL (ref 5–25)
CALCIUM SERPL-MCNC: 8.9 MG/DL (ref 8.3–10.1)
CHLORIDE SERPL-SCNC: 102 MMOL/L (ref 100–108)
CLARITY UR: CLEAR
CO2 SERPL-SCNC: 30 MMOL/L (ref 21–32)
COLOR UR: NORMAL
CREAT SERPL-MCNC: 1.17 MG/DL (ref 0.6–1.3)
EOSINOPHIL # BLD AUTO: 0.15 THOUSAND/ΜL (ref 0–0.61)
EOSINOPHIL NFR BLD AUTO: 2 % (ref 0–6)
ERYTHROCYTE [DISTWIDTH] IN BLOOD BY AUTOMATED COUNT: 11.7 % (ref 11.6–15.1)
GFR SERPL CREATININE-BSD FRML MDRD: 69 ML/MIN/1.73SQ M
GLUCOSE SERPL-MCNC: 104 MG/DL (ref 65–140)
GLUCOSE UR STRIP-MCNC: NEGATIVE MG/DL
HCT VFR BLD AUTO: 45.4 % (ref 36.5–49.3)
HGB BLD-MCNC: 15.2 G/DL (ref 12–17)
HGB UR QL STRIP.AUTO: NEGATIVE
IMM GRANULOCYTES # BLD AUTO: 0.1 THOUSAND/UL (ref 0–0.2)
IMM GRANULOCYTES NFR BLD AUTO: 1 % (ref 0–2)
KETONES UR STRIP-MCNC: NEGATIVE MG/DL
LEUKOCYTE ESTERASE UR QL STRIP: NEGATIVE
LIPASE SERPL-CCNC: 134 U/L (ref 73–393)
LYMPHOCYTES # BLD AUTO: 2.71 THOUSANDS/ΜL (ref 0.6–4.47)
LYMPHOCYTES NFR BLD AUTO: 31 % (ref 14–44)
MCH RBC QN AUTO: 29.9 PG (ref 26.8–34.3)
MCHC RBC AUTO-ENTMCNC: 33.5 G/DL (ref 31.4–37.4)
MCV RBC AUTO: 89 FL (ref 82–98)
MONOCYTES # BLD AUTO: 0.88 THOUSAND/ΜL (ref 0.17–1.22)
MONOCYTES NFR BLD AUTO: 10 % (ref 4–12)
NEUTROPHILS # BLD AUTO: 4.77 THOUSANDS/ΜL (ref 1.85–7.62)
NEUTS SEG NFR BLD AUTO: 55 % (ref 43–75)
NITRITE UR QL STRIP: NEGATIVE
NRBC BLD AUTO-RTO: 0 /100 WBCS
PH UR STRIP.AUTO: 6 [PH]
PLATELET # BLD AUTO: 254 THOUSANDS/UL (ref 149–390)
PMV BLD AUTO: 8.5 FL (ref 8.9–12.7)
POTASSIUM SERPL-SCNC: 4 MMOL/L (ref 3.5–5.3)
PROT SERPL-MCNC: 6.8 G/DL (ref 6.4–8.2)
PROT UR STRIP-MCNC: NEGATIVE MG/DL
RBC # BLD AUTO: 5.08 MILLION/UL (ref 3.88–5.62)
SODIUM SERPL-SCNC: 136 MMOL/L (ref 136–145)
SP GR UR STRIP.AUTO: 1.02 (ref 1–1.03)
UROBILINOGEN UR QL STRIP.AUTO: 0.2 E.U./DL
WBC # BLD AUTO: 8.66 THOUSAND/UL (ref 4.31–10.16)

## 2021-04-07 PROCEDURE — 74018 RADEX ABDOMEN 1 VIEW: CPT

## 2021-04-07 PROCEDURE — 36415 COLL VENOUS BLD VENIPUNCTURE: CPT | Performed by: EMERGENCY MEDICINE

## 2021-04-07 PROCEDURE — 74176 CT ABD & PELVIS W/O CONTRAST: CPT

## 2021-04-07 PROCEDURE — 80053 COMPREHEN METABOLIC PANEL: CPT | Performed by: EMERGENCY MEDICINE

## 2021-04-07 PROCEDURE — 99285 EMERGENCY DEPT VISIT HI MDM: CPT | Performed by: EMERGENCY MEDICINE

## 2021-04-07 PROCEDURE — 96375 TX/PRO/DX INJ NEW DRUG ADDON: CPT

## 2021-04-07 PROCEDURE — 96361 HYDRATE IV INFUSION ADD-ON: CPT

## 2021-04-07 PROCEDURE — 85025 COMPLETE CBC W/AUTO DIFF WBC: CPT | Performed by: EMERGENCY MEDICINE

## 2021-04-07 PROCEDURE — 81003 URINALYSIS AUTO W/O SCOPE: CPT | Performed by: EMERGENCY MEDICINE

## 2021-04-07 PROCEDURE — 99284 EMERGENCY DEPT VISIT MOD MDM: CPT

## 2021-04-07 PROCEDURE — G1004 CDSM NDSC: HCPCS

## 2021-04-07 PROCEDURE — 83690 ASSAY OF LIPASE: CPT | Performed by: EMERGENCY MEDICINE

## 2021-04-07 PROCEDURE — 96374 THER/PROPH/DIAG INJ IV PUSH: CPT

## 2021-04-07 RX ORDER — KETOROLAC TROMETHAMINE 10 MG/1
10 TABLET, FILM COATED ORAL EVERY 6 HOURS PRN
COMMUNITY

## 2021-04-07 RX ORDER — KETOROLAC TROMETHAMINE 30 MG/ML
15 INJECTION, SOLUTION INTRAMUSCULAR; INTRAVENOUS ONCE
Status: COMPLETED | OUTPATIENT
Start: 2021-04-07 | End: 2021-04-07

## 2021-04-07 RX ORDER — ONDANSETRON 2 MG/ML
4 INJECTION INTRAMUSCULAR; INTRAVENOUS ONCE
Status: COMPLETED | OUTPATIENT
Start: 2021-04-07 | End: 2021-04-07

## 2021-04-07 RX ORDER — HYDROMORPHONE HCL/PF 1 MG/ML
0.5 SYRINGE (ML) INJECTION ONCE
Status: COMPLETED | OUTPATIENT
Start: 2021-04-07 | End: 2021-04-07

## 2021-04-07 RX ADMIN — ONDANSETRON 4 MG: 2 INJECTION INTRAMUSCULAR; INTRAVENOUS at 16:41

## 2021-04-07 RX ADMIN — SODIUM CHLORIDE 1000 ML: 0.9 INJECTION, SOLUTION INTRAVENOUS at 16:47

## 2021-04-07 RX ADMIN — KETOROLAC TROMETHAMINE 15 MG: 30 INJECTION, SOLUTION INTRAMUSCULAR at 16:42

## 2021-04-07 RX ADMIN — HYDROMORPHONE HYDROCHLORIDE 0.5 MG: 1 INJECTION, SOLUTION INTRAMUSCULAR; INTRAVENOUS; SUBCUTANEOUS at 18:07

## 2021-04-07 NOTE — ED PROVIDER NOTES
History  Chief Complaint   Patient presents with    Flank Pain     R fllank pain traveling to front  having trouble urinating, hx of stones     Patient is a 63year old male with a past medical history significant for bilateral kidney stones who presents with right flank pain  Patient was at a follow up with his urologist, Dr Isidro Schmitt earlier in the day, was prescribed toradol, which he had not yet taken, was schedule for procedure next week, and was recommended to go to the ER for worsening pain, fevers  Patient reports that he has constant right sided flank pain that starts radiating into the right upper abdomen, waxes and wanes, moderate to severe in intensity, sharp in nature, associated with trouble urinating today  Patient states that the last time he urinated was this morning  Denies any fevers, chills, hematuria, testicular pain, abdominal pain, nausea, vomiting  Prior to Admission Medications   Prescriptions Last Dose Informant Patient Reported? Taking?    Tamsulosin HCl (FLOMAX PO)   Yes No   Sig: Take 0 4 mg by mouth daily   ketorolac (TORADOL) 10 mg tablet 4/7/2021 at Unknown time  Yes Yes   Sig: Take 10 mg by mouth every 6 (six) hours as needed for moderate pain   omeprazole (PriLOSEC) 40 MG capsule   No No   Sig: Take 1 capsule (40 mg total) by mouth daily   sildenafil (VIAGRA) 100 mg tablet   Yes No   Sig: Take 100 mg by mouth daily as needed for erectile dysfunction      Facility-Administered Medications: None       Past Medical History:   Diagnosis Date    Anxiety     Depression     GERD (gastroesophageal reflux disease)     Kidney stones        Past Surgical History:   Procedure Laterality Date    ADENOIDECTOMY      CHOLECYSTECTOMY      INGUINAL HERNIA REPAIR      MD CYSTO/URETERO W/LITHOTRIPSY &INDWELL STENT INSRT Left 2/28/2021    Procedure: CYSTOSCOPY URETEROSCOPY WITH LITHOTRIPSY HOLMIUM LASER, RETROGRADE PYELOGRAM AND INSERTION STENT URETERAL;  Surgeon: Casey Levy, MD;  [facility];  Service: Urology    TONSILLECTOMY         Family History   Problem Relation Age of Onset    Cancer Father         prostate    Diabetes Father      I have reviewed and agree with the history as documented  E-Cigarette/Vaping    E-Cigarette Use Never User      E-Cigarette/Vaping Substances     Social History     Tobacco Use    Smoking status: Never Smoker    Smokeless tobacco: Never Used   Substance Use Topics    Alcohol use: Never     Frequency: Never    Drug use: Never       Review of Systems   Constitutional: Negative for chills and fever  HENT: Negative for congestion and rhinorrhea  Eyes: Negative for photophobia and visual disturbance  Respiratory: Negative for cough and shortness of breath  Cardiovascular: Negative for chest pain and palpitations  Gastrointestinal: Negative for abdominal pain, blood in stool, constipation, diarrhea, nausea and vomiting  Genitourinary: Positive for difficulty urinating and flank pain  Negative for decreased urine volume, dysuria, frequency, hematuria, penile pain, penile swelling, scrotal swelling, testicular pain and urgency  Musculoskeletal: Negative for back pain, neck pain and neck stiffness  Skin: Negative for color change and pallor  Neurological: Negative for dizziness, light-headedness and headaches  Physical Exam  Physical Exam  Vitals signs and nursing note reviewed  Constitutional:       General: He is not in acute distress  Appearance: Normal appearance  He is well-developed  He is not ill-appearing, toxic-appearing or diaphoretic  HENT:      Head: Normocephalic and atraumatic  Right Ear: External ear normal       Left Ear: External ear normal       Nose: Nose normal       Mouth/Throat:      Pharynx: No oropharyngeal exudate  Eyes:      Conjunctiva/sclera: Conjunctivae normal       Pupils: Pupils are equal, round, and reactive to light     Neck:      Musculoskeletal: Normal range of motion and neck supple  Cardiovascular:      Rate and Rhythm: Normal rate and regular rhythm  Heart sounds: Normal heart sounds  No murmur  No gallop  Pulmonary:      Effort: Pulmonary effort is normal  No respiratory distress  Breath sounds: Normal breath sounds  No stridor  No wheezing, rhonchi or rales  Chest:      Chest wall: No tenderness  Abdominal:      General: Bowel sounds are normal  There is no distension  Palpations: Abdomen is soft  There is no mass  Tenderness: There is no abdominal tenderness  There is right CVA tenderness  There is no left CVA tenderness, guarding or rebound  Hernia: No hernia is present  Musculoskeletal: Normal range of motion  General: No tenderness  Skin:     General: Skin is warm and dry  Neurological:      General: No focal deficit present  Mental Status: He is alert and oriented to person, place, and time  Mental status is at baseline  Psychiatric:         Mood and Affect: Mood is anxious           Behavior: Behavior normal          Vital Signs  ED Triage Vitals [04/07/21 1558]   Temperature Pulse Respirations Blood Pressure SpO2   97 8 °F (36 6 °C) 86 20 138/80 97 %      Temp Source Heart Rate Source Patient Position - Orthostatic VS BP Location FiO2 (%)   Tympanic Monitor Sitting Left arm --      Pain Score       Worst Possible Pain           Vitals:    04/07/21 1558 04/07/21 1815   BP: 138/80 127/82   Pulse: 86 85   Patient Position - Orthostatic VS: Sitting Lying         Visual Acuity      ED Medications  Medications   sodium chloride 0 9 % bolus 1,000 mL (0 mL Intravenous Stopped 4/7/21 1747)   ondansetron (ZOFRAN) injection 4 mg (4 mg Intravenous Given 4/7/21 1641)   ketorolac (TORADOL) injection 15 mg (15 mg Intravenous Given 4/7/21 1642)   HYDROmorphone (DILAUDID) injection 0 5 mg (0 5 mg Intravenous Given 4/7/21 1807)       Diagnostic Studies  Results Reviewed     Procedure Component Value Units Date/Time    UA w Reflex to Microscopic w Reflex to Culture [[id number]] Collected: 04/07/21 2040    Lab Status: Final result Specimen: Urine, Clean Catch Updated: 04/07/21 2047     Color, UA Light Yellow     Clarity, UA Clear     Specific Gravity, UA 1 025     pH, UA 6 0     Leukocytes, UA Negative     Nitrite, UA Negative     Protein, UA Negative mg/dl      Glucose, UA Negative mg/dl      Ketones, UA Negative mg/dl      Urobilinogen, UA 0 2 E U /dl      Bilirubin, UA Negative     Blood, UA Negative    Comprehensive metabolic panel [[id number]] Collected: 04/07/21 1641    Lab Status: Final result Specimen: Blood from Arm, Left Updated: 04/07/21 1711     Sodium 136 mmol/L      Potassium 4 0 mmol/L      Chloride 102 mmol/L      CO2 30 mmol/L      ANION GAP 4 mmol/L      BUN 21 mg/dL      Creatinine 1 17 mg/dL      Glucose 104 mg/dL      Calcium 8 9 mg/dL      AST 30 U/L      ALT 48 U/L      Alkaline Phosphatase 68 U/L      Total Protein 6 8 g/dL      Albumin 3 6 g/dL      Total Bilirubin 0 41 mg/dL      eGFR 69 ml/min/1 73sq m     Narrative:      [place] guidelines for Chronic Kidney Disease (CKD):     Stage 1 with normal or high GFR (GFR > 90 mL/min/1 73 square meters)    Stage 2 Mild CKD (GFR = 60-89 mL/min/1 73 square meters)    Stage 3A Moderate CKD (GFR = 45-59 mL/min/1 73 square meters)    Stage 3B Moderate CKD (GFR = 30-44 mL/min/1 73 square meters)    Stage 4 Severe CKD (GFR = 15-29 mL/min/1 73 square meters)    Stage 5 End Stage CKD (GFR <15 mL/min/1 73 square meters)  Note: GFR calculation is accurate only with a steady state creatinine    Lipase [[id number]]  (Normal) Collected: 04/07/21 1641    Lab Status: Final result Specimen: Blood from Arm, Left Updated: 04/07/21 1711     Lipase 134 u/L     CBC and differential [[id number]]  (Abnormal) Collected: 04/07/21 1641    Lab Status: Final result Specimen: Blood from Arm, Left Updated: 04/07/21 1654     WBC 8 66 Thousand/uL      RBC 5 08 Million/uL Hemoglobin 15 2 g/dL      Hematocrit 45 4 %      MCV 89 fL      MCH 29 9 pg      MCHC 33 5 g/dL      RDW 11 7 %      MPV 8 5 fL      Platelets 063 Thousands/uL      nRBC 0 /100 WBCs      Neutrophils Relative 55 %      Immat GRANS % 1 %      Lymphocytes Relative 31 %      Monocytes Relative 10 %      Eosinophils Relative 2 %      Basophils Relative 1 %      Neutrophils Absolute 4 77 Thousands/µL      Immature Grans Absolute 0 10 Thousand/uL      Lymphocytes Absolute 2 71 Thousands/µL      Monocytes Absolute 0 88 Thousand/µL      Eosinophils Absolute 0 15 Thousand/µL      Basophils Absolute 0 05 Thousands/µL                  CT renal stone study abdomen pelvis wo contrast   Final Result by Zee Ventura MD (04/07 1945)      Bilateral nonobstructive renal calculi  Workstation performed: [id number]                    Procedures  Procedures         ED Course  ED Course as of Apr 07 2327 Wed Apr 07, 2021   4727 Bladder scan 50  Will let the 1L NS infuse and then have patient attempt to urinate again  2036 Discussed with Dr Kelvin Flores, urologist who recommends staying for admission, pain management and assessment in the morning  2054 Had a shared decision making discussion with the patient and his significant other  I relayed my conversation with Dr Kelvin Flores, patient's urologist to the patient in regards to recommendation to stay in the hospital for admission for observation, to be kept nothing by mouth for potential procedure if necessary, to be evaluated by Urology in the morning and for pain control  Patient reports that while his pain is starting to come back, he does not want to be admitted to the hospital for observation and understands that his pain might worsen/return and that his kidney function might be progressively worsening                                   SBIRT 20yo+      Most Recent Value   SBIRT (24 yo +)   In order to provide better care to our patients, we are screening all of our patients for alcohol and drug use  Would it be okay to ask you these screening questions? No Filed at: 04/07/2021 1646                    [facility]  Number of Diagnoses or Management Options  Bilateral kidney stones:   Right flank pain:   Right inguinal hernia:   Diagnosis management comments: Assessment and Plan:   63year old M presenting with right flank pain in setting of known kidney stones  As patient has large stones on the right side, repeat labs to assess for kidney function, leukocytosis  Urinalysis to assess for UTI  CT renal stone study to assess for location of right sided stones and check for hydro  Recommendation for patient to be admitted for observation by urologist, Dr Ines Buck, but after lengthy discussion with the patient and his wife, patient decided that he would prefer to be discharged  Patient has capacity and understands risks of leaving against medical advice  Disposition  Final diagnoses:   Right flank pain   Bilateral kidney stones   Right inguinal hernia     Time reflects when diagnosis was documented in both MDM as applicable and the Disposition within this note     Time User Action Codes Description Comment    4/7/2021  9:00 PM Ihsan Bleak Add [R10 9] Right flank pain     4/7/2021  9:00 PM Ihsan Bleak Add [N20 0] Bilateral kidney stones     4/7/2021  9:00 PM Ihsan Bleak Add [K40 90] Right inguinal hernia       ED Disposition     ED Disposition Condition Date/Time Comment    Discharge Stable Wed Apr 7, 2021  8:59 PM Kailey Laurent discharge to home/self care              Follow-up Information     Follow up With Specialties Details Why Contact Info Additional [address] Emergency Department Emergency Medicine Go to  As needed, If symptoms worsen, for re-evaluation [address] [place] [address] [address] [address] [id number] [address] Emergency Department, [facility] Taj Fraire MD Urology   [address]  [phone]             Discharge Medication List as of 4/7/2021  9:09 PM      CONTINUE these medications which have NOT CHANGED    Details   ketorolac (TORADOL) 10 mg tablet Take 10 mg by mouth every 6 (six) hours as needed for moderate pain, Historical Med      omeprazole (PriLOSEC) 40 MG capsule Take 1 capsule (40 mg total) by mouth daily, Starting Mon 9/30/2019, Normal      sildenafil (VIAGRA) 100 mg tablet Take 100 mg by mouth daily as needed for erectile dysfunction, Historical Med      Tamsulosin HCl (FLOMAX PO) Take 0 4 mg by mouth daily, Historical Med           No discharge procedures on file      PDMP Review     None          ED Provider  Electronically Signed by           Alfredo Edmondson DO  04/07/21 5149

## 2021-04-08 ENCOUNTER — HOSPITAL ENCOUNTER (OUTPATIENT)
Facility: HOSPITAL | Age: 56
Setting detail: OUTPATIENT SURGERY
End: 2021-04-08
Attending: SPECIALIST | Admitting: SPECIALIST
Payer: COMMERCIAL

## 2021-04-08 DIAGNOSIS — Z23 ENCOUNTER FOR IMMUNIZATION: ICD-10-CM

## 2021-04-08 NOTE — DISCHARGE INSTRUCTIONS
Your CT SCAN FINDINGS today showed:  RIGHT KIDNEY AND URETER:  7 mm upper pole calculus  Other less than 2 mm calculi seen in the upper pole, midpole and lower pole     No hydronephrosis or hydroureter  LEFT KIDNEY AND URETER:  4 4 mm calculus in the midpole and 2 mm calculus     No hydronephrosis or hydroureter  URINARY BLADDER:   Unremarkable  No significant abnormality in the visualized lung bases  Limited low radiation dose noncontrast CT evaluation demonstrates no clinically significant abnormality of liver, spleen, pancreas, or adrenal glands  The gallbladder is surgically absent  No ascites or bulky lymphadenopathy on this limited noncontrast study  Bowel loops appear unremarkable  Limited evaluation demonstrates no evidence to suggest acute appendicitis  No acute fracture or destructive osseous lesion is identified  Small right inguinal hernia

## 2021-04-09 DIAGNOSIS — U07.1 COVID-19: ICD-10-CM

## 2021-04-09 LAB — SARS-COV-2 RNA RESP QL NAA+PROBE: NEGATIVE

## 2021-04-09 PROCEDURE — U0005 INFEC AGEN DETEC AMPLI PROBE: HCPCS | Performed by: SPECIALIST

## 2021-04-09 PROCEDURE — U0003 INFECTIOUS AGENT DETECTION BY NUCLEIC ACID (DNA OR RNA); SEVERE ACUTE RESPIRATORY SYNDROME CORONAVIRUS 2 (SARS-COV-2) (CORONAVIRUS DISEASE [COVID-19]), AMPLIFIED PROBE TECHNIQUE, MAKING USE OF HIGH THROUGHPUT TECHNOLOGIES AS DESCRIBED BY CMS-2020-01-R: HCPCS | Performed by: SPECIALIST

## 2021-04-12 ENCOUNTER — APPOINTMENT (EMERGENCY)
Dept: RADIOLOGY | Facility: HOSPITAL | Age: 56
End: 2021-04-12
Payer: COMMERCIAL

## 2021-04-12 ENCOUNTER — HOSPITAL ENCOUNTER (EMERGENCY)
Facility: HOSPITAL | Age: 56
Discharge: HOME | End: 2021-04-12
Attending: EMERGENCY MEDICINE | Admitting: EMERGENCY MEDICINE
Payer: COMMERCIAL

## 2021-04-12 VITALS
TEMPERATURE: 98.6 F | HEART RATE: 80 BPM | OXYGEN SATURATION: 98 % | SYSTOLIC BLOOD PRESSURE: 143 MMHG | BODY MASS INDEX: 39.94 KG/M2 | WEIGHT: 255 LBS | DIASTOLIC BLOOD PRESSURE: 85 MMHG | RESPIRATION RATE: 18 BRPM

## 2021-04-12 DIAGNOSIS — M54.00 PANNICULITIS AFFECTING BACK: Primary | ICD-10-CM

## 2021-04-12 LAB
ALBUMIN SERPL BCP-MCNC: 3.9 G/DL (ref 3.5–5)
ALP SERPL-CCNC: 71 U/L (ref 46–116)
ALT SERPL W P-5'-P-CCNC: 46 U/L (ref 12–78)
ANION GAP SERPL CALCULATED.3IONS-SCNC: 8 MMOL/L (ref 4–13)
AST SERPL W P-5'-P-CCNC: 25 U/L (ref 5–45)
BACTERIA UR QL AUTO: ABNORMAL /HPF
BASOPHILS # BLD AUTO: 0.05 THOUSANDS/ΜL (ref 0–0.1)
BASOPHILS NFR BLD AUTO: 1 % (ref 0–1)
BILIRUB SERPL-MCNC: 0.51 MG/DL (ref 0.2–1)
BILIRUB UR QL STRIP: ABNORMAL
BUN SERPL-MCNC: 15 MG/DL (ref 5–25)
CALCIUM SERPL-MCNC: 8.6 MG/DL (ref 8.3–10.1)
CHLORIDE SERPL-SCNC: 102 MMOL/L (ref 100–108)
CLARITY UR: CLEAR
CO2 SERPL-SCNC: 28 MMOL/L (ref 21–32)
COLOR UR: YELLOW
CREAT SERPL-MCNC: 1.19 MG/DL (ref 0.6–1.3)
EOSINOPHIL # BLD AUTO: 0.13 THOUSAND/ΜL (ref 0–0.61)
EOSINOPHIL NFR BLD AUTO: 2 % (ref 0–6)
ERYTHROCYTE [DISTWIDTH] IN BLOOD BY AUTOMATED COUNT: 11.9 % (ref 11.6–15.1)
GFR SERPL CREATININE-BSD FRML MDRD: 68 ML/MIN/1.73SQ M
GLUCOSE SERPL-MCNC: 111 MG/DL (ref 65–140)
GLUCOSE UR STRIP-MCNC: NEGATIVE MG/DL
HCT VFR BLD AUTO: 45 % (ref 36.5–49.3)
HGB BLD-MCNC: 15.1 G/DL (ref 12–17)
HGB UR QL STRIP.AUTO: NEGATIVE
HYALINE CASTS #/AREA URNS LPF: ABNORMAL /LPF
IMM GRANULOCYTES # BLD AUTO: 0.1 THOUSAND/UL (ref 0–0.2)
IMM GRANULOCYTES NFR BLD AUTO: 1 % (ref 0–2)
KETONES UR STRIP-MCNC: NEGATIVE MG/DL
LEUKOCYTE ESTERASE UR QL STRIP: NEGATIVE
LIPASE SERPL-CCNC: 105 U/L (ref 73–393)
LYMPHOCYTES # BLD AUTO: 2.76 THOUSANDS/ΜL (ref 0.6–4.47)
LYMPHOCYTES NFR BLD AUTO: 31 % (ref 14–44)
MCH RBC QN AUTO: 30 PG (ref 26.8–34.3)
MCHC RBC AUTO-ENTMCNC: 33.6 G/DL (ref 31.4–37.4)
MCV RBC AUTO: 90 FL (ref 82–98)
MONOCYTES # BLD AUTO: 0.93 THOUSAND/ΜL (ref 0.17–1.22)
MONOCYTES NFR BLD AUTO: 11 % (ref 4–12)
MUCOUS THREADS UR QL AUTO: ABNORMAL
NEUTROPHILS # BLD AUTO: 4.83 THOUSANDS/ΜL (ref 1.85–7.62)
NEUTS SEG NFR BLD AUTO: 54 % (ref 43–75)
NITRITE UR QL STRIP: NEGATIVE
NON-SQ EPI CELLS URNS QL MICRO: ABNORMAL /HPF
NRBC BLD AUTO-RTO: 0 /100 WBCS
PH UR STRIP.AUTO: 5.5 [PH]
PLATELET # BLD AUTO: 229 THOUSANDS/UL (ref 149–390)
PMV BLD AUTO: 8.8 FL (ref 8.9–12.7)
POTASSIUM SERPL-SCNC: 3.8 MMOL/L (ref 3.5–5.3)
PROT SERPL-MCNC: 6.9 G/DL (ref 6.4–8.2)
PROT UR STRIP-MCNC: ABNORMAL MG/DL
RBC # BLD AUTO: 5.03 MILLION/UL (ref 3.88–5.62)
RBC #/AREA URNS AUTO: ABNORMAL /HPF
SODIUM SERPL-SCNC: 138 MMOL/L (ref 136–145)
SP GR UR STRIP.AUTO: >=1.03 (ref 1–1.03)
UROBILINOGEN UR QL STRIP.AUTO: 0.2 E.U./DL
WBC # BLD AUTO: 8.8 THOUSAND/UL (ref 4.31–10.16)
WBC #/AREA URNS AUTO: ABNORMAL /HPF

## 2021-04-12 PROCEDURE — 87086 URINE CULTURE/COLONY COUNT: CPT | Performed by: EMERGENCY MEDICINE

## 2021-04-12 PROCEDURE — 96374 THER/PROPH/DIAG INJ IV PUSH: CPT

## 2021-04-12 PROCEDURE — 96361 HYDRATE IV INFUSION ADD-ON: CPT

## 2021-04-12 PROCEDURE — 81001 URINALYSIS AUTO W/SCOPE: CPT | Performed by: EMERGENCY MEDICINE

## 2021-04-12 PROCEDURE — 99284 EMERGENCY DEPT VISIT MOD MDM: CPT

## 2021-04-12 PROCEDURE — 83690 ASSAY OF LIPASE: CPT | Performed by: EMERGENCY MEDICINE

## 2021-04-12 PROCEDURE — 80053 COMPREHEN METABOLIC PANEL: CPT | Performed by: EMERGENCY MEDICINE

## 2021-04-12 PROCEDURE — 96375 TX/PRO/DX INJ NEW DRUG ADDON: CPT

## 2021-04-12 PROCEDURE — G1004 CDSM NDSC: HCPCS

## 2021-04-12 PROCEDURE — 85025 COMPLETE CBC W/AUTO DIFF WBC: CPT | Performed by: EMERGENCY MEDICINE

## 2021-04-12 PROCEDURE — 36415 COLL VENOUS BLD VENIPUNCTURE: CPT | Performed by: EMERGENCY MEDICINE

## 2021-04-12 PROCEDURE — 74177 CT ABD & PELVIS W/CONTRAST: CPT

## 2021-04-12 PROCEDURE — 99284 EMERGENCY DEPT VISIT MOD MDM: CPT | Performed by: EMERGENCY MEDICINE

## 2021-04-12 RX ORDER — HYDROCODONE BITARTRATE AND ACETAMINOPHEN 5; 325 MG/1; MG/1
1 TABLET ORAL EVERY 6 HOURS PRN
COMMUNITY

## 2021-04-12 RX ORDER — KETOROLAC TROMETHAMINE 30 MG/ML
15 INJECTION, SOLUTION INTRAMUSCULAR; INTRAVENOUS ONCE
Status: COMPLETED | OUTPATIENT
Start: 2021-04-12 | End: 2021-04-12

## 2021-04-12 RX ORDER — ONDANSETRON 2 MG/ML
4 INJECTION INTRAMUSCULAR; INTRAVENOUS ONCE
Status: COMPLETED | OUTPATIENT
Start: 2021-04-12 | End: 2021-04-12

## 2021-04-12 RX ORDER — HYDROMORPHONE HCL/PF 1 MG/ML
1 SYRINGE (ML) INJECTION ONCE
Status: DISCONTINUED | OUTPATIENT
Start: 2021-04-12 | End: 2021-04-12 | Stop reason: HOSPADM

## 2021-04-12 RX ORDER — HYDROCODONE BITARTRATE AND ACETAMINOPHEN 5; 325 MG/1; MG/1
1 TABLET ORAL EVERY 6 HOURS PRN
Qty: 10 TABLET | Refills: 0 | Status: SHIPPED | OUTPATIENT
Start: 2021-04-12 | End: 2021-04-22

## 2021-04-12 RX ADMIN — ONDANSETRON 4 MG: 2 INJECTION INTRAMUSCULAR; INTRAVENOUS at 18:15

## 2021-04-12 RX ADMIN — IOHEXOL 100 ML: 350 INJECTION, SOLUTION INTRAVENOUS at 18:59

## 2021-04-12 RX ADMIN — SODIUM CHLORIDE 1000 ML: 0.9 INJECTION, SOLUTION INTRAVENOUS at 18:05

## 2021-04-12 RX ADMIN — KETOROLAC TROMETHAMINE 15 MG: 30 INJECTION, SOLUTION INTRAMUSCULAR at 18:03

## 2021-04-12 NOTE — Clinical Note
Johnna Angel was seen and treated in our emergency department on 4/12/2021  Diagnosis:     Sarah Ames  may return to work on return date  He may return on this date: 04/15/2021         If you have any questions or concerns, please don't hesitate to call        Sarah Trimble, DO    ______________________________           _______________          _______________  Hospital Representative                              Date                                Time

## 2021-04-12 NOTE — ED PROVIDER NOTES
History  Chief Complaint   Patient presents with    Flank Pain     Pt reports right flank pain  Has a hx stone, states somewhere between 5-7mm  70-year-old male presents to the ED with complaints of right flank pain states he has a history of stone somewhere in the for to 7 mm range  Dr Brenda Pringle did call me and advised a history of the patient was consistent with kidney stone within the kidney nothing out in the ureters nothing causing hydro nephrosis or hydroureter  I did review the patient's history which does not show any kidney stone passing however 2-3 kidney stones up and within the kidney  Patient denies any other medical issues  Medical history does show history of GERD anxiety and depression  Patient states his gallbladder is gone and he has had inguinal hernia repair in the past       History provided by:  Patient   used: No        Prior to Admission Medications   Prescriptions Last Dose Informant Patient Reported? Taking?    HYDROcodone-acetaminophen (NORCO) 5-325 mg per tablet 4/12/2021 at 1300  Yes Yes   Sig: Take 1 tablet by mouth every 6 (six) hours as needed for pain   Tamsulosin HCl (FLOMAX PO)   Yes No   Sig: Take 0 4 mg by mouth daily   ketorolac (TORADOL) 10 mg tablet Not Taking at Unknown time  Yes No   Sig: Take 10 mg by mouth every 6 (six) hours as needed for moderate pain   sildenafil (VIAGRA) 100 mg tablet   Yes No   Sig: Take 100 mg by mouth daily as needed for erectile dysfunction      Facility-Administered Medications: None       Past Medical History:   Diagnosis Date    Anxiety     Depression     GERD (gastroesophageal reflux disease)     Kidney stones        Past Surgical History:   Procedure Laterality Date    ADENOIDECTOMY      CHOLECYSTECTOMY      INGUINAL HERNIA REPAIR      MD CYSTO/URETERO W/LITHOTRIPSY &INDWELL STENT INSRT Left 2/28/2021    Procedure: CYSTOSCOPY URETEROSCOPY WITH LITHOTRIPSY HOLMIUM LASER, RETROGRADE PYELOGRAM AND INSERTION STENT URETERAL;  Surgeon: Edson Pan MD;  Location: [facility];  Service: Urology    TONSILLECTOMY         Family History   Problem Relation Age of Onset    Cancer Father         prostate    Diabetes Father      I have reviewed and agree with the history as documented  E-Cigarette/Vaping    E-Cigarette Use Never User      E-Cigarette/Vaping Substances     Social History     Tobacco Use    Smoking status: Never Smoker    Smokeless tobacco: Never Used   Substance Use Topics    Alcohol use: Yes     Frequency: Monthly or less    Drug use: Never       Review of Systems   Constitutional: Negative for activity change, chills, diaphoresis and fever  HENT: Negative for congestion, ear pain, nosebleeds, sore throat, trouble swallowing and voice change  Eyes: Negative for pain, discharge and redness  Respiratory: Negative for apnea, cough, choking, shortness of breath, wheezing and stridor  Cardiovascular: Negative for chest pain and palpitations  Gastrointestinal: Negative for abdominal distention, abdominal pain, constipation, diarrhea, nausea and vomiting  Endocrine: Negative for polydipsia  Genitourinary: Positive for flank pain  Negative for difficulty urinating, dysuria, frequency, hematuria and urgency  Musculoskeletal: Negative for back pain, gait problem, joint swelling, myalgias, neck pain and neck stiffness  Skin: Negative for pallor and rash  Neurological: Negative for dizziness, tremors, syncope, speech difficulty, weakness, numbness and headaches  Hematological: Negative for adenopathy  Psychiatric/Behavioral: Negative for confusion, hallucinations, self-injury and suicidal ideas  The patient is not nervous/anxious  Physical Exam  Physical Exam  Vitals signs and nursing note reviewed  Constitutional:       General: He is not in acute distress  Appearance: He is well-developed  He is not diaphoretic  HENT:      Head: Normocephalic and atraumatic        Right Ear: External ear normal       Left Ear: External ear normal       Nose: Nose normal    Eyes:      Conjunctiva/sclera: Conjunctivae normal       Pupils: Pupils are equal, round, and reactive to light  Neck:      Musculoskeletal: Normal range of motion and neck supple  Cardiovascular:      Rate and Rhythm: Normal rate and regular rhythm  Heart sounds: Normal heart sounds  Pulmonary:      Effort: Pulmonary effort is normal       Breath sounds: Normal breath sounds  Abdominal:      General: Bowel sounds are normal       Palpations: Abdomen is soft  Musculoskeletal: Normal range of motion  Comments: Patient does have tenderness along the right lower thoracic upper lumbar paravertebral musculature  States that his pain when you push and he flinches and pulls away from where palpating  Skin:     General: Skin is warm and dry  Neurological:      Mental Status: He is alert and oriented to person, place, and time  Deep Tendon Reflexes: Reflexes are normal and symmetric  Psychiatric:         Behavior: Behavior is cooperative           Vital Signs  ED Triage Vitals   Temperature Pulse Respirations Blood Pressure SpO2   04/12/21 1603 04/12/21 1603 04/12/21 1603 04/12/21 1603 04/12/21 1603   98 6 °F (37 °C) 87 18 116/75 97 %      Temp Source Heart Rate Source Patient Position - Orthostatic VS BP Location FiO2 (%)   04/12/21 1603 04/12/21 1603 04/12/21 1819 04/12/21 1819 --   Tympanic Monitor Lying Left arm       Pain Score       04/12/21 1603       7           Vitals:    04/12/21 1603 04/12/21 1819 04/12/21 1945   BP: 116/75 151/76 131/77   Pulse: 87 76 78   Patient Position - Orthostatic VS:  Lying          Visual Acuity      ED Medications  Medications   HYDROmorphone (DILAUDID) injection 1 mg (1 mg Intravenous Not Given 4/12/21 1814)   ketorolac (TORADOL) injection 15 mg (15 mg Intravenous Given 4/12/21 1803)   sodium chloride 0 9 % bolus 1,000 mL (1,000 mL Intravenous New Bag 4/12/21 1805) ondansetron (ZOFRAN) injection 4 mg (4 mg Intravenous Given 4/12/21 1815)   iohexol (OMNIPAQUE) 350 MG/ML injection (MULTI-DOSE) 100 mL (100 mL Intravenous Given 4/12/21 1859)       Diagnostic Studies  Results Reviewed     Procedure Component Value Units Date/Time    Urine Microscopic [[id number]]  (Abnormal) Collected: 04/12/21 1817    Lab Status: Final result Specimen: Urine, Clean Catch Updated: 04/12/21 1838     RBC, UA 2-4 /hpf      WBC, UA 1-2 /hpf      Epithelial Cells Occasional /hpf      Bacteria, UA Occasional /hpf      Hyaline Casts, UA 10-20 /lpf      MUCUS THREADS Innumerable    UA (URINE) with reflex to Scope [[id number]]  (Abnormal) Collected: 04/12/21 1817    Lab Status: Final result Specimen: Urine, Clean Catch Updated: 04/12/21 1829     Color, UA Yellow     Clarity, UA Clear     Specific Gravity, UA >=1 030     pH, UA 5 5     Leukocytes, UA Negative     Nitrite, UA Negative     Protein, UA Trace mg/dl      Glucose, UA Negative mg/dl      Ketones, UA Negative mg/dl      Urobilinogen, UA 0 2 E U /dl      Bilirubin, UA Interference- unable to analyze     Blood, UA Negative    Urine culture [[id number]] Collected: 04/12/21 1817    Lab Status:  In process Specimen: Urine, Clean Catch Updated: 04/12/21 1823    Comprehensive metabolic panel [[id number]] Collected: 04/12/21 1758    Lab Status: Final result Specimen: Blood from Arm, Right Updated: 04/12/21 1819     Sodium 138 mmol/L      Potassium 3 8 mmol/L      Chloride 102 mmol/L      CO2 28 mmol/L      ANION GAP 8 mmol/L      BUN 15 mg/dL      Creatinine 1 19 mg/dL      Glucose 111 mg/dL      Calcium 8 6 mg/dL      AST 25 U/L      ALT 46 U/L      Alkaline Phosphatase 71 U/L      Total Protein 6 9 g/dL      Albumin 3 9 g/dL      Total Bilirubin 0 51 mg/dL      eGFR 68 ml/min/1 73sq m     Narrative:      Jazmin guidelines for Chronic Kidney Disease (CKD):     Stage 1 with normal or high GFR (GFR > 90 mL/min/1 73 square meters)   Stage 2 Mild CKD (GFR = 60-89 mL/min/1 73 square meters)    Stage 3A Moderate CKD (GFR = 45-59 mL/min/1 73 square meters)    Stage 3B Moderate CKD (GFR = 30-44 mL/min/1 73 square meters)    Stage 4 Severe CKD (GFR = 15-29 mL/min/1 73 square meters)    Stage 5 End Stage CKD (GFR <15 mL/min/1 73 square meters)  Note: GFR calculation is accurate only with a steady state creatinine    Lipase [[id number]]  (Normal) Collected: 04/12/21 1758    Lab Status: Final result Specimen: Blood from Arm, Right Updated: 04/12/21 1819     Lipase 105 u/L     CBC and differential [[id number]]  (Abnormal) Collected: 04/12/21 1758    Lab Status: Final result Specimen: Blood from Arm, Right Updated: 04/12/21 1803     WBC 8 80 Thousand/uL      RBC 5 03 Million/uL      Hemoglobin 15 1 g/dL      Hematocrit 45 0 %      MCV 90 fL      MCH 30 0 pg      MCHC 33 6 g/dL      RDW 11 9 %      MPV 8 8 fL      Platelets 637 Thousands/uL      nRBC 0 /100 WBCs      Neutrophils Relative 54 %      Immat GRANS % 1 %      Lymphocytes Relative 31 %      Monocytes Relative 11 %      Eosinophils Relative 2 %      Basophils Relative 1 %      Neutrophils Absolute 4 83 Thousands/µL      Immature Grans Absolute 0 10 Thousand/uL      Lymphocytes Absolute 2 76 Thousands/µL      Monocytes Absolute 0 93 Thousand/µL      Eosinophils Absolute 0 13 Thousand/µL      Basophils Absolute 0 05 Thousands/µL                  CT abdomen pelvis with contrast   Final Result by Elda Lezama MD (04/12 1940)      No acute inflammatory changes in the abdomen or pelvis  No obstructive uropathy  Workstation performed: [id number]                    Procedures  Procedures         ED Course                             SBIRT 20yo+      Most Recent Value   SBIRT (22 yo +)   In order to provide better care to our patients, we are screening all of our patients for alcohol and drug use  Would it be okay to ask you these screening questions?   Yes Filed at: 04/12/2021 8327 Initial Alcohol Screen: US AUDIT-C    1  How often do you have a drink containing alcohol? 1 Filed at: 04/12/2021 1947   2  How many drinks containing alcohol do you have on a typical day you are drinking? 0 Filed at: 04/12/2021 1947   3a  Male UNDER 65: How often do you have five or more drinks on one occasion? 0 Filed at: 04/12/2021 1947   3b  FEMALE Any Age, or MALE 65+: How often do you have 4 or more drinks on one occassion? 0 Filed at: 04/12/2021 1947   Audit-C Score  1 Filed at: 04/12/2021 1947   DIANN: How many times in the past year have you    Used an illegal drug or used a prescription medication for non-medical reasons? Never Filed at: 04/12/2021 1947                    MDM    Disposition  Final diagnoses:   Panniculitis affecting back     Time reflects when diagnosis was documented in both MDM as applicable and the Disposition within this note     Time User Action Codes Description Comment    4/12/2021  8:29 PM Júnior Whittington [M54 00] Panniculitis affecting back       ED Disposition     ED Disposition Condition Date/Time Comment    Discharge Stable Mon Apr 12, 2021  8:29 PM Rita Lewis discharge to home/self care  Follow-up Information     Follow up With Specialties Details Why [address] [facility], W[facility] Care Schedule an appointment as soon as possible for a visit  As needed [facility], [address] [place] [address]  [phone]            Patient's Medications   Discharge Prescriptions    HYDROCODONE-ACETAMINOPHEN (NORCO) 5-325 MG PER TABLET    Take 1 tablet by mouth every 6 (six) hours as needed for pain for up to 10 daysMax Daily Amount: 4 tablets       Start Date: 4/12/2021 End Date: 4/22/2021       Order Dose: 1 tablet       Quantity: 10 tablet    Refills: 0     No discharge procedures on file      PDMP Review     None          ED Provider  Electronically Signed by           Beka Hyatt DO  04/12/21 2033

## 2021-04-12 NOTE — CONSULTS
H&P Exam - Urology       Patient: Krys Lopez   : 1965 Sex: male   MRN: [id number]     CSN: [id number]      History of Present Illness   HPI:  Krys Lopez is a 63 y o  male well known to me with history of kidney stones:  My office today once again complaining of moderate to severe right back pain recently seen in the ER last week undergoing his spiral CT scan the abdomen pelvis confirming 7 mm stone in the right upper pole as well as 2 mm stone in the upper pole mid pole and lower pole no hydronephrosis hydroureter sent home patient called the office stating that he wished to have the stone still with calling me today stating that the pain is worse once again presenting back to the ER seen at this time continue to note right mid pinpoint back tenderness      Review of Systems:   Constitutional:  Negative for activity change, fever, chills and diaphoresis  HENT: Negative for hearing loss and trouble swallowing  Eyes: Negative for itching and visual disturbance  Respiratory: Negative for chest tightness and shortness of breath  Cardiovascular: Negative for chest pain, edema  Gastrointestinal: Negative for abdominal distention, na abdominal pain, constipation, diarrhea, Nausea and vomiting  Genitourinary: Negative for decreased urine volume, difficulty urinating, dysuria, enuresis, frequency, hematuria and urgency  Musculoskeletal: Negative for gait problem and myalgias  Neurological: Negative for dizziness and headaches  Hematological: Does not bruise/bleed easily         Historical Information   Past Medical History:   Diagnosis Date    Anxiety     Depression     GERD (gastroesophageal reflux disease)     Kidney stones      Past Surgical History:   Procedure Laterality Date    ADENOIDECTOMY      CHOLECYSTECTOMY      INGUINAL HERNIA REPAIR      TX CYSTO/URETERO W/LITHOTRIPSY &INDWELL STENT INSRT Left 2021    Procedure: CYSTOSCOPY URETEROSCOPY WITH LITHOTRIPSY HOLMIUM LASER, RETROGRADE PYELOGRAM AND INSERTION STENT URETERAL;  Surgeon: Joanie Jones MD;  Location: [facility];  Service: Urology    TONSILLECTOMY       Social History   Social History     Substance and Sexual Activity   Alcohol Use Yes    Frequency: Monthly or less     Social History     Substance and Sexual Activity   Drug Use Never     Social History     Tobacco Use   Smoking Status Never Smoker   Smokeless Tobacco Never Used     Family History:   Family History   Problem Relation Age of Onset    Cancer Father         prostate    Diabetes Father        Meds/Allergies   (Not in a hospital admission)    No Known Allergies    Objective   Vitals: /76 (BP Location: Left arm)   Pulse 76   Temp 98 6 °F (37 °C) (Tympanic)   Resp 18   Wt 116 kg (255 lb)   SpO2 97%   BMI 39 94 kg/m²     Physical Exam:  General Alert awake   Normocephalic atraumatic PERRLA  Lungs clear bilaterally  Cardiac normal S1 normal S2  Abdomen soft,  right mid back  Extremities no edema    No intake/output data recorded      Invasive Devices     Peripheral Intravenous Line            Peripheral IV 04/07/21 Left Antecubital 5 days    Peripheral IV 04/12/21 Right Antecubital less than 1 day          Drain            Ureteral Drain/Stent Left ureter 6 Fr  43 days                    Lab Results: CBC:   Lab Results   Component Value Date    WBC 8 80 04/12/2021    HGB 15 1 04/12/2021    HCT 45 0 04/12/2021    MCV 90 04/12/2021     04/12/2021    MCH 30 0 04/12/2021    MCHC 33 6 04/12/2021    RDW 11 9 04/12/2021    MPV 8 8 (L) 04/12/2021    NRBC 0 04/12/2021     CMP:   Lab Results   Component Value Date     04/12/2021    CO2 28 04/12/2021    CO2 28 11/19/2018    BUN 15 04/12/2021    CREATININE 1 19 04/12/2021    CALCIUM 8 6 04/12/2021    AST 25 04/12/2021    ALT 46 04/12/2021    ALKPHOS 71 04/12/2021    EGFR 68 04/12/2021     Urinalysis:   Lab Results   Component Value Date    COLORU Yellow 04/12/2021    CLARITYU Clear 04/12/2021 SPECGRAV >=1 030 04/12/2021    PHUR 5 5 04/12/2021    LEUKOCYTESUR Negative 04/12/2021    NITRITE Negative 04/12/2021    GLUCOSEU Negative 04/12/2021    KETONESU Negative 04/12/2021    BILIRUBINUR Interference- unable to analyze (A) 04/12/2021    BLOODU Negative 04/12/2021     Urine Culture:   Lab Results   Component Value Date    URINECX No Growth <1000 cfu/mL 03/16/2021     PSA: No results found for: PSA        Assessment/ Plan:  History of bilateral stones  Right back flank pain   CT scan abdomen pelvis with contrast  If scan shows no obvious ureteral colic will need spine consult neuromuscular in origin patient still wishes to have right kidney stone still with but can be done as outpatient basis no emergency at this time      Shonna Napier MD

## 2021-04-13 LAB — BACTERIA UR CULT: NORMAL

## 2021-04-15 RX ORDER — PREDNISONE 10 MG/1
20 TABLET ORAL DAILY
Qty: 10 TABLET | Refills: 0 | Status: SHIPPED | OUTPATIENT
Start: 2021-04-15 | End: 2021-04-20

## 2021-04-23 ENCOUNTER — APPOINTMENT (EMERGENCY)
Dept: RADIOLOGY | Facility: HOSPITAL | Age: 56
End: 2021-04-23
Payer: COMMERCIAL

## 2021-04-23 ENCOUNTER — HOSPITAL ENCOUNTER (EMERGENCY)
Facility: HOSPITAL | Age: 56
Discharge: HOME | End: 2021-04-23
Attending: EMERGENCY MEDICINE | Admitting: EMERGENCY MEDICINE
Payer: COMMERCIAL

## 2021-04-23 VITALS
HEART RATE: 70 BPM | TEMPERATURE: 98 F | OXYGEN SATURATION: 98 % | SYSTOLIC BLOOD PRESSURE: 119 MMHG | RESPIRATION RATE: 16 BRPM | DIASTOLIC BLOOD PRESSURE: 71 MMHG

## 2021-04-23 DIAGNOSIS — R10.9 ABDOMINAL PAIN: Primary | ICD-10-CM

## 2021-04-23 LAB
ALBUMIN SERPL BCP-MCNC: 3.5 G/DL (ref 3.5–5)
ALP SERPL-CCNC: 73 U/L (ref 46–116)
ALT SERPL W P-5'-P-CCNC: 48 U/L (ref 12–78)
ANION GAP SERPL CALCULATED.3IONS-SCNC: 11 MMOL/L (ref 4–13)
APTT PPP: 27 SECONDS (ref 23–37)
AST SERPL W P-5'-P-CCNC: 20 U/L (ref 5–45)
BACTERIA UR QL AUTO: NORMAL /HPF
BASOPHILS # BLD MANUAL: 0.08 THOUSAND/UL (ref 0–0.1)
BASOPHILS NFR MAR MANUAL: 1 % (ref 0–1)
BILIRUB SERPL-MCNC: 0.39 MG/DL (ref 0.2–1)
BILIRUB UR QL STRIP: NEGATIVE
BUN SERPL-MCNC: 16 MG/DL (ref 5–25)
CALCIUM SERPL-MCNC: 8.5 MG/DL (ref 8.3–10.1)
CHLORIDE SERPL-SCNC: 103 MMOL/L (ref 100–108)
CLARITY UR: CLEAR
CO2 SERPL-SCNC: 24 MMOL/L (ref 21–32)
COLOR UR: YELLOW
CREAT SERPL-MCNC: 1.03 MG/DL (ref 0.6–1.3)
EOSINOPHIL # BLD MANUAL: 0.08 THOUSAND/UL (ref 0–0.4)
EOSINOPHIL NFR BLD MANUAL: 1 % (ref 0–6)
ERYTHROCYTE [DISTWIDTH] IN BLOOD BY AUTOMATED COUNT: 11.8 % (ref 11.6–15.1)
GFR SERPL CREATININE-BSD FRML MDRD: 81 ML/MIN/1.73SQ M
GLUCOSE SERPL-MCNC: 108 MG/DL (ref 65–140)
GLUCOSE UR STRIP-MCNC: NEGATIVE MG/DL
HCT VFR BLD AUTO: 46.2 % (ref 36.5–49.3)
HGB BLD-MCNC: 15.6 G/DL (ref 12–17)
HGB UR QL STRIP.AUTO: ABNORMAL
INR PPP: 0.96 (ref 0.84–1.19)
KETONES UR STRIP-MCNC: NEGATIVE MG/DL
LACTATE SERPL-SCNC: 1.5 MMOL/L (ref 0.5–2)
LEUKOCYTE ESTERASE UR QL STRIP: NEGATIVE
LIPASE SERPL-CCNC: 111 U/L (ref 73–393)
LYMPHOCYTES # BLD AUTO: 2.6 THOUSAND/UL (ref 0.6–4.47)
LYMPHOCYTES # BLD AUTO: 31 % (ref 14–44)
MCH RBC QN AUTO: 30.1 PG (ref 26.8–34.3)
MCHC RBC AUTO-ENTMCNC: 33.8 G/DL (ref 31.4–37.4)
MCV RBC AUTO: 89 FL (ref 82–98)
MONOCYTES # BLD AUTO: 1.09 THOUSAND/UL (ref 0–1.22)
MONOCYTES NFR BLD: 13 % (ref 4–12)
MYELOCYTES NFR BLD MANUAL: 3 % (ref 0–1)
NEUTROPHILS # BLD MANUAL: 4.27 THOUSAND/UL (ref 1.85–7.62)
NEUTS SEG NFR BLD AUTO: 51 % (ref 43–75)
NITRITE UR QL STRIP: NEGATIVE
NON-SQ EPI CELLS URNS QL MICRO: NORMAL /HPF
NRBC BLD AUTO-RTO: 0 /100 WBCS
PH UR STRIP.AUTO: 6 [PH]
PLATELET # BLD AUTO: 290 THOUSANDS/UL (ref 149–390)
PLATELET BLD QL SMEAR: ADEQUATE
PMV BLD AUTO: 8.6 FL (ref 8.9–12.7)
POTASSIUM SERPL-SCNC: 4.2 MMOL/L (ref 3.5–5.3)
PROT SERPL-MCNC: 6.7 G/DL (ref 6.4–8.2)
PROT UR STRIP-MCNC: NEGATIVE MG/DL
PROTHROMBIN TIME: 12.7 SECONDS (ref 11.6–14.5)
RBC # BLD AUTO: 5.18 MILLION/UL (ref 3.88–5.62)
RBC #/AREA URNS AUTO: NORMAL /HPF
RBC MORPH BLD: NORMAL
SODIUM SERPL-SCNC: 138 MMOL/L (ref 136–145)
SP GR UR STRIP.AUTO: 1.02 (ref 1–1.03)
TOTAL CELLS COUNTED SPEC: 100
TROPONIN I SERPL-MCNC: <0.02 NG/ML
UROBILINOGEN UR QL STRIP.AUTO: 0.2 E.U./DL
WBC # BLD AUTO: 8.38 THOUSAND/UL (ref 4.31–10.16)
WBC #/AREA URNS AUTO: NORMAL /HPF

## 2021-04-23 PROCEDURE — 96375 TX/PRO/DX INJ NEW DRUG ADDON: CPT

## 2021-04-23 PROCEDURE — 85027 COMPLETE CBC AUTOMATED: CPT | Performed by: EMERGENCY MEDICINE

## 2021-04-23 PROCEDURE — 84484 ASSAY OF TROPONIN QUANT: CPT | Performed by: EMERGENCY MEDICINE

## 2021-04-23 PROCEDURE — 96361 HYDRATE IV INFUSION ADD-ON: CPT

## 2021-04-23 PROCEDURE — 85007 BL SMEAR W/DIFF WBC COUNT: CPT | Performed by: EMERGENCY MEDICINE

## 2021-04-23 PROCEDURE — 85610 PROTHROMBIN TIME: CPT | Performed by: EMERGENCY MEDICINE

## 2021-04-23 PROCEDURE — 81001 URINALYSIS AUTO W/SCOPE: CPT | Performed by: EMERGENCY MEDICINE

## 2021-04-23 PROCEDURE — 80053 COMPREHEN METABOLIC PANEL: CPT | Performed by: EMERGENCY MEDICINE

## 2021-04-23 PROCEDURE — 85730 THROMBOPLASTIN TIME PARTIAL: CPT | Performed by: EMERGENCY MEDICINE

## 2021-04-23 PROCEDURE — 83605 ASSAY OF LACTIC ACID: CPT | Performed by: EMERGENCY MEDICINE

## 2021-04-23 PROCEDURE — 36415 COLL VENOUS BLD VENIPUNCTURE: CPT | Performed by: EMERGENCY MEDICINE

## 2021-04-23 PROCEDURE — 93005 ELECTROCARDIOGRAM TRACING: CPT

## 2021-04-23 PROCEDURE — 99284 EMERGENCY DEPT VISIT MOD MDM: CPT | Performed by: EMERGENCY MEDICINE

## 2021-04-23 PROCEDURE — 99285 EMERGENCY DEPT VISIT HI MDM: CPT

## 2021-04-23 PROCEDURE — 83690 ASSAY OF LIPASE: CPT | Performed by: EMERGENCY MEDICINE

## 2021-04-23 PROCEDURE — 96374 THER/PROPH/DIAG INJ IV PUSH: CPT

## 2021-04-23 PROCEDURE — 74176 CT ABD & PELVIS W/O CONTRAST: CPT

## 2021-04-23 RX ORDER — HYDROMORPHONE HCL/PF 1 MG/ML
1 SYRINGE (ML) INJECTION ONCE
Status: COMPLETED | OUTPATIENT
Start: 2021-04-23 | End: 2021-04-23

## 2021-04-23 RX ORDER — KETOROLAC TROMETHAMINE 30 MG/ML
15 INJECTION, SOLUTION INTRAMUSCULAR; INTRAVENOUS ONCE
Status: COMPLETED | OUTPATIENT
Start: 2021-04-23 | End: 2021-04-23

## 2021-04-23 RX ORDER — PREDNISONE 20 MG/1
20 TABLET ORAL DAILY
Qty: 26 TABLET | Refills: 0 | Status: SHIPPED | OUTPATIENT
Start: 2021-04-23 | End: 2022-05-10

## 2021-04-23 RX ORDER — HYDROCODONE BITARTRATE AND ACETAMINOPHEN 5; 325 MG/1; MG/1
1 TABLET ORAL EVERY 6 HOURS PRN
Qty: 20 TABLET | Refills: 0 | Status: SHIPPED | OUTPATIENT
Start: 2021-04-23 | End: 2021-05-03

## 2021-04-23 RX ORDER — ONDANSETRON 2 MG/ML
4 INJECTION INTRAMUSCULAR; INTRAVENOUS ONCE
Status: COMPLETED | OUTPATIENT
Start: 2021-04-23 | End: 2021-04-23

## 2021-04-23 RX ADMIN — SODIUM CHLORIDE 1000 ML: 0.9 INJECTION, SOLUTION INTRAVENOUS at 12:00

## 2021-04-23 RX ADMIN — ONDANSETRON 4 MG: 2 INJECTION INTRAMUSCULAR; INTRAVENOUS at 11:56

## 2021-04-23 RX ADMIN — KETOROLAC TROMETHAMINE 15 MG: 30 INJECTION, SOLUTION INTRAMUSCULAR at 11:56

## 2021-04-23 RX ADMIN — HYDROMORPHONE HYDROCHLORIDE 1 MG: 1 INJECTION, SOLUTION INTRAMUSCULAR; INTRAVENOUS; SUBCUTANEOUS at 11:57

## 2021-04-23 NOTE — DISCHARGE INSTRUCTIONS
Your labs and CT scan were negative  Your pain may be due to adhesions from prior surgery  We will trial another steroid course as it seemed to work in the past and autoimmune panniculitis is a consideration, though rare  Please follow with your PCP, as well as surgery and rheumatology

## 2021-04-23 NOTE — ED PROVIDER NOTES
History  Chief Complaint   Patient presents with    Abdominal Pain     here about 11 days ago for similar pain, now more on RUQ  some nausea, no vomiting or diarrhea     55yoM hx recurrent abd/flank pains due to ureteral stones and ahesions, last surgery 2019 ex lap  +nausea no vomiting  Had a normal BM today  Seen here 11 days ago for RUQ pain and diagnosed with panniculitis  States pain had gone away but gradually has returned, requiring vicodin to control it  Currently 10/10 RUQ pain  Prior to Admission Medications   Prescriptions Last Dose Informant Patient Reported? Taking?    HYDROcodone-acetaminophen (NORCO) 5-325 mg per tablet   Yes No   Sig: Take 1 tablet by mouth every 6 (six) hours as needed for pain   HYDROcodone-acetaminophen (NORCO) 5-325 mg per tablet   No No   Sig: Take 1 tablet by mouth every 6 (six) hours as needed for pain for up to 10 daysMax Daily Amount: 4 tablets   Tamsulosin HCl (FLOMAX PO)   Yes No   Sig: Take 0 4 mg by mouth daily   ketorolac (TORADOL) 10 mg tablet   Yes No   Sig: Take 10 mg by mouth every 6 (six) hours as needed for moderate pain   sildenafil (VIAGRA) 100 mg tablet   Yes No   Sig: Take 100 mg by mouth daily as needed for erectile dysfunction      Facility-Administered Medications: None       Past Medical History:   Diagnosis Date    Anxiety     Depression     GERD (gastroesophageal reflux disease)     Kidney stones        Past Surgical History:   Procedure Laterality Date    ADENOIDECTOMY      CHOLECYSTECTOMY      INGUINAL HERNIA REPAIR      [place] CYSTO/URETERO W/LITHOTRIPSY &INDWELL STENT INSRT Left 2/28/2021    Procedure: CYSTOSCOPY URETEROSCOPY WITH LITHOTRIPSY HOLMIUM LASER, RETROGRADE PYELOGRAM AND INSERTION STENT URETERAL;  Surgeon: Joanie Jones MD;  Location: [facility];  Service: Urology    TONSILLECTOMY         Family History   Problem Relation Age of Onset    Cancer Father         prostate    Diabetes Father      I have reviewed and agree with the history as documented  E-Cigarette/Vaping    E-Cigarette Use Never User      E-Cigarette/Vaping Substances     Social History     Tobacco Use    Smoking status: Never Smoker    Smokeless tobacco: Never Used   Substance Use Topics    Alcohol use: Yes     Frequency: Monthly or less    Drug use: Never       Review of Systems   Constitutional: Negative for fever  Respiratory: Negative for cough  Gastrointestinal: Negative for abdominal pain  Musculoskeletal: Negative for back pain  Neurological: Negative for headaches  All other systems reviewed and are negative  Physical Exam  Physical Exam  Vitals signs reviewed  Constitutional:       Appearance: He is well-developed  HENT:      Head: Normocephalic and atraumatic  Right Ear: External ear normal       Left Ear: External ear normal       Nose: Nose normal  No rhinorrhea  Mouth/Throat:      Mouth: Mucous membranes are moist    Eyes:      Conjunctiva/sclera: Conjunctivae normal    Neck:      Musculoskeletal: Neck supple  Cardiovascular:      Rate and Rhythm: Normal rate and regular rhythm  Pulmonary:      Effort: Pulmonary effort is normal       Breath sounds: Normal breath sounds  No wheezing or rales  Abdominal:      Palpations: Abdomen is soft  Tenderness: There is abdominal tenderness in the right upper quadrant  There is right CVA tenderness  Musculoskeletal:      Right lower leg: No edema  Left lower leg: No edema  Skin:     General: Skin is warm and dry  Neurological:      Mental Status: He is alert and oriented to person, place, and time     Psychiatric:         Mood and Affect: Mood normal          Vital Signs  ED Triage Vitals   Temperature Pulse Respirations Blood Pressure SpO2   04/23/21 1104 04/23/21 1104 04/23/21 1104 04/23/21 1104 04/23/21 1104   98 4 °F (36 9 °C) 86 20 132/76 96 %      Temp Source Heart Rate Source Patient Position - Orthostatic VS BP Location FiO2 (%)   04/23/21 1104 04/23/21 1347 04/23/21 1104 04/23/21 1104 --   Tympanic Monitor Sitting Right arm       Pain Score       04/23/21 1104       Worst Possible Pain           Vitals:    04/23/21 1104 04/23/21 1347   BP: 132/76 119/71   Pulse: 86 70   Patient Position - Orthostatic VS: Sitting Lying         Visual Acuity      ED Medications  Medications   sodium chloride 0 9 % bolus 1,000 mL (0 mL Intravenous Stopped 4/23/21 1347)   HYDROmorphone (DILAUDID) injection 1 mg (1 mg Intravenous Given 4/23/21 1157)   ketorolac (TORADOL) injection 15 mg (15 mg Intravenous Given 4/23/21 1156)   ondansetron (ZOFRAN) injection 4 mg (4 mg Intravenous Given 4/23/21 1156)       Diagnostic Studies  Results Reviewed     Procedure Component Value Units Date/Time    Manual Differential(PHLEBS Do Not Order) [[id number]]  (Abnormal) Collected: 04/23/21 1156    Lab Status: Final result Specimen: Blood from Hand, Right Updated: 04/23/21 1253     Segmented % 51 %      Lymphocytes % 31 %      Monocytes % 13 %      Eosinophils, % 1 %      Basophils % 1 %      Myelocytes % 3 %      Absolute Neutrophils 4 27 Thousand/uL      Lymphocytes Absolute 2 60 Thousand/uL      Monocytes Absolute 1 09 Thousand/uL      Eosinophils Absolute 0 08 Thousand/uL      Basophils Absolute 0 08 Thousand/uL      Total Counted 100     RBC Morphology Normal     Platelet Estimate Adequate    CBC and differential [[id number]]  (Abnormal) Collected: 04/23/21 1156    Lab Status: Final result Specimen: Blood from Hand, Right Updated: 04/23/21 1253     WBC 8 38 Thousand/uL      RBC 5 18 Million/uL      Hemoglobin 15 6 g/dL      Hematocrit 46 2 %      MCV 89 fL      MCH 30 1 pg      MCHC 33 8 g/dL      RDW 11 8 %      MPV 8 6 fL      Platelets 532 Thousands/uL      nRBC 0 /100 WBCs     Narrative: This is an appended report  These results have been appended to a previously verified report      Troponin I [[id number]]  (Normal) Collected: 04/23/21 1156    Lab Status: Final result Specimen: Blood from Hand, Right Updated: 04/23/21 1232     Troponin I <0 02 ng/mL     Lactic acid [[id number]]  (Normal) Collected: 04/23/21 1156    Lab Status: Final result Specimen: Blood from Hand, Right Updated: 04/23/21 1231     LACTIC ACID 1 5 mmol/L     Narrative:      Result may be elevated if tourniquet was used during collection      Comprehensive metabolic panel [[id number]] Collected: 04/23/21 1156    Lab Status: Final result Specimen: Blood from Hand, Right Updated: 04/23/21 1226     Sodium 138 mmol/L      Potassium 4 2 mmol/L      Chloride 103 mmol/L      CO2 24 mmol/L      ANION GAP 11 mmol/L      BUN 16 mg/dL      Creatinine 1 03 mg/dL      Glucose 108 mg/dL      Calcium 8 5 mg/dL      AST 20 U/L      ALT 48 U/L      Alkaline Phosphatase 73 U/L      Total Protein 6 7 g/dL      Albumin 3 5 g/dL      Total Bilirubin 0 39 mg/dL      eGFR 81 ml/min/1 73sq m     Narrative:      [place] guidelines for Chronic Kidney Disease (CKD):     Stage 1 with normal or high GFR (GFR > 90 mL/min/1 73 square meters)    Stage 2 Mild CKD (GFR = 60-89 mL/min/1 73 square meters)    Stage 3A Moderate CKD (GFR = 45-59 mL/min/1 73 square meters)    Stage 3B Moderate CKD (GFR = 30-44 mL/min/1 73 square meters)    Stage 4 Severe CKD (GFR = 15-29 mL/min/1 73 square meters)    Stage 5 End Stage CKD (GFR <15 mL/min/1 73 square meters)  Note: GFR calculation is accurate only with a steady state creatinine    Lipase [[id number]]  (Normal) Collected: 04/23/21 1156    Lab Status: Final result Specimen: Blood from Hand, Right Updated: 04/23/21 1226     Lipase 111 u/L     Protime-INR [[id number]]  (Normal) Collected: 04/23/21 1156    Lab Status: Final result Specimen: Blood from Hand, Right Updated: 04/23/21 1221     Protime 12 7 seconds      INR 0 96    APTT [[id number]]  (Normal) Collected: 04/23/21 1156    Lab Status: Final result Specimen: Blood from Hand, Right Updated: 04/23/21 1221     PTT 27 seconds Urine Microscopic [[id number]]  (Normal) Collected: 04/23/21 1200    Lab Status: Final result Specimen: Urine, Clean Catch Updated: 04/23/21 1219     RBC, UA 0-1 /hpf      WBC, UA 0-1 /hpf      Epithelial Cells None Seen /hpf      Bacteria, UA None Seen /hpf     UA w Reflex to Microscopic w Reflex to Culture [[id number]]  (Abnormal) Collected: 04/23/21 1200    Lab Status: Final result Specimen: Urine, Clean Catch Updated: 04/23/21 1210     Color, UA Yellow     Clarity, UA Clear     Specific Gravity, UA 1 025     pH, UA 6 0     Leukocytes, UA Negative     Nitrite, UA Negative     Protein, UA Negative mg/dl      Glucose, UA Negative mg/dl      Ketones, UA Negative mg/dl      Urobilinogen, UA 0 2 E U /dl      Bilirubin, UA Negative     Blood, UA Trace-Intact                 CT abdomen pelvis wo contrast   Final Result by Melissa Tim MD (04/23 1302)      Nonobstructing bilateral intrarenal calculi, largest measuring 7 mm at the upper pole the right kidney  No ureteral calculi or hydronephrosis  No acute CT abnormality to definitively account for the patient's right flank pain  Colonic diverticulosis without acute diverticulitis  Workstation performed: [id number]                    Procedures  Procedures         ED Course                             SBIRT 22yo+      Most Recent Value   SBIRT (24 yo +)   In order to provide better care to our patients, we are screening all of our patients for alcohol and drug use  Would it be okay to ask you these screening questions? No Filed at: 04/23/2021 1254                    MDM  Number of Diagnoses or Management Options  Abdominal pain:   Diagnosis management comments: CT and labs wnl  Given prior dx panniculitis and pt's chronic undiagnosed skin condition, consider autoimmune ds  More likely pain is d/t adhesions  Advised f/u surgery and rheum  Pt thinks he did well on prednisone last time so will try a 2 wk taper            Disposition  Final diagnoses:   Abdominal pain     Time reflects when diagnosis was documented in both MDM as applicable and the Disposition within this note     Time User Action Codes Description Comment    4/23/2021  1:44 PM Dewayne Wagner Add [R10 9] Abdominal pain       ED Disposition     ED Disposition Condition Date/Time Comment    Discharge Stable Fri Apr 23, 2021  1:44 PM Michael Hendrickson discharge to home/self care  Follow-up Information     Follow up With Specialties Details Why Harish Zaidi MD Internal Medicine In 1 week  [address] [place] [id number]  [phone]            Patient's Medications   Discharge Prescriptions    HYDROCODONE-ACETAMINOPHEN (NORCO) 5-325 MG PER TABLET    Take 1 tablet by mouth every 6 (six) hours as needed for pain for up to 10 daysMax Daily Amount: 4 tablets       Start Date: 4/23/2021 End Date: 5/3/2021       Order Dose: 1 tablet       Quantity: 20 tablet    Refills: 0    PREDNISONE 20 MG TABLET    Take 1 tablet (20 mg total) by mouth daily 3 tabs daily x 4 days, 2 tabs daily x 4 days, 1 tab daily x 4 days, 0 5 tab daily x 4 days  Start Date: 4/23/2021 End Date: --       Order Dose: 20 mg       Quantity: 26 tablet    Refills: 0     No discharge procedures on file      PDMP Review     None          ED Provider  Electronically Signed by           Jolene Vigil DO  04/23/21 9925

## 2021-04-24 LAB
ATRIAL RATE: 72 BPM
P AXIS: 28 DEGREES
PR INTERVAL: 160 MS
QRS AXIS: -5 DEGREES
QRSD INTERVAL: 86 MS
QT INTERVAL: 392 MS
QTC INTERVAL: 429 MS
T WAVE AXIS: -6 DEGREES
VENTRICULAR RATE: 72 BPM

## 2021-04-24 PROCEDURE — 93010 ELECTROCARDIOGRAM REPORT: CPT | Performed by: INTERNAL MEDICINE

## 2021-04-26 ENCOUNTER — TRANSCRIBE ORDERS (OUTPATIENT)
Dept: LAB | Facility: HOSPITAL | Age: 56
End: 2021-04-26

## 2021-04-26 ENCOUNTER — IMMUNIZATIONS (OUTPATIENT)
Dept: FAMILY MEDICINE CLINIC | Facility: HOSPITAL | Age: 56
End: 2021-04-26

## 2021-04-26 DIAGNOSIS — Z23 ENCOUNTER FOR IMMUNIZATION: Primary | ICD-10-CM

## 2021-04-26 PROCEDURE — 91300 SARS-COV-2 / COVID-19 MRNA VACCINE (PFIZER-BIONTECH) 30 MCG: CPT

## 2021-04-26 PROCEDURE — 0001A SARS-COV-2 / COVID-19 MRNA VACCINE (PFIZER-BIONTECH) 30 MCG: CPT

## 2021-04-27 ENCOUNTER — TRANSCRIBE ORDERS (OUTPATIENT)
Dept: ADMINISTRATIVE | Facility: HOSPITAL | Age: 56
End: 2021-04-27

## 2021-04-27 ENCOUNTER — APPOINTMENT (OUTPATIENT)
Dept: LAB | Facility: HOSPITAL | Age: 56
End: 2021-04-27
Attending: INTERNAL MEDICINE
Payer: COMMERCIAL

## 2021-04-27 DIAGNOSIS — K21.9 GASTROESOPHAGEAL REFLUX DISEASE WITHOUT ESOPHAGITIS: Primary | ICD-10-CM

## 2021-04-27 DIAGNOSIS — E78.5 HYPERLIPIDEMIA, UNSPECIFIED HYPERLIPIDEMIA TYPE: ICD-10-CM

## 2021-04-27 DIAGNOSIS — N20.0 URIC ACID NEPHROLITHIASIS: ICD-10-CM

## 2021-04-27 DIAGNOSIS — K83.09 CHOLANGITIS: ICD-10-CM

## 2021-04-27 DIAGNOSIS — K21.9 GASTROESOPHAGEAL REFLUX DISEASE WITHOUT ESOPHAGITIS: ICD-10-CM

## 2021-04-27 LAB
25(OH)D3 SERPL-MCNC: 14.1 NG/ML (ref 30–100)
ALBUMIN SERPL BCP-MCNC: 4.5 G/DL (ref 3.4–4.8)
ALP SERPL-CCNC: 65.2 U/L (ref 10–129)
ALT SERPL W P-5'-P-CCNC: 30 U/L (ref 5–63)
ANION GAP SERPL CALCULATED.3IONS-SCNC: 8 MMOL/L (ref 4–13)
AST SERPL W P-5'-P-CCNC: 19 U/L (ref 15–41)
BASOPHILS # BLD MANUAL: 0 THOUSAND/UL (ref 0–0.1)
BASOPHILS NFR MAR MANUAL: 0 % (ref 0–1)
BILIRUB SERPL-MCNC: 0.48 MG/DL (ref 0.3–1.2)
BUN SERPL-MCNC: 26 MG/DL (ref 6–20)
CALCIUM SERPL-MCNC: 9.4 MG/DL (ref 8.4–10.2)
CHLORIDE SERPL-SCNC: 104 MMOL/L (ref 96–108)
CHOLEST SERPL-MCNC: 279 MG/DL
CO2 SERPL-SCNC: 27 MMOL/L (ref 22–33)
CREAT SERPL-MCNC: 1.13 MG/DL (ref 0.5–1.2)
EOSINOPHIL # BLD MANUAL: 0 THOUSAND/UL (ref 0–0.4)
EOSINOPHIL NFR BLD MANUAL: 0 % (ref 0–6)
ERYTHROCYTE [DISTWIDTH] IN BLOOD BY AUTOMATED COUNT: 12.6 % (ref 11.6–15.1)
EST. AVERAGE GLUCOSE BLD GHB EST-MCNC: 111 MG/DL
GFR SERPL CREATININE-BSD FRML MDRD: 72 ML/MIN/1.73SQ M
GLUCOSE P FAST SERPL-MCNC: 126 MG/DL (ref 70–105)
HBA1C MFR BLD: 5.5 %
HCT VFR BLD AUTO: 49.1 % (ref 36.5–49.3)
HDLC SERPL-MCNC: 55 MG/DL
HGB BLD-MCNC: 16.2 G/DL (ref 12–17)
LDLC SERPL CALC-MCNC: 202 MG/DL (ref 0–100)
LYMPHOCYTES # BLD AUTO: 0.89 THOUSAND/UL (ref 0.6–4.47)
LYMPHOCYTES # BLD AUTO: 5 % (ref 14–44)
MCH RBC QN AUTO: 29.3 PG (ref 26.8–34.3)
MCHC RBC AUTO-ENTMCNC: 33 G/DL (ref 31.4–37.4)
MCV RBC AUTO: 89 FL (ref 82–98)
MONOCYTES # BLD AUTO: 1.42 THOUSAND/UL (ref 0–1.22)
MONOCYTES NFR BLD: 8 % (ref 4–12)
NEUTROPHILS # BLD MANUAL: 15.27 THOUSAND/UL (ref 1.85–7.62)
NEUTS BAND NFR BLD MANUAL: 2 % (ref 0–8)
NEUTS SEG NFR BLD AUTO: 84 % (ref 43–75)
NONHDLC SERPL-MCNC: 224 MG/DL
PLATELET # BLD AUTO: 309 THOUSANDS/UL (ref 149–390)
PLATELET BLD QL SMEAR: ADEQUATE
PMV BLD AUTO: 9 FL (ref 8.9–12.7)
POTASSIUM SERPL-SCNC: 4.4 MMOL/L (ref 3.5–5)
PROT SERPL-MCNC: 7.6 G/DL (ref 6.4–8.3)
PSA SERPL-MCNC: 0.2 NG/ML (ref 0–4)
RBC # BLD AUTO: 5.52 MILLION/UL (ref 3.88–5.62)
RBC MORPH BLD: NORMAL
SODIUM SERPL-SCNC: 139 MMOL/L (ref 133–145)
TOTAL CELLS COUNTED SPEC: 100
TRIGL SERPL-MCNC: 110.4 MG/DL
TSH SERPL DL<=0.05 MIU/L-ACNC: 0.57 UIU/ML (ref 0.34–5.6)
VARIANT LYMPHS # BLD AUTO: 1 %
WBC # BLD AUTO: 17.76 THOUSAND/UL (ref 4.31–10.16)

## 2021-04-27 PROCEDURE — 80053 COMPREHEN METABOLIC PANEL: CPT

## 2021-04-27 PROCEDURE — 85007 BL SMEAR W/DIFF WBC COUNT: CPT

## 2021-04-27 PROCEDURE — G0103 PSA SCREENING: HCPCS

## 2021-04-27 PROCEDURE — 80061 LIPID PANEL: CPT

## 2021-04-27 PROCEDURE — 36415 COLL VENOUS BLD VENIPUNCTURE: CPT

## 2021-04-27 PROCEDURE — 84443 ASSAY THYROID STIM HORMONE: CPT

## 2021-04-27 PROCEDURE — 82306 VITAMIN D 25 HYDROXY: CPT

## 2021-04-27 PROCEDURE — 82787 IGG 1 2 3 OR 4 EACH: CPT

## 2021-04-27 PROCEDURE — 83036 HEMOGLOBIN GLYCOSYLATED A1C: CPT

## 2021-04-27 PROCEDURE — 85027 COMPLETE CBC AUTOMATED: CPT

## 2021-04-27 PROCEDURE — 82784 ASSAY IGA/IGD/IGG/IGM EACH: CPT

## 2021-04-29 ENCOUNTER — CONSULT (OUTPATIENT)
Dept: SURGERY | Facility: CLINIC | Age: 56
End: 2021-04-29
Payer: COMMERCIAL

## 2021-04-29 VITALS
BODY MASS INDEX: 39.08 KG/M2 | HEIGHT: 67 IN | HEART RATE: 105 BPM | DIASTOLIC BLOOD PRESSURE: 80 MMHG | TEMPERATURE: 96.6 F | WEIGHT: 249 LBS | SYSTOLIC BLOOD PRESSURE: 132 MMHG

## 2021-04-29 DIAGNOSIS — G89.29 ABDOMINAL PAIN, CHRONIC, RIGHT LOWER QUADRANT: Primary | ICD-10-CM

## 2021-04-29 DIAGNOSIS — R10.31 ABDOMINAL PAIN, CHRONIC, RIGHT LOWER QUADRANT: Primary | ICD-10-CM

## 2021-04-29 LAB
IGG SERPL-MCNC: 996 MG/DL (ref 603–1613)
IGG1 SER-MCNC: 569 MG/DL (ref 248–810)
IGG2 SER-MCNC: 242 MG/DL (ref 130–555)
IGG3 SER-MCNC: 41 MG/DL (ref 15–102)
IGG4 SER-MCNC: 2 MG/DL (ref 2–96)

## 2021-04-29 PROCEDURE — 99204 OFFICE O/P NEW MOD 45 MIN: CPT | Performed by: SURGERY

## 2021-04-29 PROCEDURE — 3008F BODY MASS INDEX DOCD: CPT | Performed by: SURGERY

## 2021-04-29 PROCEDURE — 1036F TOBACCO NON-USER: CPT | Performed by: SURGERY

## 2021-04-29 NOTE — PROGRESS NOTES
[address] [place] [address] [facility] History and Physical Note:    Assessment:   adhesive disease  is likely the cause of his current intra-abdominal pain complaints  He has had 2 previous laparoscopic explorations with lysis of adhesions with pain returning  Plan:    I explained to him that  further surgery  becomes more risky and less effective and will always generate adhesions  I believe behavior modification including dieting,  exercising with the goal of weight loss and daily changes to his routine in order to decrease narcotic use and allow him to perform his job is the appropriate plan and not another surgery  However, Dr Kobi Browne should see him again to  him regarding whether another surgery is indicated/possible    Chief Complaint:   "I can't stand this pain any more"    HPI    Patient reports intermittent severe right upper quadrant constant pain with some flare-ups that require narcotics  Pain is in the right upper quadrant underneath his ribs and has been there for at least 10 years  Patient has had 5  CT scans in the last 3 months  Three CT scans were abdomen and pelvis protocol and and an additional 2  scans were renal stone protocol  All scans revealed his known nephrolithiasis and no other intra-abdominal pathology or abdominal wall pathologies  There is also a lack of any significant inflammatory changes to the skin and subcutaneous tissues of his abdomen to justify the diagnosis of panniculitis  Patient's surgical history included an inguinal hernia repair as well as cholecystectomy  In July 2011, patient had similar pain in the right upper quadrant with an extensive workup that was unremarkable  At [facility], Dr Ross Holstein Latanya Ryder, MD, performed a laparoscopic exploration and lysis of adhesions for his chronic right upper abdominal pain complaints        In 2019, Dr George Stuart,  also performed a laparoscopic exploration and lysis of adhesions for this pain  PMH:  Past Medical History:   Diagnosis Date    Anxiety     Depression     GERD (gastroesophageal reflux disease)     Kidney stones        PSH:  Past Surgical History:   Procedure Laterality Date    ADENOIDECTOMY      CHOLECYSTECTOMY      INGUINAL HERNIA REPAIR      [place] CYSTO/URETERO W/LITHOTRIPSY &INDWELL STENT INSRT Left 2/28/2021    Procedure: CYSTOSCOPY URETEROSCOPY WITH LITHOTRIPSY HOLMIUM LASER, RETROGRADE PYELOGRAM AND INSERTION STENT URETERAL;  Surgeon: Makayla Contreras MD;  Location: [address] [place] [address];  Service: Urology    TONSILLECTOMY         Home Meds:  Current Outpatient Medications on File Prior to Visit   Medication Sig Dispense Refill    HYDROcodone-acetaminophen (NORCO) 5-325 mg per tablet Take 1 tablet by mouth every 6 (six) hours as needed for pain      HYDROcodone-acetaminophen (NORCO) 5-325 mg per tablet Take 1 tablet by mouth every 6 (six) hours as needed for pain for up to 10 daysMax Daily Amount: 4 tablets 20 tablet 0    ketorolac (TORADOL) 10 mg tablet Take 10 mg by mouth every 6 (six) hours as needed for moderate pain      predniSONE 20 mg tablet Take 1 tablet (20 mg total) by mouth daily 3 tabs daily x 4 days, 2 tabs daily x 4 days, 1 tab daily x 4 days, 0 5 tab daily x 4 days  26 tablet 0    sildenafil (VIAGRA) 100 mg tablet Take 100 mg by mouth daily as needed for erectile dysfunction      Tamsulosin HCl (FLOMAX PO) Take 0 4 mg by mouth daily       No current facility-administered medications on file prior to visit          Allergies:  No Known Allergies    Social Hx:  Social History     Socioeconomic History    Marital status:      Spouse name: Not on file    Number of children: Not on file    Years of education: Not on file    Highest education level: Not on file   Occupational History    Not on file   Social Needs    Financial resource strain: Not on file    Food insecurity     Worry: Not on file     Inability: Not on file   Wilda Valdez Transportation needs     Medical: Not on file     Non-medical: Not on file   Tobacco Use    Smoking status: Never Smoker    Smokeless tobacco: Never Used   Substance and Sexual Activity    Alcohol use: Yes     Frequency: Monthly or less    Drug use: Never    Sexual activity: Yes     Partners: Female   Lifestyle    Physical activity     Days per week: Not on file     Minutes per session: Not on file    Stress: Not on file   Relationships    Social connections     Talks on phone: Not on file     Gets together: Not on file     Attends Yarsanism service: Not on file     Active member of club or organization: Not on file     Attends meetings of clubs or organizations: Not on file     Relationship status: Not on file    Intimate partner violence     Fear of current or ex partner: Not on file     Emotionally abused: Not on file     Physically abused: Not on file     Forced sexual activity: Not on file   Other Topics Concern    Not on file   Social History Narrative    Not on file        Family Hx:    Family History   Problem Relation Age of Onset    Cancer Father         prostate    Diabetes Father          Review of Systems   Constitutional: Negative  Respiratory: Negative  Cardiovascular: Negative  Gastrointestinal: Negative  Genitourinary:         Nephrolithiasis  Currently being managed by Dr Trey Hines  Musculoskeletal:          History of orthopedic issues   Neurological: Negative  Hematological: Negative  Psychiatric/Behavioral:         Patient has a history of significant depression episodes  All other systems reviewed and are negative  There were no vitals taken for this visit  Physical Exam  Constitutional:       General: He is not in acute distress  Appearance: Normal appearance  Eyes:      Pupils: Pupils are equal, round, and reactive to light  Cardiovascular:      Rate and Rhythm: Normal rate and regular rhythm     Pulmonary:      Effort: Pulmonary effort is normal  No respiratory distress  Abdominal:      Comments:  Obese, soft, nondistended  Patient has moderate tenderness to palpation in the right upper quadrant and right mid abdomen towards the flank  Skin:     General: Skin is warm and dry  Capillary Refill: Capillary refill takes less than 2 seconds  Neurological:      General: No focal deficit present  Mental Status: He is alert and oriented to person, place, and time  Psychiatric:         Mood and Affect: Mood normal          Behavior: Behavior normal          Pertinent labs reviewed   I reviewed his laboratory data from his last  Visit to the emergency room  Pertinent images and available reads personally reviewed    I personally reviewed the CT scan images and CT scan reports for both CT scans performed in April  Pertinent notes reviewed   I reviewed the note by Dr April bhakta in the emergency department as well as Dr Darrel Blanco  Operative note from 2011     I have spent 60 minutes with Patient  today in which greater than 50% of this time was spent in counseling/coordination of care regarding Diagnostic results, Prognosis, Risks and benefits of tx options, Intructions for management, Patient and family education, Importance of tx compliance, Risk factor reductions, Impressions and  reviewed his CT scan results and extensively discussed his past surgeries and limited role of future surgery to treat his pain  Luis Kaba MD [address] [facility]  [phone]

## 2021-05-03 ENCOUNTER — APPOINTMENT (EMERGENCY)
Dept: CT IMAGING | Facility: HOSPITAL | Age: 56
End: 2021-05-03
Payer: COMMERCIAL

## 2021-05-03 ENCOUNTER — HOSPITAL ENCOUNTER (EMERGENCY)
Facility: HOSPITAL | Age: 56
Discharge: HOME | End: 2021-05-03
Attending: EMERGENCY MEDICINE | Admitting: EMERGENCY MEDICINE
Payer: COMMERCIAL

## 2021-05-03 VITALS
TEMPERATURE: 97.7 F | HEART RATE: 85 BPM | SYSTOLIC BLOOD PRESSURE: 102 MMHG | OXYGEN SATURATION: 97 % | RESPIRATION RATE: 26 BRPM | DIASTOLIC BLOOD PRESSURE: 86 MMHG

## 2021-05-03 DIAGNOSIS — R10.9 ABDOMINAL PAIN: Primary | ICD-10-CM

## 2021-05-03 LAB
ALBUMIN SERPL BCP-MCNC: 3.8 G/DL (ref 3.4–4.8)
ALP SERPL-CCNC: 50.6 U/L (ref 10–129)
ALT SERPL W P-5'-P-CCNC: 26 U/L (ref 5–63)
ANION GAP SERPL CALCULATED.3IONS-SCNC: 10 MMOL/L (ref 4–13)
AST SERPL W P-5'-P-CCNC: 18 U/L (ref 15–41)
BASOPHILS # BLD MANUAL: 0 THOUSAND/UL (ref 0–0.1)
BASOPHILS NFR MAR MANUAL: 0 % (ref 0–1)
BILIRUB SERPL-MCNC: 0.59 MG/DL (ref 0.3–1.2)
BNP SERPL-MCNC: 27.6 PG/ML (ref 1–100)
BUN SERPL-MCNC: 35 MG/DL (ref 6–20)
CALCIUM SERPL-MCNC: 8.8 MG/DL (ref 8.4–10.2)
CHLORIDE SERPL-SCNC: 105 MMOL/L (ref 96–108)
CO2 SERPL-SCNC: 22 MMOL/L (ref 22–33)
CREAT SERPL-MCNC: 1.14 MG/DL (ref 0.5–1.2)
EOSINOPHIL # BLD MANUAL: 0.13 THOUSAND/UL (ref 0–0.4)
EOSINOPHIL NFR BLD MANUAL: 1 % (ref 0–6)
ERYTHROCYTE [DISTWIDTH] IN BLOOD BY AUTOMATED COUNT: 12.9 % (ref 11.6–15.1)
GFR SERPL CREATININE-BSD FRML MDRD: 71 ML/MIN/1.73SQ M
GLUCOSE SERPL-MCNC: 98 MG/DL (ref 65–140)
HCT VFR BLD AUTO: 46.4 % (ref 36.5–49.3)
HGB BLD-MCNC: 15.6 G/DL (ref 12–17)
LIPASE SERPL-CCNC: 40 U/L (ref 13–60)
LYMPHOCYTES # BLD AUTO: 29 % (ref 14–44)
LYMPHOCYTES # BLD AUTO: 3.63 THOUSAND/UL (ref 0.6–4.47)
MCH RBC QN AUTO: 29.8 PG (ref 26.8–34.3)
MCHC RBC AUTO-ENTMCNC: 33.6 G/DL (ref 31.4–37.4)
MCV RBC AUTO: 89 FL (ref 82–98)
METAMYELOCYTES NFR BLD MANUAL: 1 % (ref 0–1)
MONOCYTES # BLD AUTO: 1.13 THOUSAND/UL (ref 0–1.22)
MONOCYTES NFR BLD: 9 % (ref 4–12)
NEUTROPHILS # BLD MANUAL: 7.38 THOUSAND/UL (ref 1.85–7.62)
NEUTS BAND NFR BLD MANUAL: 1 % (ref 0–8)
NEUTS SEG NFR BLD AUTO: 58 % (ref 43–75)
PLATELET # BLD AUTO: 304 THOUSANDS/UL (ref 149–390)
PLATELET BLD QL SMEAR: ADEQUATE
PMV BLD AUTO: 8.8 FL (ref 8.9–12.7)
POTASSIUM SERPL-SCNC: 3.5 MMOL/L (ref 3.5–5)
PROT SERPL-MCNC: 6.1 G/DL (ref 6.4–8.3)
RBC # BLD AUTO: 5.23 MILLION/UL (ref 3.88–5.62)
RBC MORPH BLD: NORMAL
SODIUM SERPL-SCNC: 137 MMOL/L (ref 133–145)
TOTAL CELLS COUNTED SPEC: 100
TROPONIN I SERPL-MCNC: <0.03 NG/ML (ref 0–0.07)
VARIANT LYMPHS # BLD AUTO: 1 %
WBC # BLD AUTO: 12.5 THOUSAND/UL (ref 4.31–10.16)

## 2021-05-03 PROCEDURE — 96374 THER/PROPH/DIAG INJ IV PUSH: CPT

## 2021-05-03 PROCEDURE — 84484 ASSAY OF TROPONIN QUANT: CPT | Performed by: EMERGENCY MEDICINE

## 2021-05-03 PROCEDURE — 96375 TX/PRO/DX INJ NEW DRUG ADDON: CPT

## 2021-05-03 PROCEDURE — 99285 EMERGENCY DEPT VISIT HI MDM: CPT

## 2021-05-03 PROCEDURE — 80053 COMPREHEN METABOLIC PANEL: CPT | Performed by: EMERGENCY MEDICINE

## 2021-05-03 PROCEDURE — 83690 ASSAY OF LIPASE: CPT | Performed by: EMERGENCY MEDICINE

## 2021-05-03 PROCEDURE — 74177 CT ABD & PELVIS W/CONTRAST: CPT

## 2021-05-03 PROCEDURE — G1004 CDSM NDSC: HCPCS

## 2021-05-03 PROCEDURE — 85027 COMPLETE CBC AUTOMATED: CPT | Performed by: EMERGENCY MEDICINE

## 2021-05-03 PROCEDURE — 93005 ELECTROCARDIOGRAM TRACING: CPT

## 2021-05-03 PROCEDURE — 71275 CT ANGIOGRAPHY CHEST: CPT

## 2021-05-03 PROCEDURE — 83880 ASSAY OF NATRIURETIC PEPTIDE: CPT | Performed by: EMERGENCY MEDICINE

## 2021-05-03 PROCEDURE — 85007 BL SMEAR W/DIFF WBC COUNT: CPT | Performed by: EMERGENCY MEDICINE

## 2021-05-03 PROCEDURE — 36415 COLL VENOUS BLD VENIPUNCTURE: CPT | Performed by: EMERGENCY MEDICINE

## 2021-05-03 PROCEDURE — 99285 EMERGENCY DEPT VISIT HI MDM: CPT | Performed by: EMERGENCY MEDICINE

## 2021-05-03 RX ORDER — MORPHINE SULFATE 4 MG/ML
4 INJECTION, SOLUTION INTRAMUSCULAR; INTRAVENOUS ONCE
Status: COMPLETED | OUTPATIENT
Start: 2021-05-03 | End: 2021-05-03

## 2021-05-03 RX ORDER — ONDANSETRON 2 MG/ML
4 INJECTION INTRAMUSCULAR; INTRAVENOUS ONCE
Status: COMPLETED | OUTPATIENT
Start: 2021-05-03 | End: 2021-05-03

## 2021-05-03 RX ORDER — HYDROMORPHONE HCL/PF 1 MG/ML
1 SYRINGE (ML) INJECTION ONCE
Status: COMPLETED | OUTPATIENT
Start: 2021-05-03 | End: 2021-05-03

## 2021-05-03 RX ORDER — SODIUM CHLORIDE 9 MG/ML
125 INJECTION, SOLUTION INTRAVENOUS CONTINUOUS
Status: DISCONTINUED | OUTPATIENT
Start: 2021-05-03 | End: 2021-05-03 | Stop reason: HOSPADM

## 2021-05-03 RX ADMIN — HYDROMORPHONE HYDROCHLORIDE 1 MG: 1 INJECTION, SOLUTION INTRAMUSCULAR; INTRAVENOUS; SUBCUTANEOUS at 19:01

## 2021-05-03 RX ADMIN — ONDANSETRON 4 MG: 2 INJECTION INTRAMUSCULAR; INTRAVENOUS at 18:07

## 2021-05-03 RX ADMIN — IOHEXOL 100 ML: 350 INJECTION, SOLUTION INTRAVENOUS at 19:22

## 2021-05-03 RX ADMIN — MORPHINE SULFATE 4 MG: 4 INJECTION INTRAVENOUS at 18:08

## 2021-05-04 NOTE — ED PROVIDER NOTES
History  Chief Complaint   Patient presents with    Chest Pain     pt reports chest pain for 2 hours     This is a 16-year-old male presents the emergency department complaining of chest pain and abdominal pain  As per the patient, he the pain started 2 hours ago  The patient states this pain has been similar to the pain he has been to the emergency department for in the past   He states pain is located in his right upper abdomen  It radiates up to his chest   Nothing makes it better or worse  The patient denies fevers or chills  Patient denies coughing up blood  Patient denies nausea vomiting  Patient states the pain is sharp and severe  Prior to Admission Medications   Prescriptions Last Dose Informant Patient Reported? Taking? HYDROcodone-acetaminophen (NORCO) 5-325 mg per tablet   Yes No   Sig: Take 1 tablet by mouth every 6 (six) hours as needed for pain   HYDROcodone-acetaminophen (NORCO) 5-325 mg per tablet   No No   Sig: Take 1 tablet by mouth every 6 (six) hours as needed for pain for up to 10 daysMax Daily Amount: 4 tablets   Tamsulosin HCl (FLOMAX PO)   Yes No   Sig: Take 0 4 mg by mouth daily   ketorolac (TORADOL) 10 mg tablet   Yes No   Sig: Take 10 mg by mouth every 6 (six) hours as needed for moderate pain   predniSONE 20 mg tablet   No No   Sig: Take 1 tablet (20 mg total) by mouth daily 3 tabs daily x 4 days, 2 tabs daily x 4 days, 1 tab daily x 4 days, 0 5 tab daily x 4 days     Patient not taking: Reported on 4/29/2021   sildenafil (VIAGRA) 100 mg tablet   Yes No   Sig: Take 100 mg by mouth daily as needed for erectile dysfunction      Facility-Administered Medications: None       Past Medical History:   Diagnosis Date    Anxiety     Depression     GERD (gastroesophageal reflux disease)     Kidney stones        Past Surgical History:   Procedure Laterality Date    CHOLECYSTECTOMY      INGUINAL HERNIA REPAIR Bilateral     OTHER SURGICAL HISTORY      scar tissue scraped off liver--done [address] &[facility] STENT INSRT Left 2/28/2021    Procedure: CYSTOSCOPY URETEROSCOPY WITH LITHOTRIPSY HOLMIUM LASER, RETROGRADE PYELOGRAM AND INSERTION STENT URETERAL;  Surgeon: Alec Grier MD;  Location[facility];  Service: Urology    TONSILLECTOMY AND ADENOIDECTOMY      WISDOM TOOTH EXTRACTION         Family History   Problem Relation Age of Onset    Cancer Father         prostate    Diabetes Father     Hypertension Father     Lung cancer Father      I have reviewed and agree with the history as documented  E-Cigarette/Vaping    E-Cigarette Use Never User      E-Cigarette/Vaping Substances     Social History     Tobacco Use    Smoking status: Never Smoker    Smokeless tobacco: Never Used   Substance Use Topics    Alcohol use: Yes     Frequency: Monthly or less    Drug use: Never       Review of Systems   All other systems reviewed and are negative        Physical Exam  Physical Exam  Constitutional:  Vital signs reviewed, patient appears nontoxic, appears uncomfortable  Eyes: Pupils equal round reactive to light and accommodation, extraocular muscles intact  HEENT: trachea midline, no JVD, moist mucous membranes  Respiratory: lung sounds clear throughout, no rhonchi, no rales  Cardiovascular: regular rate rhythm, no murmurs or rubs  Abdomen: soft, diffuse abdominal tenderness, nondistended, no rebound or guarding  Back: no CVA tenderness, normal inspection  Extremities: no edema, pulses equal in all 4 extremities  Neuro: awake, alert, oriented, no focal weakness  Skin: warm, dry, intact, no rashes noted     Vital Signs  ED Triage Vitals   Temperature Pulse Respirations Blood Pressure SpO2   05/03/21 1812 05/03/21 1713 05/03/21 1713 05/03/21 1713 05/03/21 1713   97 7 °F (36 5 °C) 85 (!) 26 102/86 97 %      Temp Source Heart Rate Source Patient Position - Orthostatic VS BP Location FiO2 (%)   05/03/21 1713 05/03/21 1713 -- -- --   Oral Monitor Pain Score       05/03/21 1713       Worst Possible Pain           Vitals:    05/03/21 1713   BP: 102/86   Pulse: 85         Visual Acuity      ED Medications  Medications   sodium chloride 0 9 % infusion (has no administration in time range)   ondansetron (ZOFRAN) injection 4 mg (4 mg Intravenous Given 5/3/21 1807)   morphine (PF) 4 mg/mL injection 4 mg (4 mg Intravenous Given 5/3/21 1808)   HYDROmorphone (DILAUDID) injection 1 mg (1 mg Intravenous Given 5/3/21 1901)   iohexol (OMNIPAQUE) 350 MG/ML injection (SINGLE-DOSE) 100 mL (100 mL Intravenous Given 5/3/21 1922)       Diagnostic Studies  Results Reviewed     Procedure Component Value Units Date/Time    B-Type Natriuretic Peptide [facility] ONLY) [[id number]]  (Normal) Collected: 05/03/21 1750    Lab Status: Final result Specimen: Blood from Arm, Left Updated: 05/03/21 2004     BNP 27 6 pg/mL     CBC and differential [[id number]]  (Abnormal) Collected: 05/03/21 1750    Lab Status: Final result Specimen: Blood from Arm, Left Updated: 05/03/21 1900     WBC 12 50 Thousand/uL      RBC 5 23 Million/uL      Hemoglobin 15 6 g/dL      Hematocrit 46 4 %      MCV 89 fL      MCH 29 8 pg      MCHC 33 6 g/dL      RDW 12 9 %      MPV 8 8 fL      Platelets 710 Thousands/uL     Narrative: This is an appended report  These results have been appended to a previously verified report      Manual Differential(PHLEBS Do Not Order) [[id number]]  (Abnormal) Collected: 05/03/21 1750    Lab Status: Final result Specimen: Blood from Arm, Left Updated: 05/03/21 1900     Segmented % 58 %      Bands % 1 %      Lymphocytes % 29 %      Monocytes % 9 %      Eosinophils, % 1 %      Basophils % 0 %      Metamyelocytes% 1 %      Atypical Lymphocytes % 1 %      Absolute Neutrophils 7 38 Thousand/uL      Lymphocytes Absolute 3 63 Thousand/uL      Monocytes Absolute 1 13 Thousand/uL      Eosinophils Absolute 0 13 Thousand/uL      Basophils Absolute 0 00 Thousand/uL      Total Counted 100 RBC Morphology Normal     Platelet Estimate Adequate    Troponin I [[id number]]  (Normal) Collected: 05/03/21 1750    Lab Status: Final result Specimen: Blood from Arm, Left Updated: 05/03/21 1828     Troponin I <0 03 ng/mL     CMP [[id number]]  (Abnormal) Collected: 05/03/21 1750    Lab Status: Final result Specimen: Blood from Arm, Left Updated: 05/03/21 1828     Sodium 137 mmol/L      Potassium 3 5 mmol/L      Chloride 105 mmol/L      CO2 22 mmol/L      ANION GAP 10 mmol/L      BUN 35 mg/dL      Creatinine 1 14 mg/dL      Glucose 98 mg/dL      Calcium 8 8 mg/dL      AST 18 U/L      ALT 26 U/L      Alkaline Phosphatase 50 6 U/L      Total Protein 6 1 g/dL      Albumin 3 8 g/dL      Total Bilirubin 0 59 mg/dL      eGFR 71 ml/min/1 73sq m     Narrative:      [place] guidelines for Chronic Kidney Disease (CKD):     Stage 1 with normal or high GFR (GFR > 90 mL/min/1 73 square meters)    Stage 2 Mild CKD (GFR = 60-89 mL/min/1 73 square meters)    Stage 3A Moderate CKD (GFR = 45-59 mL/min/1 73 square meters)    Stage 3B Moderate CKD (GFR = 30-44 mL/min/1 73 square meters)    Stage 4 Severe CKD (GFR = 15-29 mL/min/1 73 square meters)    Stage 5 End Stage CKD (GFR <15 mL/min/1 73 square meters)  Note: GFR calculation is accurate only with a steady state creatinine    Lipase [[id number]]  (Normal) Collected: 05/03/21 1750    Lab Status: Final result Specimen: Blood from Arm, Left Updated: 05/03/21 1828     Lipase 40 u/L     UA (URINE) with reflex to Scope [[id number]]     Lab Status: No result Specimen: Urine                  PE Study with CT Abdomen and Pelvis with contrast   Final Result by Sky Lemus MD (05/03 1942)         1  No evidence of acute pulmonary embolus, thoracic aortic aneurysm or dissection  No acute cardiopulmonary process  2   No evidence of bowel obstruction, colitis, diverticulitis or appendicitis                       Workstation performed: [id number] Procedures  Procedures         ED Course                                           MDM  Number of Diagnoses or Management Options  Abdominal pain:   Diagnosis management comments: This is a 57-year-old male who presented to the emergency department complaining of abdominal pain and chest pain  I considered PE, small-bowel obstruction, ACS, pneumonia, diverticulosis/diverticulitis  These and other diagnoses were considered  The patient had lab work significant for a normal hemoglobin a slight white count of 12  The patient had a CT scan that showed no acute abnormality  The patient felt much better after Dilaudid  He requested Dilaudid by name and requested to be discharged on Dilaudid  I felt that this was unusual and asked the patient to follow-up with surgery for further pain management  The patient did state that he had Vicodin and that he has reached his limit for the month  Amount and/or Complexity of Data Reviewed  Clinical lab tests: reviewed and ordered  Tests in the radiology section of CPT®: reviewed and ordered        Disposition  Final diagnoses:   Abdominal pain     Time reflects when diagnosis was documented in both MDM as applicable and the Disposition within this note     Time User Action Codes Description Comment    5/3/2021  8:32 PM Dannie Whittington [R10 9] Abdominal pain       ED Disposition     ED Disposition Condition Date/Time Comment    Discharge Stable Mon May 3, 2021  8:31 PM Krys Lopez discharge to home/self care              Follow-up Information     Follow up With Specialties Details Why [employer] [place], MD Internal Medicine   [address] [address]  Jovita Carrera MD General Surgery   [address] [place] [id number]            Discharge Medication List as of 5/3/2021  8:32 PM      CONTINUE these medications which have NOT CHANGED    Details   !! HYDROcodone-acetaminophen (NORCO) 5-325 mg per tablet Take 1 tablet by mouth every 6 (six) hours as needed for pain, Historical Med      !! HYDROcodone-acetaminophen (NORCO) 5-325 mg per tablet Take 1 tablet by mouth every 6 (six) hours as needed for pain for up to 10 daysMax Daily Amount: 4 tablets, Starting Fri 4/23/2021, Until Mon 5/3/2021, Normal      ketorolac (TORADOL) 10 mg tablet Take 10 mg by mouth every 6 (six) hours as needed for moderate pain, Historical Med      predniSONE 20 mg tablet Take 1 tablet (20 mg total) by mouth daily 3 tabs daily x 4 days, 2 tabs daily x 4 days, 1 tab daily x 4 days, 0 5 tab daily x 4 days  , Starting Fri 4/23/2021, Normal      sildenafil (VIAGRA) 100 mg tablet Take 100 mg by mouth daily as needed for erectile dysfunction, Historical Med      Tamsulosin HCl (FLOMAX PO) Take 0 4 mg by mouth daily, Historical Med       !! - Potential duplicate medications found  Please discuss with provider  No discharge procedures on file      PDMP Review     None          ED Provider  Electronically Signed by           Kanika Milligan DO  05/03/21 3938

## 2021-05-05 ENCOUNTER — OFFICE VISIT (OUTPATIENT)
Dept: SURGERY | Facility: CLINIC | Age: 56
End: 2021-05-05
Payer: COMMERCIAL

## 2021-05-05 VITALS
SYSTOLIC BLOOD PRESSURE: 150 MMHG | DIASTOLIC BLOOD PRESSURE: 88 MMHG | WEIGHT: 248 LBS | HEIGHT: 67 IN | RESPIRATION RATE: 18 BRPM | HEART RATE: 107 BPM | BODY MASS INDEX: 38.92 KG/M2 | TEMPERATURE: 99 F

## 2021-05-05 DIAGNOSIS — R10.11 RUQ ABDOMINAL PAIN: Primary | ICD-10-CM

## 2021-05-05 DIAGNOSIS — G89.29 ABDOMINAL PAIN, CHRONIC, RIGHT LOWER QUADRANT: ICD-10-CM

## 2021-05-05 DIAGNOSIS — Z90.49 HISTORY OF CHOLECYSTECTOMY: ICD-10-CM

## 2021-05-05 DIAGNOSIS — R10.31 ABDOMINAL PAIN, CHRONIC, RIGHT LOWER QUADRANT: ICD-10-CM

## 2021-05-05 LAB
ATRIAL RATE: 84 BPM
P AXIS: 9 DEGREES
PR INTERVAL: 141 MS
QRS AXIS: 18 DEGREES
QRSD INTERVAL: 91 MS
QT INTERVAL: 346 MS
QTC INTERVAL: 409 MS
T WAVE AXIS: 20 DEGREES
VENTRICULAR RATE: 84 BPM

## 2021-05-05 PROCEDURE — 93010 ELECTROCARDIOGRAM REPORT: CPT | Performed by: INTERNAL MEDICINE

## 2021-05-05 PROCEDURE — 99213 OFFICE O/P EST LOW 20 MIN: CPT | Performed by: SURGERY

## 2021-05-05 NOTE — PROGRESS NOTES
Assessment/Plan:  I had a long discussion with the patient I explained to him that any time we do lysis of adhesion there is no guarantee that adhesions wound form again  In fact the chances of adhesion forming are very real and common  I think by doing another lysis of adhesion the risks are very high that they could be injury to any intra-abdominal organ  I offered to him that I should refer him to see a pain management but he says that he does not want to see them as he has tried chronic pain medications in the do not work  I told him that we should do an MRI abdomen to make sure that he does not have any soft tissue liver or biliary tract problems  I will see him after the MRI has been done  No problem-specific Assessment & Plan notes found for this encounter  Diagnoses and all orders for this visit:    RUQ abdominal pain  -     MRI abdomen w contrast; Future    History of cholecystectomy  -     MRI abdomen w contrast; Future          Subjective:      Patient ID: Evelyn guardado a 63 y o  male  65-year-old male patient came here with complaints of right upper quadrant pain  The patient is well known to me and had undergone diagnostic laparoscopy with lysis of adhesion 2 years ago  He had a similar operation almost 10 years ago  The patient tells me that both the operations had given him pain relief  He says the pain started again a month ago  It is not associated with nausea or vomiting  There is no complaints of difficulty in voiding  Patient has had history of kidney stones for which has had lithotripsy as well as stent insertion  No complaints of blood in stools  The  Patient insists that he believes that lysis of adhesion will improve his pain  He has had a CT scan recently which was pretty much normal as far as GI tract was concerned  He says he takes Vicodin for pain        The following portions of the patient's history were reviewed and updated as appropriate: allergies, current medications, past family history, past medical history, past social history, past surgical history and problem list     Review of Systems   Constitutional: Negative  HENT: Negative  Eyes: Negative  Respiratory: Negative  Cardiovascular: Negative  Gastrointestinal: Positive for abdominal pain  Endocrine: Negative  Genitourinary: Negative  Musculoskeletal: Negative  Allergic/Immunologic: Negative  Neurological: Negative  Hematological: Negative  Psychiatric/Behavioral: Negative  Objective:      /88 (BP Location: Right arm, Patient Position: Sitting, Cuff Size: Adult)   Pulse (!) 107   Temp 99 °F (37 2 °C)   Resp 18   Ht 5' 7" (1 702 m)   Wt 112 kg (248 lb)   BMI 38 84 kg/m²          Physical Exam  Vitals signs reviewed  Constitutional:       Appearance: He is obese  HENT:      Mouth/Throat:      Mouth: Mucous membranes are moist    Neck:      Musculoskeletal: Normal range of motion  Cardiovascular:      Rate and Rhythm: Normal rate and regular rhythm  Pulses: Normal pulses  Pulmonary:      Effort: Pulmonary effort is normal    Abdominal:      General: Abdomen is flat  There is no distension  Palpations: Abdomen is soft  Tenderness: There is no abdominal tenderness  There is no guarding  Neurological:      Mental Status: He is alert

## 2021-05-18 ENCOUNTER — IMMUNIZATIONS (OUTPATIENT)
Dept: FAMILY MEDICINE CLINIC | Facility: HOSPITAL | Age: 56
End: 2021-05-18

## 2021-05-18 DIAGNOSIS — Z23 ENCOUNTER FOR IMMUNIZATION: Primary | ICD-10-CM

## 2021-05-18 PROCEDURE — 91300 SARS-COV-2 / COVID-19 MRNA VACCINE (PFIZER-BIONTECH) 30 MCG: CPT

## 2021-05-18 PROCEDURE — 0002A SARS-COV-2 / COVID-19 MRNA VACCINE (PFIZER-BIONTECH) 30 MCG: CPT

## 2021-06-02 ENCOUNTER — APPOINTMENT (OUTPATIENT)
Dept: LAB | Facility: HOSPITAL | Age: 56
End: 2021-06-02
Attending: SPECIALIST
Payer: COMMERCIAL

## 2021-06-02 ENCOUNTER — TRANSCRIBE ORDERS (OUTPATIENT)
Dept: ADMINISTRATIVE | Facility: HOSPITAL | Age: 56
End: 2021-06-02

## 2021-06-02 DIAGNOSIS — D72.829 LEUKOCYTOSIS, UNSPECIFIED TYPE: ICD-10-CM

## 2021-06-02 DIAGNOSIS — R35.1 NOCTURIA: Primary | ICD-10-CM

## 2021-06-02 DIAGNOSIS — D72.829 LEUKOCYTOSIS, UNSPECIFIED TYPE: Primary | ICD-10-CM

## 2021-06-02 DIAGNOSIS — R35.1 NOCTURIA: ICD-10-CM

## 2021-06-02 LAB
BASOPHILS # BLD AUTO: 0.05 THOUSANDS/ΜL (ref 0–0.1)
BASOPHILS NFR BLD AUTO: 1 % (ref 0–1)
EOSINOPHIL # BLD AUTO: 0.06 THOUSAND/ΜL (ref 0–0.61)
EOSINOPHIL NFR BLD AUTO: 1 % (ref 0–6)
ERYTHROCYTE [DISTWIDTH] IN BLOOD BY AUTOMATED COUNT: 12.4 % (ref 11.6–15.1)
HCT VFR BLD AUTO: 45.9 % (ref 36.5–49.3)
HGB BLD-MCNC: 15.7 G/DL (ref 12–17)
IMM GRANULOCYTES # BLD AUTO: 0.13 THOUSAND/UL (ref 0–0.2)
IMM GRANULOCYTES NFR BLD AUTO: 2 % (ref 0–2)
LYMPHOCYTES # BLD AUTO: 2.07 THOUSANDS/ΜL (ref 0.6–4.47)
LYMPHOCYTES NFR BLD AUTO: 27 % (ref 14–44)
MCH RBC QN AUTO: 29.6 PG (ref 26.8–34.3)
MCHC RBC AUTO-ENTMCNC: 34.2 G/DL (ref 31.4–37.4)
MCV RBC AUTO: 87 FL (ref 82–98)
MONOCYTES # BLD AUTO: 0.88 THOUSAND/ΜL (ref 0.17–1.22)
MONOCYTES NFR BLD AUTO: 12 % (ref 4–12)
NEUTROPHILS # BLD AUTO: 4.36 THOUSANDS/ΜL (ref 1.85–7.62)
NEUTS SEG NFR BLD AUTO: 57 % (ref 43–75)
PLATELET # BLD AUTO: 269 THOUSANDS/UL (ref 149–390)
PMV BLD AUTO: 8.9 FL (ref 8.9–12.7)
PSA SERPL-MCNC: 0.2 NG/ML (ref 0–4)
RBC # BLD AUTO: 5.3 MILLION/UL (ref 3.88–5.62)
WBC # BLD AUTO: 7.55 THOUSAND/UL (ref 4.31–10.16)

## 2021-06-02 PROCEDURE — 36415 COLL VENOUS BLD VENIPUNCTURE: CPT

## 2021-06-02 PROCEDURE — 85025 COMPLETE CBC W/AUTO DIFF WBC: CPT

## 2021-06-02 PROCEDURE — G0103 PSA SCREENING: HCPCS

## 2021-06-03 ENCOUNTER — HOSPITAL ENCOUNTER (OUTPATIENT)
Dept: MRI IMAGING | Facility: HOSPITAL | Age: 56
Discharge: HOME | End: 2021-06-03
Attending: SURGERY
Payer: COMMERCIAL

## 2021-06-03 DIAGNOSIS — R10.11 RUQ ABDOMINAL PAIN: ICD-10-CM

## 2021-06-03 DIAGNOSIS — Z90.49 HISTORY OF CHOLECYSTECTOMY: ICD-10-CM

## 2021-06-03 PROCEDURE — A9585 GADOBUTROL INJECTION: HCPCS | Performed by: SURGERY

## 2021-06-03 PROCEDURE — 74183 MRI ABD W/O CNTR FLWD CNTR: CPT

## 2021-06-03 RX ADMIN — GADOBUTROL 11 ML: 604.72 INJECTION INTRAVENOUS at 15:26

## 2021-06-09 ENCOUNTER — OFFICE VISIT (OUTPATIENT)
Dept: SURGERY | Facility: CLINIC | Age: 56
End: 2021-06-09

## 2021-06-09 VITALS
BODY MASS INDEX: 38.92 KG/M2 | RESPIRATION RATE: 18 BRPM | WEIGHT: 248 LBS | HEART RATE: 91 BPM | SYSTOLIC BLOOD PRESSURE: 150 MMHG | TEMPERATURE: 98.4 F | HEIGHT: 67 IN | DIASTOLIC BLOOD PRESSURE: 86 MMHG

## 2021-06-09 DIAGNOSIS — R10.11 RUQ ABDOMINAL PAIN: Primary | ICD-10-CM

## 2021-06-09 DIAGNOSIS — Z90.49 HISTORY OF CHOLECYSTECTOMY: ICD-10-CM

## 2021-06-09 PROCEDURE — 1036F TOBACCO NON-USER: CPT | Performed by: SURGERY

## 2021-06-09 PROCEDURE — 99212 OFFICE O/P EST SF 10 MIN: CPT | Performed by: SURGERY

## 2021-06-09 PROCEDURE — 3008F BODY MASS INDEX DOCD: CPT | Performed by: SURGERY

## 2021-06-09 NOTE — PROGRESS NOTES
Assessment/Plan:  I discussed the  MRI results with him  I also explained to him that doing diagnostic laparoscopy and lysis of adhesion  Does not really guarantee that he would not have recurrent adhesion formation  He verbalized understanding  Follow-up with me p r n  No problem-specific Assessment & Plan notes found for this encounter  Diagnoses and all orders for this visit:    RUQ abdominal pain    History of cholecystectomy          Subjective:      Patient ID: Jareth Zhang is a 61 y o  male  58-year-old male patient who has recurrent right upper quadrant pain came here to discuss his MRI and MRCP  He says that his pain has gotten improved  He tells me that he is not have it as intense as he had it in the past   Patient has history of cholecystectomy  He also history of gastric ulcers  He tells me that he takes omeprazole occasionally  The following portions of the patient's history were reviewed and updated as appropriate: allergies, current medications, past family history, past medical history, past social history, past surgical history and problem list     Review of Systems   All other systems reviewed and are negative  Objective:      /86 (BP Location: Right arm, Patient Position: Sitting, Cuff Size: Adult)   Pulse 91   Temp 98 4 °F (36 9 °C)   Resp 18   Ht 5' 7" (1 702 m)   Wt 112 kg (248 lb)   BMI 38 84 kg/m²          Physical Exam  Cardiovascular:      Rate and Rhythm: Normal rate and regular rhythm  Abdominal:      General: Bowel sounds are normal  There is no distension  Palpations: Abdomen is soft  There is no mass  Tenderness: There is no abdominal tenderness  Hernia: No hernia is present

## 2021-08-11 NOTE — ED NOTES
Health Maintenance Due   Topic Date Due   • DTaP/Tdap/Td Vaccine (1 - Tdap) Never done   • Shingles Vaccine (2 of 3) 09/14/2015   • Medicare Wellness Visit  11/18/2021       Patient is due for topics as listed above but is not proceeding with Immunization(s) Dtap/Tdap/Td and Shingles at this time.          OK to give boxed lunch as per MD Abram Jean, RN  03/15/21 1927

## 2021-08-21 ENCOUNTER — HOSPITAL ENCOUNTER (EMERGENCY)
Facility: HOSPITAL | Age: 56
Discharge: HOME | End: 2021-08-21
Attending: EMERGENCY MEDICINE
Payer: COMMERCIAL

## 2021-08-21 ENCOUNTER — APPOINTMENT (EMERGENCY)
Dept: CT IMAGING | Facility: HOSPITAL | Age: 56
End: 2021-08-21
Payer: COMMERCIAL

## 2021-08-21 VITALS
SYSTOLIC BLOOD PRESSURE: 135 MMHG | OXYGEN SATURATION: 99 % | HEART RATE: 68 BPM | RESPIRATION RATE: 16 BRPM | BODY MASS INDEX: 37.81 KG/M2 | TEMPERATURE: 98.1 F | WEIGHT: 241.4 LBS | DIASTOLIC BLOOD PRESSURE: 94 MMHG

## 2021-08-21 DIAGNOSIS — R10.9 RIGHT FLANK PAIN: Primary | ICD-10-CM

## 2021-08-21 LAB
ALBUMIN SERPL BCP-MCNC: 4 G/DL (ref 3.4–4.8)
ALP SERPL-CCNC: 58.5 U/L (ref 10–129)
ALT SERPL W P-5'-P-CCNC: 25 U/L (ref 5–63)
AMPHETAMINES SERPL QL SCN: NEGATIVE
ANION GAP SERPL CALCULATED.3IONS-SCNC: 9 MMOL/L (ref 4–13)
AST SERPL W P-5'-P-CCNC: 17 U/L (ref 15–41)
BARBITURATES UR QL: NEGATIVE
BASOPHILS # BLD AUTO: 0.05 THOUSANDS/ΜL (ref 0–0.1)
BASOPHILS NFR BLD AUTO: 1 % (ref 0–1)
BENZODIAZ UR QL: NEGATIVE
BILIRUB SERPL-MCNC: 0.46 MG/DL (ref 0.3–1.2)
BILIRUB UR QL STRIP: NEGATIVE
BUN SERPL-MCNC: 28 MG/DL (ref 6–20)
CALCIUM SERPL-MCNC: 8.7 MG/DL (ref 8.4–10.2)
CHLORIDE SERPL-SCNC: 102 MMOL/L (ref 96–108)
CLARITY UR: CLEAR
CO2 SERPL-SCNC: 24 MMOL/L (ref 22–33)
COCAINE UR QL: NEGATIVE
COLOR UR: YELLOW
CREAT SERPL-MCNC: 1.26 MG/DL (ref 0.5–1.2)
EOSINOPHIL # BLD AUTO: 0.08 THOUSAND/ΜL (ref 0–0.61)
EOSINOPHIL NFR BLD AUTO: 1 % (ref 0–6)
ERYTHROCYTE [DISTWIDTH] IN BLOOD BY AUTOMATED COUNT: 13.1 % (ref 11.6–15.1)
GFR SERPL CREATININE-BSD FRML MDRD: 63 ML/MIN/1.73SQ M
GLUCOSE SERPL-MCNC: 117 MG/DL (ref 65–140)
GLUCOSE UR STRIP-MCNC: NEGATIVE MG/DL
HCT VFR BLD AUTO: 44.3 % (ref 36.5–49.3)
HGB BLD-MCNC: 15.1 G/DL (ref 12–17)
HGB UR QL STRIP.AUTO: NEGATIVE
IMM GRANULOCYTES # BLD AUTO: 0.26 THOUSAND/UL (ref 0–0.2)
IMM GRANULOCYTES NFR BLD AUTO: 3 % (ref 0–2)
KETONES UR STRIP-MCNC: NEGATIVE MG/DL
LEUKOCYTE ESTERASE UR QL STRIP: NEGATIVE
LIPASE SERPL-CCNC: 38 U/L (ref 13–60)
LYMPHOCYTES # BLD AUTO: 2.24 THOUSANDS/ΜL (ref 0.6–4.47)
LYMPHOCYTES NFR BLD AUTO: 23 % (ref 14–44)
MCH RBC QN AUTO: 30.6 PG (ref 26.8–34.3)
MCHC RBC AUTO-ENTMCNC: 34.1 G/DL (ref 31.4–37.4)
MCV RBC AUTO: 90 FL (ref 82–98)
METHADONE UR QL: NEGATIVE
MONOCYTES # BLD AUTO: 0.94 THOUSAND/ΜL (ref 0.17–1.22)
MONOCYTES NFR BLD AUTO: 10 % (ref 4–12)
NEUTROPHILS # BLD AUTO: 6.23 THOUSANDS/ΜL (ref 1.85–7.62)
NEUTS SEG NFR BLD AUTO: 62 % (ref 43–75)
NITRITE UR QL STRIP: NEGATIVE
OPIATES UR QL SCN: NEGATIVE
OXYCODONE+OXYMORPHONE UR QL SCN: NEGATIVE
PCP UR QL: NEGATIVE
PH UR STRIP.AUTO: 5.5 [PH]
PLATELET # BLD AUTO: 255 THOUSANDS/UL (ref 149–390)
PMV BLD AUTO: 8.7 FL (ref 8.9–12.7)
POTASSIUM SERPL-SCNC: 4 MMOL/L (ref 3.5–5)
PROT SERPL-MCNC: 6.3 G/DL (ref 6.4–8.3)
PROT UR STRIP-MCNC: NEGATIVE MG/DL
RBC # BLD AUTO: 4.93 MILLION/UL (ref 3.88–5.62)
SODIUM SERPL-SCNC: 135 MMOL/L (ref 133–145)
SP GR UR STRIP.AUTO: >=1.03 (ref 1–1.03)
THC UR QL: NEGATIVE
UROBILINOGEN UR QL STRIP.AUTO: 0.2 E.U./DL
WBC # BLD AUTO: 9.8 THOUSAND/UL (ref 4.31–10.16)

## 2021-08-21 PROCEDURE — 83690 ASSAY OF LIPASE: CPT | Performed by: EMERGENCY MEDICINE

## 2021-08-21 PROCEDURE — 74176 CT ABD & PELVIS W/O CONTRAST: CPT

## 2021-08-21 PROCEDURE — 96374 THER/PROPH/DIAG INJ IV PUSH: CPT

## 2021-08-21 PROCEDURE — 80307 DRUG TEST PRSMV CHEM ANLYZR: CPT | Performed by: EMERGENCY MEDICINE

## 2021-08-21 PROCEDURE — 81003 URINALYSIS AUTO W/O SCOPE: CPT | Performed by: EMERGENCY MEDICINE

## 2021-08-21 PROCEDURE — 85025 COMPLETE CBC W/AUTO DIFF WBC: CPT | Performed by: EMERGENCY MEDICINE

## 2021-08-21 PROCEDURE — 80053 COMPREHEN METABOLIC PANEL: CPT | Performed by: EMERGENCY MEDICINE

## 2021-08-21 PROCEDURE — 99284 EMERGENCY DEPT VISIT MOD MDM: CPT

## 2021-08-21 PROCEDURE — 36415 COLL VENOUS BLD VENIPUNCTURE: CPT | Performed by: EMERGENCY MEDICINE

## 2021-08-21 PROCEDURE — 99284 EMERGENCY DEPT VISIT MOD MDM: CPT | Performed by: EMERGENCY MEDICINE

## 2021-08-21 PROCEDURE — 96375 TX/PRO/DX INJ NEW DRUG ADDON: CPT

## 2021-08-21 RX ORDER — KETOROLAC TROMETHAMINE 30 MG/ML
30 INJECTION, SOLUTION INTRAMUSCULAR; INTRAVENOUS ONCE
Status: COMPLETED | OUTPATIENT
Start: 2021-08-21 | End: 2021-08-21

## 2021-08-21 RX ORDER — ONDANSETRON 2 MG/ML
4 INJECTION INTRAMUSCULAR; INTRAVENOUS ONCE
Status: COMPLETED | OUTPATIENT
Start: 2021-08-21 | End: 2021-08-21

## 2021-08-21 RX ADMIN — LIDOCAINE HYDROCHLORIDE: 20 INJECTION INTRAVENOUS at 19:01

## 2021-08-21 RX ADMIN — KETOROLAC TROMETHAMINE 30 MG: 30 INJECTION, SOLUTION INTRAMUSCULAR; INTRAVENOUS at 18:22

## 2021-08-21 RX ADMIN — ONDANSETRON 4 MG: 2 INJECTION INTRAMUSCULAR; INTRAVENOUS at 18:22

## 2021-08-21 NOTE — ED PROVIDER NOTES
History  Chief Complaint   Patient presents with    Flank Pain     pt was dx with right kidney stone a couple months, started with acute pain this after noon  Denies blood in urine, fevers  Episodes of right flank pain for two weeks, has known kdiney stone on that side, more severe prolonged episode starting today at 1530  Says toradol "doesn't work" and requests dilaudid but I explained we will start with toradol  No f/c/n/v/cp/sob/dysuria/freq/hematuria  No change   Has appointment with  later this month  Prior to Admission Medications   Prescriptions Last Dose Informant Patient Reported? Taking? HYDROcodone-acetaminophen (NORCO) 5-325 mg per tablet Not Taking at Unknown time Self Yes No   Sig: Take 1 tablet by mouth every 6 (six) hours as needed for pain   Patient not taking: Reported on 8/21/2021   Tamsulosin HCl (FLOMAX PO)  Self Yes Yes   Sig: Take 0 4 mg by mouth daily   ketorolac (TORADOL) 10 mg tablet Not Taking at Unknown time Self Yes No   Sig: Take 10 mg by mouth every 6 (six) hours as needed for moderate pain   Patient not taking: Reported on 8/21/2021   predniSONE 20 mg tablet Not Taking at Unknown time Self No No   Sig: Take 1 tablet (20 mg total) by mouth daily 3 tabs daily x 4 days, 2 tabs daily x 4 days, 1 tab daily x 4 days, 0 5 tab daily x 4 days     Patient not taking: Reported on 4/29/2021   sildenafil (VIAGRA) 100 mg tablet  Self Yes Yes   Sig: Take 100 mg by mouth daily as needed for erectile dysfunction      Facility-Administered Medications: None       Past Medical History:   Diagnosis Date    Anxiety     Depression     GERD (gastroesophageal reflux disease)     Kidney stones        Past Surgical History:   Procedure Laterality Date    CHOLECYSTECTOMY      INGUINAL HERNIA REPAIR Bilateral     OTHER SURGICAL HISTORY      scar tissue scraped off liver--done at [employer] [facility] [employer] [facility] STENT INSRT Left 2/28/2021    Procedure: CYSTOSCOPY URETEROSCOPY WITH LITHOTRIPSY HOLMIUM LASER, RETROGRADE PYELOGRAM AND INSERTION STENT URETERAL;  Surgeon: Madonna Morley MD;  [facility];  Service: Urology    TONSILLECTOMY AND ADENOIDECTOMY      WISDOM TOOTH EXTRACTION         Family History   Problem Relation Age of Onset    Cancer Father         prostate    Diabetes Father     Hypertension Father     Lung cancer Father      I have reviewed and agree with the history as documented  E-Cigarette/Vaping    E-Cigarette Use Never User      E-Cigarette/Vaping Substances     Social History     Tobacco Use    Smoking status: Never Smoker    Smokeless tobacco: Never Used   Vaping Use    Vaping Use: Never used   Substance Use Topics    Alcohol use: Yes    Drug use: Never       Review of Systems   Constitutional: Negative for fever  HENT: Negative for rhinorrhea  Eyes: Negative for visual disturbance  Respiratory: Negative for shortness of breath  Cardiovascular: Negative for chest pain  Gastrointestinal: Negative for abdominal pain, diarrhea and vomiting  Endocrine: Negative for polydipsia  Genitourinary: Positive for flank pain  Negative for dysuria, frequency and hematuria  Musculoskeletal: Negative for neck stiffness  Skin: Negative for rash  Allergic/Immunologic: Negative for immunocompromised state  Neurological: Negative for speech difficulty, weakness and numbness  Psychiatric/Behavioral: Negative for suicidal ideas  Physical Exam  Physical Exam  Constitutional:       General: He is not in acute distress  HENT:      Head: Normocephalic and atraumatic  Mouth/Throat:      Pharynx: Oropharynx is clear  Eyes:      Conjunctiva/sclera: Conjunctivae normal    Cardiovascular:      Rate and Rhythm: Regular rhythm  Heart sounds: Normal heart sounds  Pulmonary:      Effort: Pulmonary effort is normal       Breath sounds: Normal breath sounds     Abdominal:      General: Bowel sounds are normal  Palpations: Abdomen is soft  There is no mass  Tenderness: There is no abdominal tenderness  There is right CVA tenderness  There is no guarding  Musculoskeletal:         General: No swelling or tenderness  Cervical back: Normal range of motion and neck supple  Right lower leg: No edema  Left lower leg: No edema  Skin:     General: Skin is warm and dry  Neurological:      General: No focal deficit present  Mental Status: He is alert and oriented to person, place, and time     Psychiatric:         Mood and Affect: Mood normal          Vital Signs  ED Triage Vitals [08/21/21 1750]   Temperature Pulse Respirations Blood Pressure SpO2   98 1 °F (36 7 °C) 74 16 139/82 98 %      Temp Source Heart Rate Source Patient Position - Orthostatic VS BP Location FiO2 (%)   Oral Monitor Lying Right arm --      Pain Score       --           Vitals:    08/21/21 1750   BP: 139/82   Pulse: 74   Patient Position - Orthostatic VS: Lying         Visual Acuity      ED Medications  Medications   ondansetron (ZOFRAN) injection 4 mg (has no administration in time range)   ketorolac (TORADOL) injection 30 mg (has no administration in time range)   lidocaine (cardiac) injection 100 mg (has no administration in time range)       Diagnostic Studies  Results Reviewed     Procedure Component Value Units Date/Time    CBC and differential [[id number]]     Lab Status: No result Specimen: Blood     Comprehensive metabolic panel [[id number]]     Lab Status: No result Specimen: Blood     Lipase [[id number]]     Lab Status: No result Specimen: Blood     UA w Reflex to Microscopic w Reflex to Culture [[id number]]     Lab Status: No result Specimen: Urine     Rapid drug screen, urine [[id number]]     Lab Status: No result Specimen: Urine                  CT renal stone study abdomen pelvis without contrast    (Results Pending)              Procedures  Procedures         ED Course                                           MDM  Number of Diagnoses or Management Options  Right flank pain  Diagnosis management comments: Right flank pain, no hydro, no ureteric stone, no cause for symptoms identified on imaging/labs/ua  DC home with pmd and gu f/u      Disposition  Final diagnoses:   None     ED Disposition     None      Follow-up Information    None         Patient's Medications   Discharge Prescriptions    No medications on file     No discharge procedures on file      PDMP Review     None          ED Provider  Electronically Signed by           Ting Israel MD  08/21/21 2028

## 2021-08-22 NOTE — ED NOTES
Pt states Lido IV drip did nothing for pt's pain and his pain is getting worse  Provider aware         Dinorah French RN  08/21/21 2025       Dinorah French RN  08/21/21 2033

## 2021-09-07 ENCOUNTER — ANESTHESIA EVENT (OUTPATIENT)
Dept: PERIOP | Facility: HOSPITAL | Age: 56
End: 2021-09-07
Payer: COMMERCIAL

## 2021-09-09 ENCOUNTER — HOSPITAL ENCOUNTER (OUTPATIENT)
Facility: HOSPITAL | Age: 56
Setting detail: OUTPATIENT SURGERY
Discharge: HOME | End: 2021-09-09
Attending: SPECIALIST | Admitting: SPECIALIST
Payer: COMMERCIAL

## 2021-09-09 ENCOUNTER — APPOINTMENT (OUTPATIENT)
Dept: RADIOLOGY | Facility: HOSPITAL | Age: 56
End: 2021-09-09
Payer: COMMERCIAL

## 2021-09-09 ENCOUNTER — ANESTHESIA (OUTPATIENT)
Dept: PERIOP | Facility: HOSPITAL | Age: 56
End: 2021-09-09
Payer: COMMERCIAL

## 2021-09-09 VITALS
HEIGHT: 67 IN | OXYGEN SATURATION: 97 % | TEMPERATURE: 97.6 F | DIASTOLIC BLOOD PRESSURE: 78 MMHG | BODY MASS INDEX: 37.89 KG/M2 | SYSTOLIC BLOOD PRESSURE: 132 MMHG | RESPIRATION RATE: 18 BRPM | WEIGHT: 241.4 LBS | HEART RATE: 75 BPM

## 2021-09-09 DIAGNOSIS — N20.1 CALCULUS OF URETER: ICD-10-CM

## 2021-09-09 PROCEDURE — 74420 UROGRAPHY RTRGR +-KUB: CPT

## 2021-09-09 PROCEDURE — 88300 SURGICAL PATH GROSS: CPT | Performed by: PATHOLOGY

## 2021-09-09 PROCEDURE — C1769 GUIDE WIRE: HCPCS | Performed by: SPECIALIST

## 2021-09-09 PROCEDURE — C2617 STENT, NON-COR, TEM W/O DEL: HCPCS | Performed by: SPECIALIST

## 2021-09-09 PROCEDURE — 82360 CALCULUS ASSAY QUANT: CPT | Performed by: SPECIALIST

## 2021-09-09 PROCEDURE — C1894 INTRO/SHEATH, NON-LASER: HCPCS | Performed by: SPECIALIST

## 2021-09-09 DEVICE — INLAY URETERAL STENT W/O GUIDEWIRE
Type: IMPLANTABLE DEVICE | Site: URETER | Status: NON-FUNCTIONAL
Brand: BARD® INLAY® URETERAL STENT
Removed: 2022-05-09

## 2021-09-09 RX ORDER — ONDANSETRON 2 MG/ML
4 INJECTION INTRAMUSCULAR; INTRAVENOUS ONCE AS NEEDED
Status: COMPLETED | OUTPATIENT
Start: 2021-09-09 | End: 2021-09-09

## 2021-09-09 RX ORDER — PROPOFOL 10 MG/ML
INJECTION, EMULSION INTRAVENOUS AS NEEDED
Status: DISCONTINUED | OUTPATIENT
Start: 2021-09-09 | End: 2021-09-09

## 2021-09-09 RX ORDER — ONDANSETRON 2 MG/ML
INJECTION INTRAMUSCULAR; INTRAVENOUS AS NEEDED
Status: DISCONTINUED | OUTPATIENT
Start: 2021-09-09 | End: 2021-09-09

## 2021-09-09 RX ORDER — KETOROLAC TROMETHAMINE 30 MG/ML
30 INJECTION, SOLUTION INTRAMUSCULAR; INTRAVENOUS ONCE
Status: COMPLETED | OUTPATIENT
Start: 2021-09-09 | End: 2021-09-09

## 2021-09-09 RX ORDER — HYDROMORPHONE HCL/PF 1 MG/ML
0.5 SYRINGE (ML) INJECTION
Status: COMPLETED | OUTPATIENT
Start: 2021-09-09 | End: 2021-09-09

## 2021-09-09 RX ORDER — SODIUM CHLORIDE, SODIUM LACTATE, POTASSIUM CHLORIDE, CALCIUM CHLORIDE 600; 310; 30; 20 MG/100ML; MG/100ML; MG/100ML; MG/100ML
100 INJECTION, SOLUTION INTRAVENOUS CONTINUOUS
Status: CANCELLED | OUTPATIENT
Start: 2021-09-09

## 2021-09-09 RX ORDER — CEFAZOLIN SODIUM 2 G/50ML
2000 SOLUTION INTRAVENOUS ONCE
Status: COMPLETED | OUTPATIENT
Start: 2021-09-09 | End: 2021-09-09

## 2021-09-09 RX ORDER — HYDROCODONE BITARTRATE AND ACETAMINOPHEN 5; 325 MG/1; MG/1
1 TABLET ORAL ONCE
Status: COMPLETED | OUTPATIENT
Start: 2021-09-09 | End: 2021-09-09

## 2021-09-09 RX ORDER — FENTANYL CITRATE/PF 50 MCG/ML
50 SYRINGE (ML) INJECTION
Status: DISCONTINUED | OUTPATIENT
Start: 2021-09-09 | End: 2021-09-09 | Stop reason: HOSPADM

## 2021-09-09 RX ORDER — DEXAMETHASONE SODIUM PHOSPHATE 10 MG/ML
INJECTION, SOLUTION INTRAMUSCULAR; INTRAVENOUS AS NEEDED
Status: DISCONTINUED | OUTPATIENT
Start: 2021-09-09 | End: 2021-09-09

## 2021-09-09 RX ORDER — FENTANYL CITRATE 50 UG/ML
INJECTION, SOLUTION INTRAMUSCULAR; INTRAVENOUS AS NEEDED
Status: DISCONTINUED | OUTPATIENT
Start: 2021-09-09 | End: 2021-09-09

## 2021-09-09 RX ORDER — MIDAZOLAM HYDROCHLORIDE 2 MG/2ML
INJECTION, SOLUTION INTRAMUSCULAR; INTRAVENOUS AS NEEDED
Status: DISCONTINUED | OUTPATIENT
Start: 2021-09-09 | End: 2021-09-09

## 2021-09-09 RX ORDER — MAGNESIUM HYDROXIDE 1200 MG/15ML
LIQUID ORAL AS NEEDED
Status: DISCONTINUED | OUTPATIENT
Start: 2021-09-09 | End: 2021-09-09 | Stop reason: HOSPADM

## 2021-09-09 RX ORDER — LIDOCAINE HYDROCHLORIDE 10 MG/ML
INJECTION, SOLUTION EPIDURAL; INFILTRATION; INTRACAUDAL; PERINEURAL AS NEEDED
Status: DISCONTINUED | OUTPATIENT
Start: 2021-09-09 | End: 2021-09-09

## 2021-09-09 RX ORDER — SODIUM CHLORIDE, SODIUM LACTATE, POTASSIUM CHLORIDE, CALCIUM CHLORIDE 600; 310; 30; 20 MG/100ML; MG/100ML; MG/100ML; MG/100ML
125 INJECTION, SOLUTION INTRAVENOUS CONTINUOUS
Status: DISCONTINUED | OUTPATIENT
Start: 2021-09-09 | End: 2021-09-09 | Stop reason: HOSPADM

## 2021-09-09 RX ADMIN — HYDROMORPHONE HYDROCHLORIDE 0.5 MG: 1 INJECTION, SOLUTION INTRAMUSCULAR; INTRAVENOUS; SUBCUTANEOUS at 13:01

## 2021-09-09 RX ADMIN — FENTANYL CITRATE 50 MCG: 50 INJECTION, SOLUTION INTRAMUSCULAR; INTRAVENOUS at 10:43

## 2021-09-09 RX ADMIN — LIDOCAINE HYDROCHLORIDE 50 MG: 10 INJECTION, SOLUTION EPIDURAL; INFILTRATION; INTRACAUDAL; PERINEURAL at 10:34

## 2021-09-09 RX ADMIN — HYDROMORPHONE HYDROCHLORIDE 0.5 MG: 1 INJECTION, SOLUTION INTRAMUSCULAR; INTRAVENOUS; SUBCUTANEOUS at 12:39

## 2021-09-09 RX ADMIN — SODIUM CHLORIDE, SODIUM LACTATE, POTASSIUM CHLORIDE, AND CALCIUM CHLORIDE: .6; .31; .03; .02 INJECTION, SOLUTION INTRAVENOUS at 10:28

## 2021-09-09 RX ADMIN — CEFAZOLIN SODIUM 2000 MG: 2 SOLUTION INTRAVENOUS at 10:40

## 2021-09-09 RX ADMIN — HYDROMORPHONE HYDROCHLORIDE 0.5 MG: 1 INJECTION, SOLUTION INTRAMUSCULAR; INTRAVENOUS; SUBCUTANEOUS at 12:50

## 2021-09-09 RX ADMIN — FENTANYL CITRATE 50 MCG: 50 INJECTION, SOLUTION INTRAMUSCULAR; INTRAVENOUS at 12:19

## 2021-09-09 RX ADMIN — ONDANSETRON 4 MG: 2 INJECTION INTRAMUSCULAR; INTRAVENOUS at 13:10

## 2021-09-09 RX ADMIN — HYDROMORPHONE HYDROCHLORIDE 0.5 MG: 1 INJECTION, SOLUTION INTRAMUSCULAR; INTRAVENOUS; SUBCUTANEOUS at 12:33

## 2021-09-09 RX ADMIN — DEXAMETHASONE SODIUM PHOSPHATE 4 MG: 10 INJECTION, SOLUTION INTRAMUSCULAR; INTRAVENOUS at 10:41

## 2021-09-09 RX ADMIN — ONDANSETRON 4 MG: 2 INJECTION INTRAMUSCULAR; INTRAVENOUS at 10:41

## 2021-09-09 RX ADMIN — HYDROCODONE BITARTRATE AND ACETAMINOPHEN 1 TABLET: 5; 325 TABLET ORAL at 13:36

## 2021-09-09 RX ADMIN — KETOROLAC TROMETHAMINE 30 MG: 30 INJECTION, SOLUTION INTRAMUSCULAR at 13:23

## 2021-09-09 RX ADMIN — FENTANYL CITRATE 50 MCG: 50 INJECTION, SOLUTION INTRAMUSCULAR; INTRAVENOUS at 10:37

## 2021-09-09 RX ADMIN — PROPOFOL 200 MG: 10 INJECTION, EMULSION INTRAVENOUS at 10:34

## 2021-09-09 RX ADMIN — FENTANYL CITRATE 50 MCG: 50 INJECTION, SOLUTION INTRAMUSCULAR; INTRAVENOUS at 12:09

## 2021-09-09 RX ADMIN — MIDAZOLAM 2 MG: 1 INJECTION INTRAMUSCULAR; INTRAVENOUS at 10:28

## 2021-09-09 NOTE — ANESTHESIA PREPROCEDURE EVALUATION
Procedure:  CYSTOSCOPY, URETEROSCOPY, LASER, AND STENT (Right Bladder)    Relevant Problems   ANESTHESIA (within normal limits)      CARDIO (within normal limits)      GI/HEPATIC   (+) Gastroesophageal reflux disease   (+) Hepatic steatosis   (+) Rectal bleed      /RENAL   (+) Nephrolithiasis      NEURO/PSYCH   (+) Generalized anxiety disorder   (+) Severe episode of recurrent major depressive disorder, without psychotic features (HCC)        Physical Exam    Airway    Mallampati score: III  TM Distance: >3 FB  Neck ROM: full     Dental   No notable dental hx     Cardiovascular  Rhythm: regular, Rate: normal,     Pulmonary      Other Findings        Anesthesia Plan  ASA Score- 2     Anesthesia Type- general with ASA Monitors  Additional Monitors:   Airway Plan:           Plan Factors-Exercise tolerance (METS): >4 METS  Chart reviewed  EKG reviewed  Existing labs reviewed  Patient summary reviewed  Patient is not a current smoker  Induction- intravenous  Postoperative Plan- Plan for postoperative opioid use  Planned trial extubation    Informed Consent- Anesthetic plan and risks discussed with patient  I personally reviewed this patient with the CRNA  Discussed and agreed on the Anesthesia Plan with the CRNA  Misty Roberts

## 2021-09-09 NOTE — ANESTHESIA POSTPROCEDURE EVALUATION
Post-Op Assessment Note    CV Status:  Stable  Pain Score: 0    Pain management: adequate     Mental Status:  Sleepy   PONV Controlled:  None   Airway Patency:  Patent   Two or more mitigation strategies used for obstructive sleep apnea  There is a medical reason for not screening for obstructive sleep apnea and/or for not using two or more mitigation strategies   Post Op Vitals Reviewed: Yes      Staff: CRNA         No complications documented      BP   127/83   Temp 97   Pulse 65   Resp 16   SpO2 100

## 2021-10-04 LAB
COLOR STONE: NORMAL
COM MFR STONE: 100 %
COMMENT-STONE3: NORMAL
COMPOSITION: NORMAL
LABORATORY COMMENT REPORT: NORMAL
PHOTO: NORMAL
SIZE STONE: NORMAL MM
SPEC SOURCE SUBJ: NORMAL
STONE ANALYSIS-IMP: NORMAL
WT STONE: 164 MG

## 2021-11-19 ENCOUNTER — PREP FOR PROCEDURE (OUTPATIENT)
Dept: GASTROENTEROLOGY | Facility: CLINIC | Age: 56
End: 2021-11-19

## 2021-11-19 ENCOUNTER — TELEPHONE (OUTPATIENT)
Dept: GASTROENTEROLOGY | Facility: CLINIC | Age: 56
End: 2021-11-19

## 2021-11-19 DIAGNOSIS — Z86.010 HISTORY OF COLON POLYPS: Primary | ICD-10-CM

## 2021-12-07 ENCOUNTER — APPOINTMENT (OUTPATIENT)
Dept: LAB | Facility: HOSPITAL | Age: 56
End: 2021-12-07
Attending: SPECIALIST
Payer: COMMERCIAL

## 2021-12-07 DIAGNOSIS — N20.0 URIC ACID NEPHROLITHIASIS: ICD-10-CM

## 2021-12-07 PROCEDURE — 84560 ASSAY OF URINE/URIC ACID: CPT

## 2021-12-07 PROCEDURE — 83835 ASSAY OF METANEPHRINES: CPT

## 2021-12-07 PROCEDURE — 84300 ASSAY OF URINE SODIUM: CPT

## 2021-12-07 PROCEDURE — 82507 ASSAY OF CITRATE: CPT

## 2021-12-07 PROCEDURE — 82340 ASSAY OF CALCIUM IN URINE: CPT

## 2021-12-08 LAB
CALCIUM 24H UR-MCNC: 216 MG/24 HRS (ref 42–353)
PERIOD: 24 HOURS
PERIOD: 24 HOURS
SODIUM 24H UR-SRATE: 241.5 MMOL/24 HRS (ref 40–220)
SPECIMEN VOL UR: 1500 ML
URATE 24H UR-MCNC: 849 MG/24 HRS (ref 150–990)

## 2021-12-09 ENCOUNTER — HOSPITAL ENCOUNTER (OUTPATIENT)
Dept: RADIOLOGY | Facility: HOSPITAL | Age: 56
Discharge: HOME | End: 2021-12-09
Attending: SPECIALIST
Payer: COMMERCIAL

## 2021-12-09 DIAGNOSIS — N20.0 RENAL CALCULUS: ICD-10-CM

## 2021-12-09 PROCEDURE — 74018 RADEX ABDOMEN 1 VIEW: CPT

## 2021-12-09 PROCEDURE — 76770 US EXAM ABDO BACK WALL COMP: CPT

## 2021-12-10 LAB
CITRATE 24H UR-MCNC: 330 MG/L
CITRATE 24H UR-MRATE: 495 MG/24 HR (ref 320–1240)

## 2021-12-14 LAB
METANEPH 24H UR-MRATE: 89 UG/24 HR (ref 58–276)
METANEPHS 24H UR-MCNC: 59 UG/L
NORMETANEPHRINE 24H UR-MCNC: 233 UG/L
NORMETANEPHRINE 24H UR-MRATE: 350 UG/24 HR (ref 156–729)

## 2022-01-10 ENCOUNTER — HOSPITAL ENCOUNTER (OUTPATIENT)
Dept: RADIOLOGY | Age: 57
Discharge: HOME | End: 2022-01-10
Payer: COMMERCIAL

## 2022-01-10 DIAGNOSIS — M48.061 SPINAL STENOSIS, LUMBAR REGION, WITHOUT NEUROGENIC CLAUDICATION: ICD-10-CM

## 2022-01-10 DIAGNOSIS — M54.16 LUMBAR RADICULOPATHY: ICD-10-CM

## 2022-01-10 DIAGNOSIS — M51.26 RUPTURED LUMBAR DISC: ICD-10-CM

## 2022-01-10 PROCEDURE — 72148 MRI LUMBAR SPINE W/O DYE: CPT

## 2022-01-10 PROCEDURE — G1004 CDSM NDSC: HCPCS

## 2022-02-18 ENCOUNTER — TELEPHONE (OUTPATIENT)
Dept: GASTROENTEROLOGY | Facility: HOSPITAL | Age: 57
End: 2022-02-18

## 2022-02-21 ENCOUNTER — ANESTHESIA (OUTPATIENT)
Dept: GASTROENTEROLOGY | Facility: HOSPITAL | Age: 57
End: 2022-02-21

## 2022-02-21 ENCOUNTER — HOSPITAL ENCOUNTER (OUTPATIENT)
Dept: GASTROENTEROLOGY | Facility: HOSPITAL | Age: 57
Setting detail: OUTPATIENT SURGERY
Discharge: HOME | End: 2022-02-21
Attending: INTERNAL MEDICINE | Admitting: INTERNAL MEDICINE
Payer: COMMERCIAL

## 2022-02-21 ENCOUNTER — ANESTHESIA EVENT (OUTPATIENT)
Dept: GASTROENTEROLOGY | Facility: HOSPITAL | Age: 57
End: 2022-02-21

## 2022-02-21 VITALS
BODY MASS INDEX: 36.73 KG/M2 | HEART RATE: 93 BPM | RESPIRATION RATE: 18 BRPM | HEIGHT: 67 IN | TEMPERATURE: 97.9 F | DIASTOLIC BLOOD PRESSURE: 68 MMHG | OXYGEN SATURATION: 96 % | WEIGHT: 234 LBS | SYSTOLIC BLOOD PRESSURE: 114 MMHG

## 2022-02-21 DIAGNOSIS — Z86.010 HISTORY OF COLON POLYPS: ICD-10-CM

## 2022-02-21 PROCEDURE — G0105 COLORECTAL SCRN; HI RISK IND: HCPCS | Performed by: INTERNAL MEDICINE

## 2022-02-21 RX ORDER — LIDOCAINE HYDROCHLORIDE 20 MG/ML
INJECTION, SOLUTION EPIDURAL; INFILTRATION; INTRACAUDAL; PERINEURAL AS NEEDED
Status: DISCONTINUED | OUTPATIENT
Start: 2022-02-21 | End: 2022-02-21

## 2022-02-21 RX ORDER — SODIUM CHLORIDE 9 MG/ML
INJECTION, SOLUTION INTRAVENOUS CONTINUOUS PRN
Status: DISCONTINUED | OUTPATIENT
Start: 2022-02-21 | End: 2022-02-21

## 2022-02-21 RX ORDER — PROPOFOL 10 MG/ML
INJECTION, EMULSION INTRAVENOUS AS NEEDED
Status: DISCONTINUED | OUTPATIENT
Start: 2022-02-21 | End: 2022-02-21

## 2022-02-21 RX ORDER — PROPOFOL 10 MG/ML
INJECTION, EMULSION INTRAVENOUS CONTINUOUS PRN
Status: DISCONTINUED | OUTPATIENT
Start: 2022-02-21 | End: 2022-02-21

## 2022-02-21 RX ADMIN — Medication 40 MG: at 09:38

## 2022-02-21 RX ADMIN — SODIUM CHLORIDE: 0.9 INJECTION, SOLUTION INTRAVENOUS at 08:50

## 2022-02-21 RX ADMIN — LIDOCAINE HYDROCHLORIDE 50 MG: 20 INJECTION, SOLUTION EPIDURAL; INFILTRATION; INTRACAUDAL; PERINEURAL at 09:27

## 2022-02-21 RX ADMIN — PROPOFOL 100 MG: 10 INJECTION, EMULSION INTRAVENOUS at 09:34

## 2022-02-21 RX ADMIN — PROPOFOL 100 MCG/KG/MIN: 10 INJECTION, EMULSION INTRAVENOUS at 09:34

## 2022-02-21 NOTE — ANESTHESIA POSTPROCEDURE EVALUATION
Post-Op Assessment Note    CV Status:  Stable  Pain Score: 0    Pain management: adequate     Mental Status:  Alert and awake   Hydration Status:  Euvolemic   PONV Controlled:  Controlled   Airway Patency:  Patent      Post Op Vitals Reviewed: Yes      Staff: CRNA, Anesthesiologist         No complications documented      BP 90/53 (02/21/22 0951)    Temp 97 9 °F (36 6 °C) (02/21/22 0951)    Pulse 87 (02/21/22 0951)   Resp 13 (02/21/22 0951)    SpO2 98 % (02/21/22 0951)

## 2022-02-21 NOTE — H&P
History and Physical - [facility]  Glendy Wallace 61 y o  male MRN: [id number]                  HPI: Glendy Wallace is a 61y o  year old male who presents for screening colonoscopy  Last colonoscopy was about five years ago  History of kidney stone  REVIEW OF SYSTEMS: Per the HPI, and otherwise unremarkable      Historical Information   Past Medical History:   Diagnosis Date    Anxiety     Depression     GERD (gastroesophageal reflux disease)     Kidney stones      Past Surgical History:   Procedure Laterality Date    CHOLECYSTECTOMY      CYSTOSCOPY      FL RETROGRADE PYELOGRAM  9/9/2021    INGUINAL HERNIA REPAIR Bilateral     OTHER SURGICAL HISTORY      scar tissue scraped off liver--done at [address] &[facility] STENT INSRT Left 2/28/2021    Procedure: CYSTOSCOPY URETEROSCOPY WITH LITHOTRIPSY HOLMIUM LASER, RETROGRADE PYELOGRAM AND INSERTION STENT URETERAL;  Surgeon: Antoinette Estevez MD;  Location: [facility];  Service: Urology    AR CYSTO/URETERO W/LITHOTRIPSY &INDWELL STENT INSRT Right 9/9/2021    Procedure: CYSTOSCOPY, RIGHT URETEROSCOPY,  RIGHT RETROGRADE, HOLMIUM LASER, STONE BASKET AND RIGHT URETERAL STENT PLACEMENT;  Surgeon: Antoinette Estevez MD;  Location: [facility];  Service: Urology    TONSILLECTOMY AND ADENOIDECTOMY      WISDOM TOOTH EXTRACTION       Social History   Social History     Substance and Sexual Activity   Alcohol Use Yes     Social History     Substance and Sexual Activity   Drug Use Never     Social History     Tobacco Use   Smoking Status Never Smoker   Smokeless Tobacco Never Used     Family History   Problem Relation Age of Onset    Cancer Father         prostate    Diabetes Father     Hypertension Father     Lung cancer Father        Meds/Allergies       Current Outpatient Medications:     sildenafil (VIAGRA) 100 mg tablet    Tamsulosin HCl (FLOMAX PO)    HYDROcodone-acetaminophen (NORCO) 5-325 mg per tablet    ketorolac (TORADOL) 10 mg tablet    predniSONE 20 mg tablet  No current facility-administered medications for this encounter  Facility-Administered Medications Ordered in Other Encounters:     lidocaine (PF) (XYLOCAINE-MPF) 2 % injection, , Intravenous, PRN, 50 mg at 02/21/22 6997    sodium chloride 0 9 % infusion, , Intravenous, Continuous PRN, New Bag at 02/21/22 0850    No Known Allergies    Objective     /74   Pulse 101   Temp 97 6 °F (36 4 °C) (Temporal)   Resp 16   Ht 5' 7" (1 702 m)   Wt 106 kg (234 lb)   SpO2 98%   BMI 36 65 kg/m²       PHYSICAL EXAM    Gen: NAD  Head: NCAT  CV: RRR  CHEST: Clear  ABD: soft, NT/ND  EXT: no edema      ASSESSMENT/PLAN:  This is a 61y o  year old male here for colonoscopy, and he is stable and optimized for his procedure

## 2022-02-21 NOTE — DISCHARGE SUMMARY
Discharge Summary - Johnna Angel 61 y o  male MRN: [id number]    Unit/Bed#:  Encounter: [id number]    Admission Date:  02/21/2022    Admitting Diagnosis: History of colon polyps [Z86 010]    HPI:  History of colon polyp  Colonoscopy done  Procedures Performed: No orders of the defined types were placed in this encounter  Summary of Hospital Course: Tolerated procedure well    Significant Findings, Care, Treatment and Services Provided:  Left-sided diverticulosis noted  Recurrent polyp not seen  Complications:  None    Discharge Diagnosis:  See above    Medical Problems             Resolved Problems  Date Reviewed: 6/9/2021    None                Condition at Discharge: good         Discharge instructions/Information to patient and family:   See after visit summary for information provided to patient and family  Provisions for Follow-Up Care:  See after visit summary for information related to follow-up care and any pertinent home health orders        PCP: Esa Barker MD    Disposition: Home

## 2022-02-21 NOTE — DISCHARGE INSTRUCTIONS
Colonoscopy   WHAT YOU NEED TO KNOW:   A colonoscopy is a procedure to examine the inside of your colon (intestine) with a scope  Polyps or tissue growths may have been removed during your colonoscopy  It is normal to feel bloated and to have some abdominal discomfort  You should be passing gas  If you have hemorrhoids or you had polyps removed, you may have a small amount of bleeding  DISCHARGE INSTRUCTIONS:   Seek care immediately if:   · You have a large amount of bright red blood in your bowel movements  · Your abdomen is hard and firm and you have severe pain  · You have sudden trouble breathing  Call your doctor if:   · You develop a rash or hives  · You have a fever within 24 hours of your procedure  · You have nausea and vomiting  · You feel anesthesia effects greater than 24 hours  · You have not had a bowel movement for 3 days after your procedure  · You have questions or concerns about your condition or care  After your colonoscopy:   · Do not lift, strain, or run  until your healthcare provider says it is okay  · Rest as much as possible  You have been given medicine to relax you  Do not  drive or make important decisions for at least 24 hours  Return to your normal activity as directed  · Relieve gas and discomfort from bloating  by lying on your left side with a heating pad on your abdomen  You may need to take short walks to help the gas move out  Eat small meals until bloating is relieved  If you had polyps removed: For 7 days after your procedure:  · Do not  take aspirin  · Do not  go on long car rides  Help prevent constipation:   · Eat a variety of healthy foods  Healthy foods include fruit, vegetables, whole-grain breads, low-fat dairy products, beans, lean meat, and fish  Ask if you need to be on a special diet  Your healthcare provider may recommend that you eat high-fiber foods such as cooked beans   Fiber helps you have regular bowel movements  · Drink liquids as directed  Adults should drink between 9 and 13 eight-ounce cups of liquid every day  Ask what amount is best for you  For most people, good liquids to drink are water, juice, and milk  · Exercise as directed  Talk to your healthcare provider about the best exercise plan for you  Exercise can help prevent constipation, decrease your blood pressure and improve your health  Follow up with your doctor as directed:  Write down your questions so you remember to ask them during your visits  © Copyright [employer] 2021 Information is for End User's use only and may not be sold, redistributed or otherwise used for commercial purposes  All illustrations and images included in CareNotes® are the copyrighted property of A D A M , Inc  or [facility] Mehul Vela   The above information is an  only  It is not intended as medical advice for individual conditions or treatments  Talk to your doctor, nurse or pharmacist before following any medical regimen to see if it is safe and effective for you

## 2022-02-21 NOTE — ANESTHESIA PREPROCEDURE EVALUATION
Procedure:  COLONOSCOPY    Relevant Problems   GI/HEPATIC   (+) Gastroesophageal reflux disease   (+) Hepatic steatosis   (+) Rectal bleed      /RENAL   (+) Nephrolithiasis      NEURO/PSYCH   (+) Generalized anxiety disorder   (+) Severe episode of recurrent major depressive disorder, without psychotic features (HCC)        Physical Exam    Airway    Mallampati score: II  TM Distance: >3 FB  Neck ROM: full     Dental   No notable dental hx     Cardiovascular  Cardiovascular exam normal    Pulmonary  Pulmonary exam normal     Other Findings        Anesthesia Plan  ASA Score- 2     Anesthesia Type- IV sedation with anesthesia with ASA Monitors  Additional Monitors:   Airway Plan:           Plan Factors-Exercise tolerance (METS): >4 METS  Chart reviewed  Imaging results reviewed  Existing labs reviewed  Patient summary reviewed  Patient is not a current smoker  Obstructive sleep apnea risk education given perioperatively  Induction-     Postoperative Plan-     Informed Consent- Anesthetic plan and risks discussed with patient  I personally reviewed this patient with the CRNA  Discussed and agreed on the Anesthesia Plan with the JADEN Gasca

## 2022-05-06 RX ORDER — MULTIVITAMIN
1 TABLET ORAL DAILY
COMMUNITY

## 2022-05-06 NOTE — PRE-PROCEDURE INSTRUCTIONS
My Surgical Experience    The following information was developed to assist you to prepare for your operation  What do I need to do before coming to the hospital?   Arrange for a responsible person to drive you to and from the hospital    Arrange care for your children at home  Children are not allowed in the recovery areas of the hospital   Plan to wear clothing that is easy to put on and take off  If you are having shoulder surgery, wear a shirt that buttons or zippers in the front  Bathing  o Shower the evening before and the morning of your surgery with an antibacterial soap  Please refer to the Pre Op Showering Instructions for Surgery Patients Sheet   o Remove nail polish and all body piercing jewelry  o Do not shave any body part for at least 24 hours before surgery-this includes face, arms, legs and upper body  Food  o Nothing to eat or drink after midnight the night before your surgery  This includes candy and chewing gum  o Exception: If your surgery is after 12:00pm (noon), you may have clear liquids such as 7-Up®, ginger ale, apple or cranberry juice, Jell-O®, water, or clear broth until 8:00 am  o Do not drink milk or juice with pulp on the morning before surgery  o Do not drink alcohol 24 hours before surgery  Medicine  o Follow instructions you received from your surgeon about which medicines you may take on the day of surgery  o If instructed to take medicine on the morning of surgery, take pills with just a small sip of water  Call your prescribing doctor for specific infroamtion on what to do if you take insulin    What should I bring to the hospital?    Bring:  Claribel Ballesteros or a walker, if you have them, for foot or knee surgery   A list of the daily medicines, vitamins, minerals, herbals and nutritional supplements you take   Include the dosages of medicines and the time you take them each day   Glasses, dentures or hearing aids   Minimal clothing; you will be wearing hospital sleepwear   Photo ID; required to verify your identity   If you have a Living Will or Power of , bring a copy of the documents   If you have an ostomy, bring an extra pouch and any supplies you use    Do not bring   Medicines or inhalers   Money, valuables or jewelry    What other information should I know about the day of surgery?  Notify your surgeons if you develop a cold, sore throat, cough, fever, rash or any other illness   Report to the Ambulatory Surgical/Same Day Surgery Unit   You will be instructed to stop at Registration only if you have not been pre-registered   Inform your  fi they do not stay that they will be asked by the staff to leave a phone number where they can be reached   Be available to be reached before surgery  In the event the operating room schedule changes, you may be asked to come in earlier or later than expected    *It is important to tell your doctor and others involved in your health care if you are taking or have been taking any non-prescription drugs, vitamins, minerals, herbals or other nutritional supplements  Any of these may interact with some food or medicines and cause a reaction      Pre-Surgery Instructions:   Medication Instructions    HYDROcodone-acetaminophen (NORCO) 5-325 mg per tablet Uses PRN- DO NOT take day of surgery    Multiple Vitamin (multivitamin) tablet Hold day of surgery   sildenafil (VIAGRA) 100 mg tablet Hold day of surgery      Tamsulosin HCl (FLOMAX PO) Take night before surgery

## 2022-05-08 ENCOUNTER — ANESTHESIA EVENT (OUTPATIENT)
Dept: PERIOP | Facility: HOSPITAL | Age: 57
End: 2022-05-08
Payer: COMMERCIAL

## 2022-05-08 NOTE — PROGRESS NOTES
H&P Exam - Urology       Patient: Lisandra Jarquin   : 1965 Sex: male   MRN: [id number]     CSN: [id number]      History of Present Illness   HPI:  Lisandra Jarquin is a 61 y o  male who presents with worsening left flank pain [facility] last Tuesday May 3rd found on CT scan to have 4-5 mm right upper pole stone in a by freed collecting system presenting to my office on Wednesday with pain ranging from 6 to 10 at a 10 scheduled for outpatient left ureteroscopy laser lithotripsy for next Thursday calling me Friday morning stating that the pain was too severe and wishes to have the case moved up as soon as possible patient is aware that even with this stone being removed his pain may still continue but states had similar pain on right side which disappeared 1 stone was removed by me months ago aware risk of anesthesia infection bleeding additional urologic procedures  Review of Systems:   Constitutional:  Negative for activity change, fever, chills and diaphoresis  HENT: Negative for hearing loss and trouble swallowing  Eyes: Negative for itching and visual disturbance  Respiratory: Negative for chest tightness and shortness of breath  Cardiovascular: Negative for chest pain, edema  Gastrointestinal: Negative for abdominal distention, na abdominal pain, constipation, diarrhea, Nausea and vomiting  Genitourinary: Negative for decreased urine volume, difficulty urinating, dysuria, enuresis, frequency, hematuria and urgency  Musculoskeletal: Negative for gait problem and myalgias  Neurological: Negative for dizziness and headaches  Hematological: Does not bruise/bleed easily         Historical Information   Past Medical History:   Diagnosis Date    Anxiety     Depression     GERD (gastroesophageal reflux disease)     Kidney stone     Kidney stones      Past Surgical History:   Procedure Laterality Date    CHOLECYSTECTOMY      CYSTOSCOPY      FL RETROGRADE PYELOGRAM  9/9/2021    INGUINAL HERNIA REPAIR Bilateral     OTHER SURGICAL HISTORY      scar tissue scraped off liver--done at [address] &[employer]WE[facility] STENT INSRT Left 2/28/2021    Procedure: CYSTOSCOPY URETEROSCOPY WITH LITHOTRIPSY HOLMIUM LASER, RETROGRADE PYELOGRAM AND INSERTION STENT URETERAL;  Surgeon: Parish Mi MD;  Location: [address] [place] [address];  Service: Urology    [place] CYSTO/URETERO W/LITHOTRIPSY &INDWELL STENT INSRT Right 9/9/2021    Procedure: CYSTOSCOPY, RIGHT URETEROSCOPY,  RIGHT RETROGRADE, HOLMIUM LASER, STONE BASKET AND RIGHT URETERAL STENT PLACEMENT;  Surgeon: Parish Mi MD;  Location: [facility];  Service: Urology    TONSILLECTOMY AND ADENOIDECTOMY      WISDOM TOOTH EXTRACTION       Social History   Social History     Substance and Sexual Activity   Alcohol Use Yes     Social History     Substance and Sexual Activity   Drug Use Never     Social History     Tobacco Use   Smoking Status Never Smoker   Smokeless Tobacco Never Used     Family History:   Family History   Problem Relation Age of Onset    Cancer Father         prostate    Diabetes Father     Hypertension Father     Lung cancer Father        Meds/Allergies   No medications prior to admission  No Known Allergies    Objective   Vitals: Wt 99 8 kg (220 lb)   BMI 34 46 kg/m²     Physical Exam:  General Alert awake   Normocephalic atraumatic PERRLA  Lungs clear bilaterally  Cardiac normal S1 normal S2  Abdomen soft, flank pain left  Extremities no edema    No intake/output data recorded      Invasive Devices  Report    Drain            Ureteral Drain/Stent Left ureter 6 Fr  433 days    Ureteral Drain/Stent Right ureter 6 Fr  241 days                    Lab Results: CBC:   Lab Results   Component Value Date    WBC 9 80 08/21/2021    HGB 15 1 08/21/2021    HCT 44 3 08/21/2021    MCV 90 08/21/2021     08/21/2021    MCH 30 6 08/21/2021    MCHC 34 1 08/21/2021    RDW 13 1 08/21/2021    MPV 8 7 (L) 08/21/2021    NRBC 0 04/23/2021     CMP:   Lab Results   Component Value Date     08/21/2021    CO2 24 08/21/2021    CO2 28 11/19/2018    BUN 28 (H) 08/21/2021    CREATININE 1 26 (H) 08/21/2021    CALCIUM 8 7 08/21/2021    AST 17 08/21/2021    ALT 25 08/21/2021    ALKPHOS 58 5 08/21/2021    EGFR 63 08/21/2021     Urinalysis:   Lab Results   Component Value Date    COLORU Yellow 08/21/2021    CLARITYU Clear 08/21/2021    SPECGRAV >=1 030 08/21/2021    PHUR 5 5 08/21/2021    LEUKOCYTESUR Negative 08/21/2021    NITRITE Negative 08/21/2021    GLUCOSEU Negative 08/21/2021    KETONESU Negative 08/21/2021    BILIRUBINUR Negative 08/21/2021    BLOODU Negative 08/21/2021     Urine Culture:   Lab Results   Component Value Date    URINECX No Growth <1000 cfu/mL 04/12/2021     PSA:   Lab Results   Component Value Date    PSA 0 2 06/02/2021    PSA 0 2 04/27/2021           Assessment/ Plan:  Left 5 mm renal stone  Cysto left ureteroscopy laser lithotripsy stent placement      Magdaleno Salinas MD

## 2022-05-09 ENCOUNTER — APPOINTMENT (OUTPATIENT)
Dept: RADIOLOGY | Facility: HOSPITAL | Age: 57
End: 2022-05-09
Payer: COMMERCIAL

## 2022-05-09 ENCOUNTER — HOSPITAL ENCOUNTER (OUTPATIENT)
Facility: HOSPITAL | Age: 57
Setting detail: OUTPATIENT SURGERY
Discharge: HOME | End: 2022-05-09
Attending: SPECIALIST | Admitting: SPECIALIST
Payer: COMMERCIAL

## 2022-05-09 ENCOUNTER — ANESTHESIA (OUTPATIENT)
Dept: PERIOP | Facility: HOSPITAL | Age: 57
End: 2022-05-09
Payer: COMMERCIAL

## 2022-05-09 VITALS
BODY MASS INDEX: 35.75 KG/M2 | DIASTOLIC BLOOD PRESSURE: 67 MMHG | SYSTOLIC BLOOD PRESSURE: 119 MMHG | WEIGHT: 227.8 LBS | TEMPERATURE: 97.6 F | HEIGHT: 67 IN | OXYGEN SATURATION: 97 % | HEART RATE: 73 BPM | RESPIRATION RATE: 18 BRPM

## 2022-05-09 DIAGNOSIS — N20.0 CALCULUS OF KIDNEY: ICD-10-CM

## 2022-05-09 PROCEDURE — 74420 UROGRAPHY RTRGR +-KUB: CPT

## 2022-05-09 PROCEDURE — 82360 CALCULUS ASSAY QUANT: CPT | Performed by: SPECIALIST

## 2022-05-09 PROCEDURE — C1769 GUIDE WIRE: HCPCS | Performed by: SPECIALIST

## 2022-05-09 PROCEDURE — 88300 SURGICAL PATH GROSS: CPT | Performed by: PATHOLOGY

## 2022-05-09 PROCEDURE — C2617 STENT, NON-COR, TEM W/O DEL: HCPCS | Performed by: SPECIALIST

## 2022-05-09 PROCEDURE — C1894 INTRO/SHEATH, NON-LASER: HCPCS | Performed by: SPECIALIST

## 2022-05-09 DEVICE — INLAY URETERAL STENT W/O GUIDEWIRE
Type: IMPLANTABLE DEVICE | Site: URETER | Status: FUNCTIONAL
Brand: BARD® INLAY® URETERAL STENT

## 2022-05-09 RX ORDER — PROPOFOL 10 MG/ML
INJECTION, EMULSION INTRAVENOUS AS NEEDED
Status: DISCONTINUED | OUTPATIENT
Start: 2022-05-09 | End: 2022-05-09

## 2022-05-09 RX ORDER — LIDOCAINE HYDROCHLORIDE 20 MG/ML
INJECTION, SOLUTION EPIDURAL; INFILTRATION; INTRACAUDAL; PERINEURAL AS NEEDED
Status: DISCONTINUED | OUTPATIENT
Start: 2022-05-09 | End: 2022-05-09

## 2022-05-09 RX ORDER — MIDAZOLAM HYDROCHLORIDE 2 MG/2ML
INJECTION, SOLUTION INTRAMUSCULAR; INTRAVENOUS AS NEEDED
Status: DISCONTINUED | OUTPATIENT
Start: 2022-05-09 | End: 2022-05-09

## 2022-05-09 RX ORDER — FENTANYL CITRATE 50 UG/ML
INJECTION, SOLUTION INTRAMUSCULAR; INTRAVENOUS AS NEEDED
Status: DISCONTINUED | OUTPATIENT
Start: 2022-05-09 | End: 2022-05-09

## 2022-05-09 RX ORDER — LEVOFLOXACIN 5 MG/ML
INJECTION, SOLUTION INTRAVENOUS CONTINUOUS PRN
Status: DISCONTINUED | OUTPATIENT
Start: 2022-05-09 | End: 2022-05-09

## 2022-05-09 RX ORDER — FENTANYL CITRATE/PF 50 MCG/ML
50 SYRINGE (ML) INJECTION
Status: DISCONTINUED | OUTPATIENT
Start: 2022-05-09 | End: 2022-05-09 | Stop reason: HOSPADM

## 2022-05-09 RX ORDER — SODIUM CHLORIDE, SODIUM LACTATE, POTASSIUM CHLORIDE, CALCIUM CHLORIDE 600; 310; 30; 20 MG/100ML; MG/100ML; MG/100ML; MG/100ML
100 INJECTION, SOLUTION INTRAVENOUS CONTINUOUS
Status: CANCELLED | OUTPATIENT
Start: 2022-05-09

## 2022-05-09 RX ORDER — CEFAZOLIN SODIUM 2 G/50ML
2000 SOLUTION INTRAVENOUS ONCE
Status: DISCONTINUED | OUTPATIENT
Start: 2022-05-09 | End: 2022-05-09 | Stop reason: HOSPADM

## 2022-05-09 RX ORDER — MAGNESIUM HYDROXIDE 1200 MG/15ML
LIQUID ORAL AS NEEDED
Status: DISCONTINUED | OUTPATIENT
Start: 2022-05-09 | End: 2022-05-09 | Stop reason: HOSPADM

## 2022-05-09 RX ORDER — ONDANSETRON 2 MG/ML
4 INJECTION INTRAMUSCULAR; INTRAVENOUS ONCE AS NEEDED
Status: DISCONTINUED | OUTPATIENT
Start: 2022-05-09 | End: 2022-05-09 | Stop reason: HOSPADM

## 2022-05-09 RX ORDER — ONDANSETRON 2 MG/ML
INJECTION INTRAMUSCULAR; INTRAVENOUS AS NEEDED
Status: DISCONTINUED | OUTPATIENT
Start: 2022-05-09 | End: 2022-05-09

## 2022-05-09 RX ORDER — DEXAMETHASONE SODIUM PHOSPHATE 4 MG/ML
INJECTION, SOLUTION INTRA-ARTICULAR; INTRALESIONAL; INTRAMUSCULAR; INTRAVENOUS; SOFT TISSUE AS NEEDED
Status: DISCONTINUED | OUTPATIENT
Start: 2022-05-09 | End: 2022-05-09

## 2022-05-09 RX ORDER — SODIUM CHLORIDE, SODIUM LACTATE, POTASSIUM CHLORIDE, CALCIUM CHLORIDE 600; 310; 30; 20 MG/100ML; MG/100ML; MG/100ML; MG/100ML
125 INJECTION, SOLUTION INTRAVENOUS CONTINUOUS
Status: DISCONTINUED | OUTPATIENT
Start: 2022-05-09 | End: 2022-05-09 | Stop reason: HOSPADM

## 2022-05-09 RX ORDER — HYDROCODONE BITARTRATE AND ACETAMINOPHEN 5; 325 MG/1; MG/1
1 TABLET ORAL ONCE
Status: COMPLETED | OUTPATIENT
Start: 2022-05-09 | End: 2022-05-09

## 2022-05-09 RX ADMIN — LEVOFLOXACIN: 5 INJECTION, SOLUTION INTRAVENOUS at 08:34

## 2022-05-09 RX ADMIN — FENTANYL CITRATE 50 MCG: 50 INJECTION, SOLUTION INTRAMUSCULAR; INTRAVENOUS at 09:55

## 2022-05-09 RX ADMIN — FENTANYL CITRATE 50 MCG: 50 INJECTION, SOLUTION INTRAMUSCULAR; INTRAVENOUS at 09:47

## 2022-05-09 RX ADMIN — FENTANYL CITRATE 50 MCG: 50 INJECTION, SOLUTION INTRAMUSCULAR; INTRAVENOUS at 08:24

## 2022-05-09 RX ADMIN — ONDANSETRON 4 MG: 2 INJECTION INTRAMUSCULAR; INTRAVENOUS at 08:38

## 2022-05-09 RX ADMIN — PROPOFOL 200 MG: 10 INJECTION, EMULSION INTRAVENOUS at 08:24

## 2022-05-09 RX ADMIN — SODIUM CHLORIDE, SODIUM LACTATE, POTASSIUM CHLORIDE, AND CALCIUM CHLORIDE: .6; .31; .03; .02 INJECTION, SOLUTION INTRAVENOUS at 08:06

## 2022-05-09 RX ADMIN — DEXAMETHASONE SODIUM PHOSPHATE 8 MG: 4 INJECTION, SOLUTION INTRA-ARTICULAR; INTRALESIONAL; INTRAMUSCULAR; INTRAVENOUS; SOFT TISSUE at 08:38

## 2022-05-09 RX ADMIN — HYDROCODONE BITARTRATE AND ACETAMINOPHEN 1 TABLET: 5; 325 TABLET ORAL at 10:10

## 2022-05-09 RX ADMIN — LIDOCAINE HYDROCHLORIDE 100 MG: 20 INJECTION, SOLUTION EPIDURAL; INFILTRATION; INTRACAUDAL; PERINEURAL at 08:24

## 2022-05-09 RX ADMIN — FENTANYL CITRATE 50 MCG: 50 INJECTION, SOLUTION INTRAMUSCULAR; INTRAVENOUS at 08:48

## 2022-05-09 RX ADMIN — MIDAZOLAM 2 MG: 1 INJECTION INTRAMUSCULAR; INTRAVENOUS at 08:11

## 2022-05-09 NOTE — PERIOPERATIVE NURSING NOTE
Pt d/c to home at this time w/wife,  Via w/c  Pt left with all belongings  Iv was D/C intact with dry sterile dressing,  Encouraged to keep follow up appointments, Verbalized understanding  D/C instructions reviewed and explained with patient and family member  Verbalized understanding  New Rx explained, Verbalized understanding

## 2022-05-09 NOTE — PROGRESS NOTES
Progress Note - Urology      Patient: Shea Levy   : 1965 Sex: male   MRN: [id number]     CSN: [id number]  Unit/Bed#: OR POOL     SUBJECTIVE:   Patient in surgical preop unit  Continue to have pain feels it is definitely from 5 mm upper pole stone though could be neuromuscular patient fully aware      Objective   Vitals: /77   Pulse 80   Temp 98 7 °F (37 1 °C) (Temporal)   Resp 18   Ht 5' 7" (1 702 m)   Wt 103 kg (227 lb 12 8 oz)   SpO2 98%   BMI 35 68 kg/m²     No intake/output data recorded        Physical Exam:   General Alert awake   Normocephalic atraumatic PERRLA  Lungs clear bilaterally  Cardiac normal S1 normal S2  Abdomen soft, flank pain  Extremities no edema      Lab Results: CBC:   Lab Results   Component Value Date    WBC 9 80 2021    HGB 15 1 2021    HCT 44 3 2021    MCV 90 2021     2021    MCH 30 6 2021    MCHC 34 1 2021    RDW 13 1 2021    MPV 8 7 (L) 2021    NRBC 0 2021     CMP:   Lab Results   Component Value Date     2021    CO2 24 2021    CO2 28 2018    BUN 28 (H) 2021    CREATININE 1 26 (H) 2021    CALCIUM 8 7 2021    AST 17 2021    ALT 25 2021    ALKPHOS 58 5 2021    EGFR 63 2021     Urinalysis:   Lab Results   Component Value Date    COLORU Yellow 2021    CLARITYU Clear 2021    SPECGRAV >=1 030 2021    PHUR 5 5 2021    LEUKOCYTESUR Negative 2021    NITRITE Negative 2021    GLUCOSEU Negative 2021    KETONESU Negative 2021    BILIRUBINUR Negative 2021    BLOODU Negative 2021     Urine Culture:   Lab Results   Component Value Date    URINECX No Growth <1000 cfu/mL 2021     PSA:   Lab Results   Component Value Date    PSA 0 2 2021    PSA 0 2 2021         Assessment/ Plan:  Cysto left ureteroscopy laser stent patient aware that even if 5 mm stone removed may still have significant left back and flank pain may be neurologic may need additional workup aware risk of anesthesia infection bleeding additional urologic procedures          Nancy Leos MD

## 2022-05-09 NOTE — ANESTHESIA PREPROCEDURE EVALUATION
Procedure:  CYSTOSCOPY URETEROSCOPY WITH LITHOTRIPSY HOLMIUM LASER, RETROGRADE PYELOGRAM AND INSERTION STENT URETERAL (Left Bladder)    Relevant Problems   ANESTHESIA (within normal limits)      CARDIO (within normal limits)      GI/HEPATIC   (+) Gastroesophageal reflux disease   (+) Hepatic steatosis   (+) Rectal bleed      /RENAL   (+) Nephrolithiasis      NEURO/PSYCH   (+) Generalized anxiety disorder   (+) Severe episode of recurrent major depressive disorder, without psychotic features ([address] )      PULMONARY (within normal limits)        Physical Exam    Airway    Mallampati score: II  TM Distance: >3 FB  Neck ROM: full     Dental   No notable dental hx     Cardiovascular  Rhythm: regular, Rate: normal,     Pulmonary  Breath sounds clear to auscultation,     Other Findings        Anesthesia Plan  ASA Score- 2     Anesthesia Type- general with ASA Monitors  Additional Monitors:   Airway Plan:           Plan Factors-Exercise tolerance (METS): >4 METS  Chart reviewed  EKG reviewed  Existing labs reviewed  Patient summary reviewed  Patient is not a current smoker  Induction- intravenous  Postoperative Plan- Plan for postoperative opioid use  Planned trial extubation    Informed Consent- Anesthetic plan and risks discussed with patient  I personally reviewed this patient with the CRNA  Discussed and agreed on the Anesthesia Plan with the CRNA  Heather Johns

## 2022-05-09 NOTE — OP NOTE
OPERATIVE REPORT  PATIENT NAME: Gabe Eli    :  1965  MRN: [id number]  Pt Location: [facility] OR ROOM 04    SURGERY DATE: 2022    Surgeon(s) and Role:     * Wendy Fairbanks MD - Primary    Preop Diagnosis:  Calculus of kidney [N20 0]  Left renal stone  5 mm  Post-Op Diagnosis Codes:     * Calculus of kidney [N20 0]    Procedure(s) (LRB):  CYSTOSCOPY URETEROSCOPY WITH LITHOTRIPSY HOLMIUM LASER, RETROGRADE PYELOGRAM, BASKET AND INSERTION STENT URETERAL (Left)    Specimen(s):  ID Type Source Tests Collected by Time Destination   1 : Left renal stone Calculus Kidney, Left STONE ANALYSIS, TISSUE EXAM Wendy Fairbanks MD 2022 [id number]        Estimated Blood Loss:   Minimal    Drains:  Ureteral Internal Stent Left ureter 6 Fr  (Active)   Number of days: 0       Anesthesia Type:   IV Sedation with Anesthesia    Operative Indications:  Calculus of kidney [N20 0]  This 59-year-old male with history of bilateral renal stones presented to [facility] ER last Tuesday May 3rd with severe left flank pain found to have 5 mm upper duplex stone presenting to my office on  complaining of severe pain patient states that it definitely coming from his stone and not neuromuscular in origin since there was no hydronephrosis noted around it wishing to have it out as soon as possible aware of risk of anesthesia unrelenting pain and possible spine evaluation if pain does not clear after stone and stent removal    Operative Findings:  5 mm upper calyx stone laser lithotripsy basket extraction stent placed    Complications:   None    Procedure and Technique:  After patient identified in the holding area left side marked consent signed placed the op suite after anesthesia induced placed in thigh position draped prep standard fashion time-out performed    A 22 Urdu cystoscope passed through urethra into the bladder 5 Western Radha open-ended catheter placed into the left orifice left retrograde pyelogram confirmed normal filling of the distal mid proximal ureter as well as a by freed renal pelvis with the single upper pole calyx ureter attaching into the mid and lower pole collecting system 2 038 wires were passed upward into the right upper pole calyx of 1st left a safety the 2nd cannulated with the 45 cm ureteral sheath which was passed upward into the renal pelvis and upper pole calyx the obturator and wire removed the flexible scope was placed the 5 mm stone was easily found attempts removing with the basket through the sheath could not be performed the laser was placed the stone was broken into 2 pieces grabbed with the basket and removed the flexible ureteroscopy of the remaining middle and lower poles confirmed no other stone fragments the scope was removed ureteroscopy confirmed normal proximal mid and distal ureter sheath removed over the safety wire 24 cm 6 Serbian stent was passed wire removed scope removed postop procedure was awakened taken recovery room stable condition   I was present for the entire procedure    Patient Disposition:  PACU       SIGNATURE: Magdaleno Salinas MD  DATE: May 9, 2022  TIME: 9:30 AM

## 2022-05-09 NOTE — PERIOPERATIVE NURSING NOTE
Received patient from PACU in room 12, patient is alert and awake, VSS, Patient is tolerating PO fluids, call bell placed in reach, wife at bedside,  will continue to monitor patient until d/c criteria met

## 2022-05-09 NOTE — DISCHARGE INSTRUCTIONS
#1 no heavy straining or lifting above 10 pounds for 2 weeks    #2 call office fevers, chills, or worsening blood in the urine  #3 Patient has follow-up Dr Arabella Chavez tomorrow May 10th 11:00 a m  For cysto stent removal      Gurmeet TINOCO  [address] [facility] [place]  [address] [place] [address]  [phone]  8:30 AM to 4:30 PM  Monday through Friday    [place] office  [phone]  [address]  [phone]  1:00 to 5:00 PM  Wednesday

## 2022-05-10 ENCOUNTER — HOSPITAL ENCOUNTER (EMERGENCY)
Facility: HOSPITAL | Age: 57
Discharge: HOME | End: 2022-05-10
Attending: EMERGENCY MEDICINE | Admitting: EMERGENCY MEDICINE
Payer: COMMERCIAL

## 2022-05-10 VITALS
BODY MASS INDEX: 35.55 KG/M2 | TEMPERATURE: 99 F | DIASTOLIC BLOOD PRESSURE: 76 MMHG | HEART RATE: 68 BPM | SYSTOLIC BLOOD PRESSURE: 139 MMHG | WEIGHT: 227 LBS | RESPIRATION RATE: 18 BRPM | OXYGEN SATURATION: 95 %

## 2022-05-10 DIAGNOSIS — R10.9 FLANK PAIN: Primary | ICD-10-CM

## 2022-05-10 DIAGNOSIS — G89.18 POSTOPERATIVE PAIN: ICD-10-CM

## 2022-05-10 PROCEDURE — 99284 EMERGENCY DEPT VISIT MOD MDM: CPT

## 2022-05-10 PROCEDURE — 96374 THER/PROPH/DIAG INJ IV PUSH: CPT

## 2022-05-10 PROCEDURE — 99285 EMERGENCY DEPT VISIT HI MDM: CPT | Performed by: EMERGENCY MEDICINE

## 2022-05-10 PROCEDURE — 96361 HYDRATE IV INFUSION ADD-ON: CPT

## 2022-05-10 PROCEDURE — 96376 TX/PRO/DX INJ SAME DRUG ADON: CPT

## 2022-05-10 RX ORDER — HYDROMORPHONE HCL/PF 1 MG/ML
1 SYRINGE (ML) INJECTION ONCE
Status: COMPLETED | OUTPATIENT
Start: 2022-05-10 | End: 2022-05-10

## 2022-05-10 RX ADMIN — HYDROMORPHONE HYDROCHLORIDE 1 MG: 1 INJECTION, SOLUTION INTRAMUSCULAR; INTRAVENOUS; SUBCUTANEOUS at 14:47

## 2022-05-10 RX ADMIN — SODIUM CHLORIDE 1000 ML: 0.9 INJECTION, SOLUTION INTRAVENOUS at 14:22

## 2022-05-10 RX ADMIN — HYDROMORPHONE HYDROCHLORIDE 1 MG: 1 INJECTION, SOLUTION INTRAMUSCULAR; INTRAVENOUS; SUBCUTANEOUS at 14:09

## 2022-05-10 NOTE — ED PROVIDER NOTES
History  Chief Complaint   Patient presents with    Flank Pain     Sent by Dr Geo Nieto, patient had a stent removed at office today  Having severe pain, Dr Geo Nieto called and requested injection for pain on arrival      Patient a recent kidney stone with ureteral stenting  He was seen in the urologist office today and had the stent removed  The patient had increased pain not relieved with p o  Medication  He had no hematuria  No fever chills  No vomiting  Patient was sent in for pain medication by his urologist           Prior to Admission Medications   Prescriptions Last Dose Informant Patient Reported? Taking?    HYDROcodone-acetaminophen (NORCO) 5-325 mg per tablet  Self Yes No   Sig: Take 1 tablet by mouth every 6 (six) hours as needed for pain     Multiple Vitamin (multivitamin) tablet   Yes No   Sig: Take 1 tablet by mouth daily   Tamsulosin HCl (FLOMAX PO)  Self Yes No   Sig: Take 0 4 mg by mouth daily   ketorolac (TORADOL) 10 mg tablet  Self Yes No   Sig: Take 10 mg by mouth every 6 (six) hours as needed for moderate pain     sildenafil (VIAGRA) 100 mg tablet  Self Yes No   Sig: Take 100 mg by mouth daily as needed for erectile dysfunction   Patient not taking: Reported on 5/22/2022      Facility-Administered Medications: None       Past Medical History:   Diagnosis Date    Anxiety     Depression     GERD (gastroesophageal reflux disease)     Kidney stone     Kidney stones        Past Surgical History:   Procedure Laterality Date    CHOLECYSTECTOMY      CYSTOSCOPY      FL RETROGRADE PYELOGRAM  9/9/2021    FL RETROGRADE PYELOGRAM  5/9/2022    INGUINAL HERNIA REPAIR Bilateral     OTHER SURGICAL HISTORY      scar tissue scraped off liver--done at [address]  [facility] W/LITHOTRIPSY &INDWELL STENT INSRT Left 2/28/2021    Procedure: CYSTOSCOPY URETEROSCOPY WITH LITHOTRIPSY HOLMIUM LASER, RETROGRADE PYELOGRAM AND INSERTION STENT URETERAL;  Surgeon: Piyush Pacheco MD;  Location: [address] [place] [address] [address];  Service: Urology    [place] CYSTO/URETERO W/LITHOTRIPSY &INDWELL STENT INSRT Right 9/9/2021    Procedure: CYSTOSCOPY, RIGHT URETEROSCOPY,  RIGHT RETROGRADE, HOLMIUM LASER, STONE BASKET AND RIGHT URETERAL STENT PLACEMENT;  Surgeon: Merle Jalloh MD;  Location: [facility] OR;  Service: Urology    MI CYSTO/URETERO W/LITHOTRIPSY &INDWELL STENT INSRT Left 5/9/2022    Procedure: CYSTOSCOPY URETEROSCOPY WITH LITHOTRIPSY HOLMIUM LASER, RETROGRADE PYELOGRAM, BASKET AND INSERTION STENT URETERAL;  Surgeon: Merle Jalloh MD;  Location: [facility] OR;  Service: Urology    TONSILLECTOMY AND ADENOIDECTOMY      WISDOM TOOTH EXTRACTION         Family History   Problem Relation Age of Onset    Cancer Father         prostate    Diabetes Father     Hypertension Father     Lung cancer Father      I have reviewed and agree with the history as documented  E-Cigarette/Vaping    E-Cigarette Use Never User      E-Cigarette/Vaping Substances     Social History     Tobacco Use    Smoking status: Never Smoker    Smokeless tobacco: Never Used   Vaping Use    Vaping Use: Never used   Substance Use Topics    Alcohol use: Yes    Drug use: Never       Review of Systems   Constitutional: Negative for chills and fever  HENT: Negative for congestion and sore throat  Eyes: Negative for visual disturbance  Respiratory: Negative for cough and shortness of breath  Cardiovascular: Negative for chest pain  Gastrointestinal: Negative for abdominal pain, nausea and vomiting  Genitourinary: Positive for flank pain  Negative for dysuria and hematuria  Musculoskeletal: Positive for back pain  Skin: Negative for rash  Neurological: Negative for weakness and numbness  Hematological: Does not bruise/bleed easily  Psychiatric/Behavioral: Negative for confusion  All other systems reviewed and are negative  Physical Exam  Physical Exam  Vitals and nursing note reviewed  Constitutional:       Appearance: Normal appearance  HENT:      Head: Normocephalic  Right Ear: External ear normal       Left Ear: External ear normal       Nose: Nose normal       Mouth/Throat:      Pharynx: Oropharynx is clear  Eyes:      Conjunctiva/sclera: Conjunctivae normal    Cardiovascular:      Rate and Rhythm: Normal rate and regular rhythm  Pulmonary:      Effort: Pulmonary effort is normal    Abdominal:      Palpations: Abdomen is soft  Musculoskeletal:         General: Normal range of motion  Cervical back: Normal range of motion  Skin:     General: Skin is warm and dry  Capillary Refill: Capillary refill takes less than 2 seconds  Neurological:      General: No focal deficit present  Mental Status: He is alert     Psychiatric:         Behavior: Behavior normal       Comments: Patient arrives demanding the Dilaudid, states he can answer questions later         Vital Signs  ED Triage Vitals [05/10/22 1329]   Temperature Pulse Respirations Blood Pressure SpO2   99 °F (37 2 °C) 78 20 142/84 100 %      Temp Source Heart Rate Source Patient Position - Orthostatic VS BP Location FiO2 (%)   Tympanic Monitor Sitting Left arm --      Pain Score       10 - Worst Possible Pain           Vitals:    05/10/22 1329 05/10/22 1530   BP: 142/84 139/76   Pulse: 78 68   Patient Position - Orthostatic VS: Sitting          Visual Acuity      ED Medications  Medications   HYDROmorphone (DILAUDID) injection 1 mg (1 mg Intravenous Given 5/10/22 1409)   sodium chloride 0 9 % bolus 1,000 mL (0 mL Intravenous Stopped 5/10/22 1543)   HYDROmorphone (DILAUDID) injection 1 mg (1 mg Intravenous Given 5/10/22 1447)       Diagnostic Studies  Results Reviewed     None                 No orders to display              Procedures  Procedures         ED Course                               SBIRT 22yo+    Flowsheet Row Most Recent Value   SBIRT (25 yo +)    In order to provide better care to our patients, we are screening all of our patients for alcohol and drug use  Would it be okay to ask you these screening questions? No Filed at: 05/10/2022 1444                    [facility]  Number of Diagnoses or Management Options  Diagnosis management comments: Will medicate for pain      Disposition  Final diagnoses:   Flank pain   Postoperative pain     Time reflects when diagnosis was documented in both MDM as applicable and the Disposition within this note     Time User Action Codes Description Comment    5/10/2022  3:26 PM Jhony ABEBE Add [R10 9] Flank pain     5/10/2022  3:26 PM Petra Brown Add [G89 18] Postoperative pain       ED Disposition     ED Disposition   Discharge    Condition   Stable    Date/Time   Tue May 10, 2022  3:26 PM    Comment   Hunter Chawla discharge to home/self care  Follow-up Information     Follow up With Specialties Details Why Contact Info    Ruben Merida MD Urology   [address] [facility]  [phone]            Discharge Medication List as of 5/10/2022  3:27 PM      CONTINUE these medications which have NOT CHANGED    Details   HYDROcodone-acetaminophen (NORCO) 5-325 mg per tablet Take 1 tablet by mouth every 6 (six) hours as needed for pain  , Historical Med      ketorolac (TORADOL) 10 mg tablet Take 10 mg by mouth every 6 (six) hours as needed for moderate pain  , Historical Med      Multiple Vitamin (multivitamin) tablet Take 1 tablet by mouth daily, Historical Med      sildenafil (VIAGRA) 100 mg tablet Take 100 mg by mouth daily as needed for erectile dysfunction, Historical Med      Tamsulosin HCl (FLOMAX PO) Take 0 4 mg by mouth daily, Historical Med             No discharge procedures on file      PDMP Review     None          ED Provider  Electronically Signed by           Amy Eduardo MD  05/10/22 1613       Amy Eduardo MD  05/23/22 1292

## 2022-05-14 LAB
COLOR STONE: NORMAL
COM MFR STONE: 100 %
COMMENT-STONE3: NORMAL
COMPOSITION: NORMAL
LABORATORY COMMENT REPORT: NORMAL
PHOTO: NORMAL
SIZE STONE: NORMAL MM
SPEC SOURCE SUBJ: NORMAL
STONE ANALYSIS-IMP: NORMAL
STONE ANALYSIS-IMP: NORMAL
WT STONE: 17 MG

## 2022-05-22 ENCOUNTER — OFFICE VISIT (OUTPATIENT)
Dept: URGENT CARE | Facility: MEDICAL CENTER | Age: 57
End: 2022-05-22
Payer: COMMERCIAL

## 2022-05-22 VITALS
TEMPERATURE: 98.5 F | OXYGEN SATURATION: 98 % | HEART RATE: 99 BPM | HEIGHT: 67 IN | WEIGHT: 220 LBS | RESPIRATION RATE: 19 BRPM | BODY MASS INDEX: 34.53 KG/M2

## 2022-05-22 DIAGNOSIS — B34.9 ACUTE VIRAL SYNDROME: Primary | ICD-10-CM

## 2022-05-22 DIAGNOSIS — R05.9 COUGH: ICD-10-CM

## 2022-05-22 DIAGNOSIS — R50.9 FEVER, UNSPECIFIED FEVER CAUSE: ICD-10-CM

## 2022-05-22 PROCEDURE — 99213 OFFICE O/P EST LOW 20 MIN: CPT | Performed by: PHYSICIAN ASSISTANT

## 2022-05-22 PROCEDURE — 87636 SARSCOV2 & INF A&B AMP PRB: CPT | Performed by: PHYSICIAN ASSISTANT

## 2022-05-22 RX ORDER — METHOCARBAMOL 750 MG/1
750 TABLET, FILM COATED ORAL 3 TIMES DAILY PRN
COMMUNITY
Start: 2022-05-20

## 2022-05-22 RX ORDER — LEVOFLOXACIN 500 MG/1
500 TABLET, FILM COATED ORAL DAILY
COMMUNITY
Start: 2022-05-17

## 2022-05-22 RX ORDER — MELOXICAM 15 MG/1
15 TABLET ORAL DAILY
COMMUNITY
Start: 2022-05-20 | End: 2022-05-30

## 2022-05-22 RX ORDER — DEXTROMETHORPHAN HYDROBROMIDE AND PROMETHAZINE HYDROCHLORIDE 15; 6.25 MG/5ML; MG/5ML
5 SOLUTION ORAL 4 TIMES DAILY PRN
Qty: 120 ML | Refills: 0 | Status: SHIPPED | OUTPATIENT
Start: 2022-05-22

## 2022-05-22 NOTE — PROGRESS NOTES
[facility]        NAME: Nellie Hernandez is a 61 y o  male  : 1965    MRN: [id number]  DATE: May 22, 2022  TIME: 3:21 PM    Assessment and Plan   Acute viral syndrome [B34 9]  1  Acute viral syndrome  Covid19 and INFLUENZA A/B PCR   2  Fever, unspecified fever cause  Covid19 and INFLUENZA A/B PCR   3  Cough  Promethazine-DM (PHENERGAN-DM) 6 25-15 mg/5 mL oral syrup    Covid19 and INFLUENZA A/B PCR         Patient Instructions   1  Over-the-counter ibuprofen and/or acetaminophen as needed for pain or fever  2  Oxymetazoline nasal spray 2 sprays in each nostril every 12 hours for no more than the next 5 days as needed for nasal congestion  3  Over-the-counter guaifenesin as needed for mucus relief  4  Gargle salt water as needed for sore throat relief  5  Increase oral fluid consumption  6  Quarantine pending the results of your COVID/flu test     7  Follow-up with primary care provider in 7 days for any persistent symptoms  Chief Complaint     Chief Complaint   Patient presents with    Sinusitis     X4 days with sinus congestion, pressure, nonproductive cough and low grade fevers 99 8F; sweats         History of Present Illness       34-year-old male patient with a 3-4 day history of low-grade fever, cough, nasal congestion, sinus pressure, fatigue  Patient denies any GI symptoms  No shortness of breath or chest pressure or pain  Patient is fully COVID vaccinated with boost       Review of Systems   Review of Systems   Constitutional: Positive for fatigue and fever  Negative for appetite change and chills  HENT: Positive for congestion, rhinorrhea, sinus pressure and sore throat  Negative for facial swelling and sinus pain  Respiratory: Positive for cough  Negative for shortness of breath  Cardiovascular: Negative for chest pain  Gastrointestinal: Negative for abdominal pain, diarrhea, nausea and vomiting  Musculoskeletal: Positive for myalgias     Skin: Negative for rash    Neurological: Negative for dizziness  Current Medications       Current Outpatient Medications:     levofloxacin (LEVAQUIN) 500 mg tablet, Take 500 mg by mouth in the morning , Disp: , Rfl:     meloxicam (MOBIC) 15 mg tablet, Take 15 mg by mouth in the morning , Disp: , Rfl:     methocarbamol (ROBAXIN) 750 mg tablet, Take 750 mg by mouth as needed in the morning and 750 mg as needed at noon and 750 mg as needed in the evening , Disp: , Rfl:     Multiple Vitamin (multivitamin) tablet, Take 1 tablet by mouth daily, Disp: , Rfl:     Promethazine-DM (PHENERGAN-DM) 6 25-15 mg/5 mL oral syrup, Take 5 mL by mouth as needed in the morning and 5 mL as needed at noon and 5 mL as needed in the evening and 5 mL as needed before bedtime for cough  , Disp: 120 mL, Rfl: 0    Tamsulosin HCl (FLOMAX PO), Take 0 4 mg by mouth daily, Disp: , Rfl:     HYDROcodone-acetaminophen (NORCO) 5-325 mg per tablet, Take 1 tablet by mouth every 6 (six) hours as needed for pain  , Disp: , Rfl:     ketorolac (TORADOL) 10 mg tablet, Take 10 mg by mouth every 6 (six) hours as needed for moderate pain  , Disp: , Rfl:     sildenafil (VIAGRA) 100 mg tablet, Take 100 mg by mouth daily as needed for erectile dysfunction (Patient not taking: Reported on 5/22/2022), Disp: , Rfl:     Current Allergies     Allergies as of 05/22/2022 - Reviewed 05/22/2022   Allergen Reaction Noted    Keflex [cephalexin] Rash 05/22/2022            The following portions of the patient's history were reviewed and updated as appropriate: allergies, current medications, past family history, past medical history, past social history, past surgical history and problem list      Past Medical History:   Diagnosis Date    Anxiety     Depression     GERD (gastroesophageal reflux disease)     Kidney stone     Kidney stones        Past Surgical History:   Procedure Laterality Date    CHOLECYSTECTOMY      CYSTOSCOPY      FL RETROGRADE PYELOGRAM  9/9/2021  FL RETROGRADE PYELOGRAM  5/9/2022    INGUINAL HERNIA REPAIR Bilateral     OTHER SURGICAL HISTORY      scar tissue scraped off liver--done at [address] &INDWELL STENT INSRT Left 2/28/2021    Procedure: CYSTOSCOPY URETEROSCOPY WITH LITHOTRIPSY HOLMIUM LASER, RETROGRADE PYELOGRAM AND INSERTION STENT URETERAL;  Surgeon: Chente Lipscomb MD;  Location: [address] [place] [address];  Service: Urology    [place] CYSTO/URETERO W/LITHOTRIPSY &INDWELL STENT INSRT Right 9/9/2021    Procedure: CYSTOSCOPY, RIGHT URETEROSCOPY,  RIGHT RETROGRADE, HOLMIUM LASER, STONE BASKET AND RIGHT URETERAL STENT PLACEMENT;  Surgeon: Chente Lipscomb MD;  Location: [facility] OR;  Service: Urology    [facility] CYSTO/URETERO W/LITHOTRIPSY &INDWELL STENT INSRT Left 5/9/2022    Procedure: CYSTOSCOPY URETEROSCOPY WITH LITHOTRIPSY HOLMIUM LASER, RETROGRADE PYELOGRAM, BASKET AND INSERTION STENT URETERAL;  Surgeon: Chente Lipscomb MD;  Location: [facility];  Service: Urology    TONSILLECTOMY AND ADENOIDECTOMY      WISDOM TOOTH EXTRACTION         Family History   Problem Relation Age of Onset    Cancer Father         prostate    Diabetes Father     Hypertension Father     Lung cancer Father          Medications have been verified  Objective   Pulse 99   Temp 98 5 °F (36 9 °C) (Oral)   Resp 19   Ht 5' 7" (1 702 m)   Wt 99 8 kg (220 lb)   SpO2 98%   BMI 34 46 kg/m²        Physical Exam     Physical Exam  Vitals and nursing note reviewed  Constitutional:       General: He is not in acute distress  Appearance: He is not ill-appearing  HENT:      Head: Normocephalic  Nose: Congestion present  No rhinorrhea  Mouth/Throat:      Mouth: Mucous membranes are dry  Pharynx: Posterior oropharyngeal erythema present  Eyes:      Conjunctiva/sclera: Conjunctivae normal       Pupils: Pupils are equal, round, and reactive to light  Cardiovascular:      Rate and Rhythm: Normal rate and regular rhythm        Pulses: Normal pulses  Heart sounds: Normal heart sounds  Pulmonary:      Effort: Pulmonary effort is normal       Breath sounds: Normal breath sounds  Abdominal:      Tenderness: There is no abdominal tenderness  Musculoskeletal:         General: Normal range of motion  Cervical back: Normal range of motion and neck supple  No rigidity  Skin:     General: Skin is warm and dry  Neurological:      General: No focal deficit present  Mental Status: He is alert and oriented to person, place, and time     Psychiatric:         Mood and Affect: Mood normal          Behavior: Behavior normal

## 2022-05-22 NOTE — PATIENT INSTRUCTIONS
1  Over-the-counter ibuprofen and/or acetaminophen as needed for pain or fever  2  Oxymetazoline nasal spray 2 sprays in each nostril every 12 hours for no more than the next 5 days as needed for nasal congestion  3  Over-the-counter guaifenesin as needed for mucus relief  4  Gargle salt water as needed for sore throat relief  5  Increase oral fluid consumption  6  Quarantine pending the results of your COVID/flu test     7  Follow-up with primary care provider in 7 days for any persistent symptoms

## 2022-05-22 NOTE — LETTER
May 22, 2022     Patient: Ashton Fabry   YOB: 1965   Date of Visit: 5/22/2022       To Whom it May Concern:    Ashton Fabry was seen in my clinic on 5/22/2022  He is to be excused from in-person work pending the results of his COVID/flu test and until he is fever free for 24 hours without the use of medications       If you have any questions or concerns, please don't hesitate to call           Sincerely,          Katie Love PA-C        CC: No Recipients

## 2022-05-23 LAB
FLUAV RNA RESP QL NAA+PROBE: NEGATIVE
FLUBV RNA RESP QL NAA+PROBE: NEGATIVE
SARS-COV-2 RNA RESP QL NAA+PROBE: NEGATIVE

## 2022-05-24 ENCOUNTER — OFFICE VISIT (OUTPATIENT)
Dept: URGENT CARE | Facility: MEDICAL CENTER | Age: 57
End: 2022-05-24
Payer: COMMERCIAL

## 2022-05-24 VITALS
BODY MASS INDEX: 34.53 KG/M2 | RESPIRATION RATE: 18 BRPM | WEIGHT: 220 LBS | HEIGHT: 67 IN | OXYGEN SATURATION: 97 % | TEMPERATURE: 98.4 F | HEART RATE: 86 BPM

## 2022-05-24 DIAGNOSIS — R05.9 COUGH: Primary | ICD-10-CM

## 2022-05-24 PROCEDURE — G0382 LEV 3 HOSP TYPE B ED VISIT: HCPCS | Performed by: PHYSICIAN ASSISTANT

## 2022-05-24 PROCEDURE — S9083 URGENT CARE CENTER GLOBAL: HCPCS | Performed by: PHYSICIAN ASSISTANT

## 2022-05-24 RX ORDER — AZITHROMYCIN 250 MG/1
TABLET, FILM COATED ORAL
Qty: 6 TABLET | Refills: 0 | Status: SHIPPED | OUTPATIENT
Start: 2022-05-24 | End: 2022-05-28

## 2022-05-24 NOTE — PROGRESS NOTES
[address] [employer] [facility]        NAME: Valeriy Gardner is a 61 y o  male  : 1965    MRN: [id number]  DATE: May 24, 2022  TIME: 8:48 AM    Assessment and Plan   Cough [R05 9]  1  Cough  azithromycin (ZITHROMAX) 250 mg tablet         Patient Instructions     Cough  zithromax as directed  Follow up with PCP in 3-5 days  Proceed to  ER if symptoms worsen  Chief Complaint     Chief Complaint   Patient presents with    Sinusitis     X6 days with sinus congestion and pressure on the left side, states he has headaches and neck feels stiff [fevers resolved]         History of Present Illness       54-year-old male who presents complaining of cough, headaches, body aches times 4 days  Patient denies chest pain, shortness off breath  States he was seen 2 days ago and tested for COVID and flu which were negative and today presents requesting antibiotics  I explained to patient that most viral syndromes last 7-10 days, and advised patient that I would wait 4 days prior to starting the antibiotic  Review of Systems   Review of Systems   Constitutional: Negative  HENT: Positive for congestion  Eyes: Negative  Respiratory: Positive for cough  Negative for apnea, choking, chest tightness, shortness of breath, wheezing and stridor  Cardiovascular: Negative  Negative for chest pain           Current Medications       Current Outpatient Medications:     azithromycin (ZITHROMAX) 250 mg tablet, Take 2 tablets today then 1 tablet daily x 4 days, Disp: 6 tablet, Rfl: 0    meloxicam (MOBIC) 15 mg tablet, Take 15 mg by mouth in the morning , Disp: , Rfl:     methocarbamol (ROBAXIN) 750 mg tablet, Take 750 mg by mouth as needed in the morning and 750 mg as needed at noon and 750 mg as needed in the evening , Disp: , Rfl:     Multiple Vitamin (multivitamin) tablet, Take 1 tablet by mouth daily, Disp: , Rfl:     Promethazine-DM (PHENERGAN-DM) 6 25-15 mg/5 mL oral syrup, Take 5 mL by mouth as needed in the morning and 5 mL as needed at noon and 5 mL as needed in the evening and 5 mL as needed before bedtime for cough  , Disp: 120 mL, Rfl: 0    Tamsulosin HCl (FLOMAX PO), Take 0 4 mg by mouth daily, Disp: , Rfl:     HYDROcodone-acetaminophen (NORCO) 5-325 mg per tablet, Take 1 tablet by mouth every 6 (six) hours as needed for pain  , Disp: , Rfl:     ketorolac (TORADOL) 10 mg tablet, Take 10 mg by mouth every 6 (six) hours as needed for moderate pain  , Disp: , Rfl:     levofloxacin (LEVAQUIN) 500 mg tablet, Take 500 mg by mouth in the morning   (Patient not taking: Reported on 5/24/2022), Disp: , Rfl:     sildenafil (VIAGRA) 100 mg tablet, Take 100 mg by mouth daily as needed for erectile dysfunction (Patient not taking: No sig reported), Disp: , Rfl:     Current Allergies     Allergies as of 05/24/2022 - Reviewed 05/24/2022   Allergen Reaction Noted    Keflex [cephalexin] Rash 05/22/2022            The following portions of the patient's history were reviewed and updated as appropriate: allergies, current medications, past family history, past medical history, past social history, past surgical history and problem list      Past Medical History:   Diagnosis Date    Anxiety     Depression     GERD (gastroesophageal reflux disease)     Kidney stone     Kidney stones        Past Surgical History:   Procedure Laterality Date    CHOLECYSTECTOMY      CYSTOSCOPY      [place] RETROGRADE PYELOGRAM  9/9/2021    FL RETROGRADE PYELOGRAM  5/9/2022    INGUINAL HERNIA REPAIR Bilateral     OTHER SURGICAL HISTORY      scar tissue scraped off liver--done at [address] &[facility]WE[facility] STENT INSRT Left 2/28/2021    Procedure: CYSTOSCOPY URETEROSCOPY WITH LITHOTRIPSY HOLMIUM LASER, RETROGRADE PYELOGRAM AND INSERTION STENT URETERAL;  Surgeon: Aleyda Knowles MD;  Location: [address] [place] [address];  Service: Urology    [place] CYSTO/URETERO W/LITHOTRIPSY &[facility]WELL STENT INSRT Right 9/9/2021    Procedure: CYSTOSCOPY, RIGHT URETEROSCOPY,  RIGHT RETROGRADE, HOLMIUM LASER, STONE BASKET AND RIGHT URETERAL STENT PLACEMENT;  Surgeon: Parish Mi MD;  Location: [facility];  Service: Urology    [place] CYSTO/URETERO W/LITHOTRIPSY &INDWELL STENT INSRT Left 5/9/2022    Procedure: CYSTOSCOPY URETEROSCOPY WITH LITHOTRIPSY HOLMIUM LASER, RETROGRADE PYELOGRAM, BASKET AND INSERTION STENT URETERAL;  Surgeon: Parish Mi MD;  Location: [facility];  Service: Urology    TONSILLECTOMY AND ADENOIDECTOMY      WISDOM TOOTH EXTRACTION         Family History   Problem Relation Age of Onset    Cancer Father         prostate    Diabetes Father     Hypertension Father     Lung cancer Father          Medications have been verified  Objective   Pulse 86   Temp 98 4 °F (36 9 °C) (Tympanic)   Resp 18   Ht 5' 7" (1 702 m)   Wt 99 8 kg (220 lb)   SpO2 97%   BMI 34 46 kg/m²        Physical Exam     Physical Exam  Constitutional:       General: He is not in acute distress  Appearance: Normal appearance  He is well-developed  He is not diaphoretic  HENT:      Head: Normocephalic and atraumatic  Right Ear: Hearing, tympanic membrane, ear canal and external ear normal       Left Ear: Hearing, tympanic membrane, ear canal and external ear normal       Nose: Rhinorrhea present  Right Sinus: No maxillary sinus tenderness or frontal sinus tenderness  Left Sinus: No maxillary sinus tenderness or frontal sinus tenderness  Mouth/Throat:      Pharynx: Uvula midline  Cardiovascular:      Rate and Rhythm: Normal rate and regular rhythm  Heart sounds: Normal heart sounds  Pulmonary:      Effort: Pulmonary effort is normal  No respiratory distress  Breath sounds: Normal breath sounds  No stridor  No wheezing, rhonchi or rales  Chest:      Chest wall: No tenderness  Musculoskeletal:      Cervical back: Normal range of motion and neck supple  Lymphadenopathy:      Cervical: No cervical adenopathy     Neurological: Mental Status: He is alert

## 2023-03-09 ENCOUNTER — OFFICE VISIT (OUTPATIENT)
Dept: GASTROENTEROLOGY | Facility: CLINIC | Age: 58
End: 2023-03-09

## 2023-03-09 VITALS
DIASTOLIC BLOOD PRESSURE: 77 MMHG | SYSTOLIC BLOOD PRESSURE: 104 MMHG | HEIGHT: 67 IN | WEIGHT: 225 LBS | BODY MASS INDEX: 35.31 KG/M2 | HEART RATE: 109 BPM

## 2023-03-09 DIAGNOSIS — A09 DIARRHEA OF INFECTIOUS ORIGIN: Primary | ICD-10-CM

## 2023-03-09 DIAGNOSIS — K21.9 GASTROESOPHAGEAL REFLUX DISEASE WITHOUT ESOPHAGITIS: ICD-10-CM

## 2023-03-09 DIAGNOSIS — Z86.010 HISTORY OF COLON POLYPS: ICD-10-CM

## 2023-03-09 RX ORDER — ROSUVASTATIN CALCIUM 5 MG/1
5 TABLET, COATED ORAL EVERY EVENING
COMMUNITY
Start: 2023-02-08

## 2023-03-09 RX ORDER — ESCITALOPRAM OXALATE 10 MG/1
10 TABLET ORAL DAILY
COMMUNITY
Start: 2023-02-18

## 2023-03-09 RX ORDER — RIZATRIPTAN BENZOATE 10 MG/1
TABLET ORAL
COMMUNITY
Start: 2023-02-27

## 2023-03-09 NOTE — PROGRESS NOTES
[facility] - Outpatient Follow-up Note  Maria G Fuentes 61 y o  male MRN: [id number]  Encounter: [id number]          ASSESSMENT AND PLAN:      1  Diarrhea of infectious origin  Patient is well-known to me  Having diarrhea for the last 3 or 4 weeks  The stool seems to be liquid with mucus  This is ongoing  He is having 3-4 bowel movements a day  Stool also has large amount of mucus  He is having incontinence at times  He has not seen any blood in the stool  He denies any fevers or chills  There is no night sweats  He denies any significant abdominal pain  There is no nausea or vomiting  Patient has not taken any recent antibiotics  He did take antibiotics several months ago however  - Stool Enteric Bacterial Panel by PCR; Future  - White Blood Cells, Stool by Gram Stain; Future  - Occult blood 1-3, stool; Future    2  Gastroesophageal reflux disease without esophagitis  History of gastroesophageal reflux disease  Currently it is stable  Patient has history of adhesive disease  Patient has had surgery for lysis of adhesion  Currently he is doing better  3  History of colon polyps  Patient had a colonoscopy about a year ago  He has history of colon polyp       ______________________________________________________________________    SUBJECTIVE: Diarrhea with loose bowel movements about 4-5 times a day  Sometimes he loses control of the stool and gets incontinent  There is no blood in the stool  There is a lot of mucus mucus in the stool  He denies any rectal pain or abdominal pain  There is no nausea or vomiting  He did not take any recent antibiotics  However he has taken antibiotics few months ago  Cannot exclude stool for C  difficile  REVIEW OF SYSTEMS IS OTHERWISE NEGATIVE        Historical Information   Past Medical History:   Diagnosis Date   • Anxiety    • Depression    • GERD (gastroesophageal reflux disease)    • Kidney stone    • Kidney stones • Migraine      Past Surgical History:   Procedure Laterality Date   • CHOLECYSTECTOMY     • CYSTOSCOPY     • FL RETROGRADE PYELOGRAM  9/9/2021   • FL RETROGRADE PYELOGRAM  5/9/2022   • INGUINAL HERNIA REPAIR Bilateral    • OTHER SURGICAL HISTORY      scar tissue scraped off liver--done at [facility]   • [place] CYSTO/URETERO W/LITHOTRIPSY &[facility]WE[facility] STENT INSRT Left 2/28/2021    Procedure: CYSTOSCOPY URETEROSCOPY WITH LITHOTRIPSY HOLMIUM LASER, RETROGRADE PYELOGRAM AND INSERTION STENT URETERAL;  Surgeon: Hunter Grayson MD;  Location: [address] [place] [address];  Service: Urology   • [place] CYSTO/URETERO W/LITHOTRIPSY &INDWELL STENT INSRT Right 9/9/2021    Procedure: CYSTOSCOPY, RIGHT URETEROSCOPY,  RIGHT RETROGRADE, HOLMIUM LASER, STONE BASKET AND RIGHT URETERAL STENT PLACEMENT;  Surgeon: Hunter Grayson MD;  Location: [address] [place] [address];  Service: Urology   • [place] CYSTO/URETERO W/LITHOTRIPSY &INDWELL STENT INSRT Left 5/9/2022    Procedure: CYSTOSCOPY URETEROSCOPY WITH LITHOTRIPSY HOLMIUM LASER, RETROGRADE PYELOGRAM, BASKET AND INSERTION STENT URETERAL;  Surgeon: Hunter Grayson MD;  Location: [facility];  Service: Urology   • TONSILLECTOMY AND ADENOIDECTOMY     • WISDOM TOOTH EXTRACTION       Social History   Social History     Substance and Sexual Activity   Alcohol Use Yes     Social History     Substance and Sexual Activity   Drug Use Never     Social History     Tobacco Use   Smoking Status Never   Smokeless Tobacco Never     Family History   Problem Relation Age of Onset   • Cancer Father         prostate   • Diabetes Father    • Hypertension Father    • Lung cancer Father        Meds/Allergies       Current Outpatient Medications:   •  escitalopram (LEXAPRO) 10 mg tablet  •  HYDROcodone-acetaminophen (NORCO) 5-325 mg per tablet  •  hydrOXYzine HCL (ATARAX) 10 mg tablet  •  methocarbamol (ROBAXIN) 750 mg tablet  •  rizatriptan (MAXALT) 10 mg tablet  •  rosuvastatin (CRESTOR) 5 mg tablet  •  sildenafil (VIAGRA) 100 mg tablet  •  ibuprofen (MOTRIN) 200 mg tablet  •  ketorolac (TORADOL) 10 mg tablet  •  meloxicam (MOBIC) 15 mg tablet  •  meloxicam (MOBIC) 7 5 mg tablet  •  Multiple Vitamin (multivitamin) tablet  •  predniSONE 20 mg tablet  •  Promethazine-DM (PHENERGAN-DM) 6 25-15 mg/5 mL oral syrup  •  Tamsulosin HCl (FLOMAX PO)    Allergies   Allergen Reactions   • Keflex [Cephalexin] Rash           Objective     Blood pressure 104/77, pulse (!) 109, height 5' 7" (1 702 m), weight 102 kg (225 lb)  Body mass index is 35 24 kg/m²  PHYSICAL EXAM:      General Appearance:   Alert, cooperative, no distress   HEENT:   Normocephalic, atraumatic, anicteric      Neck:  Supple, symmetrical, trachea midline   Lungs:   Clear to auscultation bilaterally; no rales, rhonchi or wheezing; respirations unlabored    Heart[de-identified]   Regular rate and rhythm; no murmur, rub, or gallop  Abdomen:   Soft, non-tender, non-distended; normal bowel sounds; no masses, no organomegaly    Genitalia:   Deferred    Rectal:   Deferred    Extremities:  No cyanosis, clubbing or edema    Pulses:  2+ and symmetric    Skin:  No jaundice, rashes, or lesions    Lymph nodes:  No palpable cervical lymphadenopathy        Lab Results:   No visits with results within 1 Day(s) from this visit  Latest known visit with results is:   Office Visit on 05/22/2022   Component Date Value   • SARS-CoV-2 05/22/2022 Negative    • INFLUENZA A PCR 05/22/2022 Negative    • INFLUENZA B PCR 05/22/2022 Negative          Radiology Results:   No results found

## 2023-03-10 ENCOUNTER — CONSULT (OUTPATIENT)
Dept: NEUROLOGY | Facility: CLINIC | Age: 58
End: 2023-03-10

## 2023-03-10 VITALS
RESPIRATION RATE: 16 BRPM | BODY MASS INDEX: 37.4 KG/M2 | WEIGHT: 238.8 LBS | DIASTOLIC BLOOD PRESSURE: 70 MMHG | SYSTOLIC BLOOD PRESSURE: 124 MMHG | HEART RATE: 91 BPM | OXYGEN SATURATION: 97 % | TEMPERATURE: 97.6 F

## 2023-03-10 DIAGNOSIS — R51.9 CHRONIC INTRACTABLE HEADACHE: Primary | ICD-10-CM

## 2023-03-10 DIAGNOSIS — G89.29 CHRONIC INTRACTABLE HEADACHE: Primary | ICD-10-CM

## 2023-03-10 DIAGNOSIS — G44.84 EXERTIONAL HEADACHE: ICD-10-CM

## 2023-03-10 RX ORDER — RIMEGEPANT SULFATE 75 MG/75MG
75 TABLET, ORALLY DISINTEGRATING ORAL EVERY OTHER DAY
Qty: 16 TABLET | Refills: 11 | Status: SHIPPED | OUTPATIENT
Start: 2023-03-10

## 2023-03-10 RX ORDER — ACETAZOLAMIDE 250 MG/1
250 TABLET ORAL 2 TIMES DAILY
Qty: 60 TABLET | Refills: 2 | Status: SHIPPED | OUTPATIENT
Start: 2023-03-10

## 2023-03-10 NOTE — PATIENT INSTRUCTIONS
Headache/migraine treatment:   - When you have a moderate to severe headache, you should seek rest, initiate relaxation and apply cold compresses to the head  Abortive medications (for immediate treatment of a headache): You may take Tylenol (limit of 2,500 mg/daily) for headaches but you should limit these to no more than 3 times a week to avoid medication overuse/rebound headaches  Do not use your back pain medications (Vicodin/Methocarbamol) for your headaches  Limit Tylenol (combined daily dose) to no more than 2500 mg/day, no more than 3 times per week  Use Nurtec 75 mg every other day  You may continue to use Rizatriptan as needed no more than 3 times per week        Over the counter preventive supplements for headaches/migraines   (to take every day to help prevent headaches - not to take at the time of headache):  [x] Magnesium 500mg daily (If any diarrhea or upset stomach, decrease dose  as tolerated)- you may hold off on this for now  [x] Riboflavin (Vitamin B2) 400mg daily (FYI B2 may make your urine bright/neon yellow)     Prescription preventive medications for headaches/migraines   (to take every day to help prevent headaches - not to take at the time of headache):    Start Acetazolamide 250 mg twice a day; contact the office in 10 days with an update if tolerating well and headaches improving we can consider increasing further to 500 mg twice a day  Continue on for at least 3-4 weeks; if no improvement at that time contact the office for an alternative     *Typically these types of medications take time untill you see the benefit, although some may see improvement in days, often it may take weeks, especially if the medication is being titrated up to a beneficial level  Please contact us if there are any concerns or questions regarding the medication  Self-Monitoring:  [x] Headache calendar  Each day juliane a number from 0-10 indicating if there was a headache and how bad it was    This can be used to monitor gradual improvement and is helpful to make medication adjustments  You can do this on paper or there is an [employer] for a smart phone called "Migraine e Diary"  Lifestyle Recommendations:  [x] SLEEP - Maintain a regular sleep schedule: Adults need at least 7-8 hours of uninterrupted a night  Maintain good sleep hygiene:  Going to bed and waking up at consistent times, avoiding excessive daytime naps, avoiding caffeinated beverages in the evening, avoid excessive stimulation in the evening and generally using bed primarily for sleeping  One hour before bedtime would recommend turning lights down lower, decreasing your activity (may read quietly, listen to music at a low volume)  When you get into bed, should eliminate all technology (no texting, emailing, playing with your phone, iPad or tablet in bed)  [x] HYDRATION - Maintain good hydration  Drink  2L of fluid a day (4 typical small water bottles)  [x] DIET - Maintain good nutrition  In particular don't skip meals and try and eat healthy balanced meals regularly  [x] TRIGGERS - Look for other triggers and avoid them: Limit caffeine to 1-2 cups a day or less  Avoid dietary triggers that you have noticed bring on your headaches (this could include aged cheese, peanuts, MSG, aspartame and nitrates)  [x] EXERCISE - physical exercise as we all know is good for you in many ways, and not only is good for your heart, but also is beneficial for your mental health, cognitive health and  chronic pain/headaches  I would encourage at the least 5 days of physical exercise weekly for at least 30 minutes  Education and Follow-up  [x] Please call with any questions or concerns  Of course if any new concerning symptoms go to the emergency department    [x] Follow up in 6 weeks, sooner if needed  [x] Complete CTA Head and Neck  [x] Complete lab work   [x] Consider sleep study
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
## 2023-03-11 PROBLEM — G89.29 CHRONIC INTRACTABLE HEADACHE: Status: ACTIVE | Noted: 2023-03-11

## 2023-03-11 PROBLEM — R51.9 CHRONIC INTRACTABLE HEADACHE: Status: ACTIVE | Noted: 2023-03-11

## 2023-03-12 NOTE — ASSESSMENT & PLAN NOTE
Teo Swanson is a 61year old male who presents to the office in consultation for headaches referred by his pcp Chandra Park MD  He developed severe headaches, diarrhea and body aches 2 months ago  Since that time he continues with diarrhea and headaches  He is being worked up by GI for infectious etiologies of diarrhea and has pending stool studies  His recent CBC is not concerning for an active infection  His headaches continue to be severe and occur every other day  He reports using Rizatriptan, Hydrocodone, Methocarbamol, and excess amounts of Tylenol upwards of 4,000-5,000 mg per day (combined) on a daily basis  His neurologic exam is intact with no focal motor or sensory deficits  He does report poor, interrupted sleep of 4-5 hours per night  His recent labs (CBC and CMP) were unremarkable but his Lipid panel was significantly elevated  His MRI Brain and CTH did reveal chronic microangiopathic changes and 2 adjacent foci of increased susceptibility that could be seen in hemosiderin deposition or mineralization, as well as empty sella  We discussed possible etiologies of his intractable headaches today including infectious etiology considering their onset was associated with body aches and diarrhea vs idiopathic intracranial hypertension (IIH) supported by his MRI findings of empty sella and mild cortical sulci and ventricular dilation vs medication rebound headache given his current overuse of multiple medications but most concerning is his excess use of Tylenol of 4-5 g per day  Other etiologies that cannot yet be excluded are other infectious, inflammatory or vascular causes  As such, I have recommended that he complete stool studies as requested by GI, and complete additional serological work up to include Lyme ab, sed rate, spep, crp, and cmp to recheck his current liver function   I have also requested he complete a CTA head and neck to evaluate for the severity of his intra/extra-cranial stenosis given his recent uncontrolled lipid panel and CTH noting calcified atherosclerotic changes  This will also rule out aneurysm or other vascular cause for his exertional headaches and visual disturbance and evaluate for any source of hemosiderin deposition vs mineralization  We did discuss the utility of lumbar puncture in determining if there is intracranial hypertension, however he prefers to defer this at this time  We also discussed the utility of a sleep study to evaluate for DIOGO or other sleep disorder, but he prefers to defer this at this time  We discussed several options for treatment of his headaches today but I emphasized the importance of limiting his Tylenol use moving forward  We had a lengthy conversation about medication overuse/rebound headaches  After discussing several options, he is agreeable to a trial of Acetazolamide  I have asked him to take 250 mg twice a day and contact the office in 10 days with an update  If he is tolerating this well and headaches are improving we can consider increasing further to 500 mg twice a day  If have asked him to continue on this for at least 3-4 weeks; if no improvement at that time he is to contact the office for an alternative preventative medication  He understands Acetazolamide should improve headaches if they are related to IIH  To abort his headaches I have asked him to use Nurtec 75 mg, as there is much less risk for rebound compared to alternative triptans  He understands he may not use more than 75 mg per day and cannot use this more often than every other day  As a back up he may use Rizatriptan but again was cautioned not to use this more than 3 times per week  I have asked him to refrain from using his vicodin and methocarbamol prescribed for his chronic back pain for his acute headaches  Other lifestyle recommendations discussed are outlined below with remaining plan  He will follow up closely in 6 weeks      Plan:    Headache/migraine treatment: - When you have a moderate to severe headache, you should seek rest, initiate relaxation and apply cold compresses to the head  Abortive medications (for immediate treatment of a headache): You may take Tylenol (limit of 2,500 mg/daily) for headaches but you should limit these to no more than 3 times a week to avoid medication overuse/rebound headaches  1  Do not use your back pain medications (Vicodin/Methocarbamol) for your headaches  2  Limit Tylenol (combined daily dose) to no more than 2500 mg/day, no more than 3 times per week  3  Use Nurtec 75 mg as needed up to once every other day   4  You may continue to use Rizatriptan as needed no more than 3 times per week        Over the counter preventive supplements for headaches/migraines   (to take every day to help prevent headaches - not to take at the time of headache):  [x] Magnesium 500mg daily (If any diarrhea or upset stomach, decrease dose  as tolerated)- you may hold off on this for now due to current diarrhea  [x] Riboflavin (Vitamin B2) 400mg daily (FYI B2 may make your urine bright/neon yellow)     Prescription preventive medications for headaches/migraines   (to take every day to help prevent headaches - not to take at the time of headache):    Start Acetazolamide 250 mg twice a day; contact the office in 10 days with an update if tolerating well and headaches improving we can consider increasing further to 500 mg twice a day  Continue on for at least 3-4 weeks; if no improvement at that time contact the office for an alternative     *Typically these types of medications take time untill you see the benefit, although some may see improvement in days, often it may take weeks, especially if the medication is being titrated up to a beneficial level  Please contact us if there are any concerns or questions regarding the medication  Self-Monitoring:  [x] Headache calendar   Each day juliane a number from 0-10 indicating if there was a headache and how bad it was  This can be used to monitor gradual improvement and is helpful to make medication adjustments  You can do this on paper or there is an [employer] for a smart phone called "Migraine e Diary"  Lifestyle Recommendations:  [x] SLEEP - Maintain a regular sleep schedule: Adults need at least 7-8 hours of uninterrupted a night  Maintain good sleep hygiene:  Going to bed and waking up at consistent times, avoiding excessive daytime naps, avoiding caffeinated beverages in the evening, avoid excessive stimulation in the evening and generally using bed primarily for sleeping  One hour before bedtime would recommend turning lights down lower, decreasing your activity (may read quietly, listen to music at a low volume)  When you get into bed, should eliminate all technology (no texting, emailing, playing with your phone, iPad or tablet in bed)  [x] HYDRATION - Maintain good hydration  Drink  2L of fluid a day (4 typical small water bottles)  [x] DIET - Maintain good nutrition  In particular don't skip meals and try and eat healthy balanced meals regularly  [x] TRIGGERS - Look for other triggers and avoid them: Limit caffeine to 1-2 cups a day or less  Avoid dietary triggers that you have noticed bring on your headaches (this could include aged cheese, peanuts, MSG, aspartame and nitrates)  [x] EXERCISE - physical exercise as we all know is good for you in many ways, and not only is good for your heart, but also is beneficial for your mental health, cognitive health and  chronic pain/headaches  I would encourage at the least 5 days of physical exercise weekly for at least 30 minutes  Education and Follow-up  [x] Please call with any questions or concerns  Of course if any new concerning symptoms go to the emergency department    [x] Follow up in 6 weeks, sooner if needed  [x] Complete CTA Head and Neck  [x] Complete lab work   [x] Consider sleep study

## 2023-03-27 DIAGNOSIS — G89.29 CHRONIC INTRACTABLE HEADACHE, UNSPECIFIED HEADACHE TYPE: Primary | ICD-10-CM

## 2023-03-27 DIAGNOSIS — R51.9 CHRONIC INTRACTABLE HEADACHE, UNSPECIFIED HEADACHE TYPE: Primary | ICD-10-CM

## 2023-03-27 NOTE — TELEPHONE ENCOUNTER
HPI     14 mo female presents today with her mother (Charley) and grandfather for   strabismus eval. Today, mom reports both eyes turns inward occasionally.   Pt older sister previously has strab sx with Dr. Doshi. Charley states that   she has not notice any other concerning visual symptoms.          Last edited by Kendal Christianson MD on 9/25/2020  3:55 PM. (History)            Assessment /Plan     For exam results, see Encounter Report.    Accommodative esotropia of both eyes      Discussed findings with mom today.   Full Crx given   Explained the importance of wearing glasses mom. Explained to mom to call and let us know if she is rejecting glasses and we can try atropine drop for 1 -2 weeks for acceptance     RTC  2-3 months recheck strab in glasses     This service was scribed by Anastasiya Ford for and in the presence of Dr. Christianson who personally performed this service.    Anastasiya Ford COA    Kendal Christianson MD                    Received  Transcription:    Catina Bains, date of birth [birth year]  I am returning your call regarding the tinnitus issue  Please call me back after 2pm today  2 PM to 5 PM would be the best hours  Thank you  Pt of Dr Bianca Rogers  Forwarded this message to her team     Left  advising we will try to call back today between 2pm- 4:30pm or he can return our call when be becomes available      Last OV 3-10-23   Next OV 5-19-23    # [phone]

## 2023-03-27 NOTE — TELEPHONE ENCOUNTER
Pt left message   I was seen on 3/10 by [facility], I now have constant tinnitus and is getting more and more severe       Called pt back to get more information and requested call back

## 2023-03-28 ENCOUNTER — HOSPITAL ENCOUNTER (OUTPATIENT)
Dept: CT IMAGING | Facility: HOSPITAL | Age: 58
Discharge: HOME | End: 2023-03-28

## 2023-03-28 DIAGNOSIS — G89.29 CHRONIC INTRACTABLE HEADACHE: ICD-10-CM

## 2023-03-28 DIAGNOSIS — G44.84 EXERTIONAL HEADACHE: ICD-10-CM

## 2023-03-28 DIAGNOSIS — R51.9 CHRONIC INTRACTABLE HEADACHE: ICD-10-CM

## 2023-03-28 RX ADMIN — IOHEXOL 85 ML: 350 INJECTION, SOLUTION INTRAVENOUS at 18:37

## 2023-03-30 RX ORDER — RIZATRIPTAN BENZOATE 10 MG/1
TABLET ORAL
Qty: 9 TABLET | Refills: 0 | Status: SHIPPED | OUTPATIENT
Start: 2023-03-30

## 2023-03-30 NOTE — TELEPHONE ENCOUNTER
Marko Ham,  date of birth, [birth year]  Returning numerous messages about migraines and tinnitus  Please try me again  [phone]  I am having a migraine and ringing ears  I will put my phone ringer on and hope to hear from you  So we can stop playing phone tag  Thank you, bye  Called pt c/o ongoing migraines since Jan  Tinnitus started this month, about few weeks ago    Location/Description: Right between his eyes and spread over his forehead-bilateral in the frontal area  It is pretty much constant  Pain scale: 7/10  Took vicodin  Pain went down to 5  HA are going away but not the tinnitus    Associated symptoms: sensitivity to light and sound, off balance at times    Precipitating factors: unknown    Alleviating factors: Vicodin, robaxin ordered by another   provider     Current migraine medications are confirmed as:  Nurtec 75 mg qod  Reducing the length of his migraines  Aryan was bad prior starting Nutec  Migraines lasting about 3 days  Feb, better  March, even better,lasting   about 3 hrs which is good  Nurtec definitely reduced the intensity  Diamox 250 mg bid  Intensity improved since starting diamox but they still get pretty bad at times  Before this med, it was pretty much all day long  Rizatriptan 10 mg prn as ordered  This med helps but he has been out of this med  Requesting refill  Rx entered  Pls sign off  Thanks  B2, multivitamin and mag daily   Started otc Lipo-flavonoid daily for tinnitus  Which helped slightly      No recent illness    He has ENT appt scheduled on 4/11/23    Cb [phone], ok to leave a detailed message

## 2023-03-30 NOTE — TELEPHONE ENCOUNTER
Carol refilled  His CTA is not back yet so I cannot review results yet  I do not see the results of his labs  He should get this done if he hasnt already  He should not be using Vicodin for his headaches- we already discussed medication overuse/rebound headaches  If he is tolerating Diamox at 250 mg bid, we can increase this to 500 mg bid if he would like?

## 2023-03-30 NOTE — TELEPHONE ENCOUNTER
Called pt again to triage regarding his symptoms described below, left message requesting a call back  My chart message sent to pt

## 2023-04-03 NOTE — TELEPHONE ENCOUNTER
Weird dreams are not a common side effect of either medication  If he would like, he can stop nurtec first to see if they resolve  If not then we can consider discontinuing diamox  If they are not severe, then he can continue on both while we await the rest of his work up

## 2023-04-03 NOTE — TELEPHONE ENCOUNTER
Left detailed message advising pt of Lillian's response and recommendations  Advised pt to call back if further questions

## 2023-04-04 ENCOUNTER — APPOINTMENT (OUTPATIENT)
Dept: LAB | Facility: CLINIC | Age: 58
End: 2023-04-04

## 2023-04-04 DIAGNOSIS — R51.9 CHRONIC INTRACTABLE HEADACHE: ICD-10-CM

## 2023-04-04 DIAGNOSIS — G89.29 CHRONIC INTRACTABLE HEADACHE: ICD-10-CM

## 2023-04-04 LAB
ALBUMIN SERPL BCP-MCNC: 4.2 G/DL (ref 3.5–5)
ALP SERPL-CCNC: 71 U/L (ref 46–116)
ALT SERPL W P-5'-P-CCNC: 35 U/L (ref 12–78)
ANION GAP SERPL CALCULATED.3IONS-SCNC: 3 MMOL/L (ref 4–13)
AST SERPL W P-5'-P-CCNC: 24 U/L (ref 5–45)
B BURGDOR IGG+IGM SER-ACNC: <0.2 AI
BILIRUB SERPL-MCNC: 0.56 MG/DL (ref 0.2–1)
BUN SERPL-MCNC: 22 MG/DL (ref 5–25)
CALCIUM SERPL-MCNC: 9 MG/DL (ref 8.3–10.1)
CHLORIDE SERPL-SCNC: 109 MMOL/L (ref 96–108)
CO2 SERPL-SCNC: 24 MMOL/L (ref 21–32)
CREAT SERPL-MCNC: 1.19 MG/DL (ref 0.6–1.3)
CRP SERPL QL: 3.2 MG/L
ERYTHROCYTE [SEDIMENTATION RATE] IN BLOOD: 8 MM/HOUR (ref 0–19)
GFR SERPL CREATININE-BSD FRML MDRD: 66 ML/MIN/1.73SQ M
GLUCOSE SERPL-MCNC: 132 MG/DL (ref 65–140)
POTASSIUM SERPL-SCNC: 3.8 MMOL/L (ref 3.5–5.3)
PROT SERPL-MCNC: 7.4 G/DL (ref 6.4–8.4)
SODIUM SERPL-SCNC: 136 MMOL/L (ref 135–147)

## 2023-04-05 LAB
ALBUMIN SERPL ELPH-MCNC: 4.41 G/DL (ref 3.5–5)
ALBUMIN SERPL ELPH-MCNC: 62.1 % (ref 52–65)
ALPHA1 GLOB SERPL ELPH-MCNC: 0.27 G/DL (ref 0.1–0.4)
ALPHA1 GLOB SERPL ELPH-MCNC: 3.8 % (ref 2.5–5)
ALPHA2 GLOB SERPL ELPH-MCNC: 0.78 G/DL (ref 0.4–1.2)
ALPHA2 GLOB SERPL ELPH-MCNC: 11 % (ref 7–13)
BETA GLOB ABNORMAL SERPL ELPH-MCNC: 0.4 G/DL (ref 0.4–0.8)
BETA1 GLOB SERPL ELPH-MCNC: 5.7 % (ref 5–13)
BETA2 GLOB SERPL ELPH-MCNC: 5.2 % (ref 2–8)
BETA2+GAMMA GLOB SERPL ELPH-MCNC: 0.37 G/DL (ref 0.2–0.5)
GAMMA GLOB ABNORMAL SERPL ELPH-MCNC: 0.87 G/DL (ref 0.5–1.6)
GAMMA GLOB SERPL ELPH-MCNC: 12.2 % (ref 12–22)
IGG/ALB SER: 1.64 {RATIO} (ref 1.1–1.8)
PROT PATTERN SERPL ELPH-IMP: NORMAL
PROT SERPL-MCNC: 7.1 G/DL (ref 6.4–8.2)

## 2023-09-13 ENCOUNTER — RA CDI HCC (OUTPATIENT)
Dept: OTHER | Facility: HOSPITAL | Age: 58
End: 2023-09-13

## 2023-09-13 NOTE — PROGRESS NOTES
[id number]  [address] [facility] coding opportunities          Chart Reviewed number of suggestions sent to Provider: 1     Patients Insurance        Commercial Insurance: [employer]

## 2023-09-18 ENCOUNTER — OFFICE VISIT (OUTPATIENT)
Dept: NEUROLOGY | Facility: CLINIC | Age: 58
End: 2023-09-18
Payer: COMMERCIAL

## 2023-09-18 VITALS
HEIGHT: 67 IN | SYSTOLIC BLOOD PRESSURE: 138 MMHG | OXYGEN SATURATION: 98 % | WEIGHT: 233.9 LBS | DIASTOLIC BLOOD PRESSURE: 90 MMHG | TEMPERATURE: 98.8 F | BODY MASS INDEX: 36.71 KG/M2 | RESPIRATION RATE: 20 BRPM | HEART RATE: 86 BPM

## 2023-09-18 DIAGNOSIS — G43.909 EPISODIC MIGRAINE: Primary | ICD-10-CM

## 2023-09-18 PROCEDURE — 99214 OFFICE O/P EST MOD 30 MIN: CPT

## 2023-09-18 RX ORDER — ACETAZOLAMIDE 250 MG/1
250 TABLET ORAL 2 TIMES DAILY
Qty: 60 TABLET | Refills: 2 | Status: SHIPPED | OUTPATIENT
Start: 2023-09-18

## 2023-09-18 RX ORDER — RIMEGEPANT SULFATE 75 MG/75MG
TABLET, ORALLY DISINTEGRATING ORAL
Qty: 8 TABLET | Refills: 3 | Status: SHIPPED | OUTPATIENT
Start: 2023-09-18

## 2023-09-18 NOTE — PROGRESS NOTES
Review of Systems   Constitutional: Negative for appetite change, fatigue and fever. HENT: Negative. Negative for hearing loss, tinnitus, trouble swallowing and voice change. When watching Tv has to turn the volume down because it causes him a HA   Eyes: Negative. Negative for photophobia, pain and visual disturbance. Respiratory: Negative. Negative for shortness of breath. Cardiovascular: Negative. Negative for palpitations. Gastrointestinal: Negative. Negative for nausea and vomiting. Endocrine: Negative. Negative for cold intolerance. Genitourinary: Negative. Negative for dysuria, frequency and urgency. Musculoskeletal: Negative for back pain, gait problem, myalgias and neck pain. Skin: Negative. Negative for rash. Allergic/Immunologic: Negative. Neurological: Positive for dizziness, light-headedness, numbness (right fingertips ) and headaches (have improved after March until 3 weeks ago started geting them every other day ). Negative for tremors, seizures, syncope, facial asymmetry, speech difficulty and weakness. Hematological: Negative. Does not bruise/bleed easily. Psychiatric/Behavioral: Positive for confusion (trouble thinking of words and difficulty pronouncing word). Negative for hallucinations and sleep disturbance. All other systems reviewed and are negative.
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
## 2023-09-18 NOTE — ASSESSMENT & PLAN NOTE
Trudy Graves is a 60year old male who is known to the practice for chronic intractable headaches since January 2023 concerning for idiopathic intracranial hypertension (IIH) supported by his MRI findings of empty sella and mild cortical sulci and ventricular dilation vs medication rebound headache given his overuse of multiple medications but most concerning is his excess use of Tylenol of 4-5 g per day. He returns to the office today and states his headaches were better in March after taking Acetazolamide 500 mg bid x 1 month and then self discontinued this. He continued to be headache free up until about 3 weeks ago his headaches started again. He recently had a migraine that lasted 6 days and hence restarted Acetazolamide 250 mg bid. He has noticed an improvement in his baseline pain. He did go to urgent care and received Toradol IM. He continues to use Tylenol 2500 mg/day without including dosage of acetaminophen in Vicodin. He has not been taking Vicodin for his headaches however. He currently has kidney stones; has had them for many (20+) years. He does not recall ever trying Nurtec and did not find Rizatriptan to be effective in the past.    We discussed today he may continue on low dose acetazolamide 250 mg bid however he was warned of the risk of renal calculi and encouraged to increase his daily hydration. As such, I have asked him to monitor for any symptoms concerning for obstructive renal calculi or nephrolithiasis such as increase in flank pain, fever, chills, hematuria or dysuria. To abort his headaches I have asked him to use Nurtec 75 mg as needed, as there is much less risk for rebound compared to alternative triptans. He did not find rizatriptan to be effective and NSAIDs are contraindicated in his hx of gastric ulcer.  He was asked to consider a sleep study to evaluate for untreated DIOGO and discussed the correlation between untreated DIOGO and increased intracranial pressures, but he prefers to defer at this time. Other lifestyle recommendations discussed are outlined below with remaining plan. He will follow up closely in 6 weeks. Plan:  Headache/migraine treatment:   - When you have a moderate to severe headache, you should seek rest, initiate relaxation and apply cold compresses to the head. Abortive medications (for immediate treatment of a headache): You may take Tylenol (limit of 2,500 mg/daily) for headaches but you should limit these to no more than 3 times a week to avoid medication overuse/rebound headaches. 1. Do not use your back pain medications (Vicodin/Methocarbamol) for your headaches  2. Limit Tylenol (combined daily dose) to no more than 2500 mg/day, no more than 3 times per week  3. Use Nurtec 75 mg as needed up to once every other day - [email or web address].com for copay card. Over the counter preventive supplements for headaches/migraines   (to take every day to help prevent headaches - not to take at the time of headache):  [x] Magnesium 500mg daily (If any diarrhea or upset stomach, decrease dose  as tolerated)- you may hold off on this for now due to current diarrhea  [x] Riboflavin (Vitamin B2) 400mg daily (FYI B2 may make your urine bright/neon yellow)     Prescription preventive medications for headaches/migraines   (to take every day to help prevent headaches - not to take at the time of headache):     Continue Acetazolamide 250 mg twice a day  If any increase in flank pain, fever, chills, blood in urine, or other indication that kidney stones are worse then discontinue  Increase your hydration to 4-5 bottles of water per day. *Typically these types of medications take time untill you see the benefit, although some may see improvement in days, often it may take weeks, especially if the medication is being titrated up to a beneficial level. Please contact us if there are any concerns or questions regarding the medication.       Self-Monitoring:  [x] Headache calendar. Each day juliane a number from 0-10 indicating if there was a headache and how bad it was. This can be used to monitor gradual improvement and is helpful to make medication adjustments. You can do this on paper or there is an CHANCE for a smart phone called "Migraine e Diary". Lifestyle Recommendations:  [x] SLEEP - Maintain a regular sleep schedule: Adults need at least 7-8 hours of uninterrupted a night. Maintain good sleep hygiene:  Going to bed and waking up at consistent times, avoiding excessive daytime naps, avoiding caffeinated beverages in the evening, avoid excessive stimulation in the evening and generally using bed primarily for sleeping. One hour before bedtime would recommend turning lights down lower, decreasing your activity (may read quietly, listen to music at a low volume). When you get into bed, should eliminate all technology (no texting, emailing, playing with your phone, iPad or tablet in bed). [x] HYDRATION - Maintain good hydration. Drink  2L of fluid a day (4 typical small water bottles)  [x] DIET - Maintain good nutrition. In particular don't skip meals and try and eat healthy balanced meals regularly. [x] TRIGGERS - Look for other triggers and avoid them: Limit caffeine to 1-2 cups a day or less. Avoid dietary triggers that you have noticed bring on your headaches (this could include aged cheese, peanuts, MSG, aspartame and nitrates). [x] EXERCISE - physical exercise as we all know is good for you in many ways, and not only is good for your heart, but also is beneficial for your mental health, cognitive health and  chronic pain/headaches. I would encourage at the least 5 days of physical exercise weekly for at least 30 minutes. Education and Follow-up  [x] Please call with any questions or concerns. Of course if any new concerning symptoms go to the emergency department.   [x] Follow up in 6 weeks, sooner if needed  [x] Consider sleep study

## 2023-09-18 NOTE — PATIENT INSTRUCTIONS
Headache/migraine treatment:   - When you have a moderate to severe headache, you should seek rest, initiate relaxation and apply cold compresses to the head. Abortive medications (for immediate treatment of a headache): You may take Tylenol (limit of 2,500 mg/daily) for headaches but you should limit these to no more than 3 times a week to avoid medication overuse/rebound headaches. Do not use your back pain medications (Vicodin/Methocarbamol) for your headaches  Limit Tylenol (combined daily dose) to no more than 2500 mg/day, no more than 3 times per week  Use Nurtec 75 mg as needed up to once every other day - Nurtec.com for copay card. Over the counter preventive supplements for headaches/migraines   (to take every day to help prevent headaches - not to take at the time of headache):  [x] Magnesium 500mg daily (If any diarrhea or upset stomach, decrease dose  as tolerated)- you may hold off on this for now due to current diarrhea  [x] Riboflavin (Vitamin B2) 400mg daily (FYI B2 may make your urine bright/neon yellow)     Prescription preventive medications for headaches/migraines   (to take every day to help prevent headaches - not to take at the time of headache):     Continue Acetazolamide 250 mg twice a day  If any increase in flank pain, fever, chills, blood in urine, or other indication that kidney stones are worse then discontinue  Increase your hydration to 4-5 bottles of water per day. *Typically these types of medications take time untill you see the benefit, although some may see improvement in days, often it may take weeks, especially if the medication is being titrated up to a beneficial level. Please contact us if there are any concerns or questions regarding the medication. Self-Monitoring:  [x] Headache calendar. Each day juliane a number from 0-10 indicating if there was a headache and how bad it was.   This can be used to monitor gradual improvement and is helpful to make medication adjustments. You can do this on paper or there is an CHANCE for a smart phone called "Migraine e Diary". Lifestyle Recommendations:  [x] SLEEP - Maintain a regular sleep schedule: Adults need at least 7-8 hours of uninterrupted a night. Maintain good sleep hygiene:  Going to bed and waking up at consistent times, avoiding excessive daytime naps, avoiding caffeinated beverages in the evening, avoid excessive stimulation in the evening and generally using bed primarily for sleeping. One hour before bedtime would recommend turning lights down lower, decreasing your activity (may read quietly, listen to music at a low volume). When you get into bed, should eliminate all technology (no texting, emailing, playing with your phone, iPad or tablet in bed). [x] HYDRATION - Maintain good hydration. Drink  2L of fluid a day (4 typical small water bottles)  [x] DIET - Maintain good nutrition. In particular don't skip meals and try and eat healthy balanced meals regularly. [x] TRIGGERS - Look for other triggers and avoid them: Limit caffeine to 1-2 cups a day or less. Avoid dietary triggers that you have noticed bring on your headaches (this could include aged cheese, peanuts, MSG, aspartame and nitrates). [x] EXERCISE - physical exercise as we all know is good for you in many ways, and not only is good for your heart, but also is beneficial for your mental health, cognitive health and  chronic pain/headaches. I would encourage at the least 5 days of physical exercise weekly for at least 30 minutes. Education and Follow-up  [x] Please call with any questions or concerns. Of course if any new concerning symptoms go to the emergency department.   [x] Follow up in 6 weeks, sooner if needed  [x] Consider sleep study

## 2023-09-20 ENCOUNTER — TELEPHONE (OUTPATIENT)
Dept: NEUROLOGY | Facility: CLINIC | Age: 58
End: 2023-09-20

## 2023-09-20 NOTE — TELEPHONE ENCOUNTER
Received vm from 9/19 at 11:26am-A hi, celia dong calling date of birth, 1965. I was just in to see marilyn yesterday. I believe her 1st name is, my symptoms have worsen. And if you could have somebody return my call, I'd greatly appreciate it. [phone]. Thanks bye.  -----------------------------------  Left message for pt to call office back and leave more details as to his symptoms and his medication doses.

## 2023-09-21 NOTE — TELEPHONE ENCOUNTER
9/20 11:14    Pt left a VM returning our call. Transcribed VM:   Hi, Robin Persons. Date of birth [birth year]. Returning Aide's call regarding migraines. Please give me a call back when you get this message.  Thanks, bye.

## 2023-09-29 NOTE — TELEPHONE ENCOUNTER
Left message for pt requesting additional details on how he is feeling and what medications he has tried.

## 2023-10-04 NOTE — TELEPHONE ENCOUNTER
Left detailed msg (per consent in chart) requesting return call w/symptoms and meds taken/doses if pt still experiencing migraine. Second attempt. Letter sent.

## 2023-10-30 ENCOUNTER — OFFICE VISIT (OUTPATIENT)
Dept: NEUROLOGY | Facility: CLINIC | Age: 58
End: 2023-10-30
Payer: COMMERCIAL

## 2023-10-30 VITALS
OXYGEN SATURATION: 98 % | HEART RATE: 64 BPM | SYSTOLIC BLOOD PRESSURE: 132 MMHG | BODY MASS INDEX: 38.17 KG/M2 | HEIGHT: 67 IN | DIASTOLIC BLOOD PRESSURE: 84 MMHG | WEIGHT: 243.2 LBS | TEMPERATURE: 98.2 F

## 2023-10-30 DIAGNOSIS — G93.2 IIH (IDIOPATHIC INTRACRANIAL HYPERTENSION): Primary | ICD-10-CM

## 2023-10-30 PROCEDURE — 99215 OFFICE O/P EST HI 40 MIN: CPT
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
## 2023-10-30 NOTE — ASSESSMENT & PLAN NOTE
Sydney Myers is a 60year old male who is known to the practice for chronic intractable headaches since January 2023 concerning for idiopathic intracranial hypertension (IIH) supported by his MRI findings of empty sella and mild cortical sulci and ventricular dilation vs overlay of medication rebound headache given his overuse of multiple medications but most concerning is his excess use of Tylenol of 4-5 g per day. He returns to the office today and states his headaches were better in March 2023 after taking Acetazolamide 500 mg bid x 1 month and then self discontinued this. He then resumed this in September 2023 after having more headaches and migraines and once they improved again self discontinued it once more. He returns today and reports no migraines in the last 1 month, but milder headaches 1-2 times per week with no associated symptoms. He denies any blurred or double vision, or other visual disturbance although he does report bilateral retro orbital pressure. Unfortunately he  also continues to use Tylenol > 2500 mg/day (without including dosage of acetaminophen in Vicodin), although overall this is less than his baseline of 4-5 grams per day previously. He has found Nurtec to be effective as needed at least initially but then has reoccurrence the next day for 3-4 days. We did discuss the option of using Nurtec QOD for migraine prevention, but he declines this today stating he would prefer to use it as needed. We did discuss that for acetazolamide to be effective this needs to be used daily and not as needed. We discussed he may resume a low dose acetazolamide 250 mg bid although we did discuss the risks of renal calculi and I encouraged him to increase his daily hydration. If he resumes I have asked him to monitor for any symptoms concerning for obstructive renal calculi or nephrolithiasis such as increase in flank pain, fever, chills, hematuria or dysuria.  However, he tells me he would prefer to continue off of Acetazolamide at this time as he is not having any migraines and feels his milder headaches are tolerable. I have encouraged him to track his migraines, headaches, and any visual disturbance and if there is any increase in frequency or severity he could consider resuming Acetazolamide. Otherwise we discussed natural ways to lower intracranial pressure such as weight loss and correcting DIOGO. He was asked to consider a sleep study to evaluate for untreated DIOGO and discussed the correlation between untreated DIOGO and increased intracranial pressures, but he prefers to defer at this time. He will follow up in 3 months; sooner if needed. Plan:  Headache/migraine treatment:   - When you have a moderate to severe headache, you should seek rest, initiate relaxation and apply cold compresses to the head. Abortive medications (for immediate treatment of a headache): You may take Tylenol (limit of 2,500 mg/daily) for headaches but you should limit these to no more than 3 times a week to avoid medication overuse/rebound headaches. Do not use your back pain medications (Vicodin/Methocarbamol) for your headaches  Limit Tylenol (combined daily dose) to no more than 2500 mg/day, no more than 3 times per week  Use Nurtec 75 mg as needed up to once every other day - Nurtec.com for copay card. Over the counter preventive supplements for headaches/migraines   (to take every day to help prevent headaches - not to take at the time of headache):  [x] Magnesium 500mg daily (If any diarrhea or upset stomach, decrease dose  as tolerated)- you may hold off on this for now due to current diarrhea  [x] Riboflavin (Vitamin B2) 400mg daily (FYI B2 may make your urine bright/neon yellow)     Prescription preventive medications for headaches/migraines   (to take every day to help prevent headaches - not to take at the time of headache):      Will plan to have you continue off of Acetazolamide at this time; if any increase in migraines, headaches, or visual disturbances then will consider resuming. *Typically these types of medications take time untill you see the benefit, although some may see improvement in days, often it may take weeks, especially if the medication is being titrated up to a beneficial level. Please contact us if there are any concerns or questions regarding the medication. Self-Monitoring:  [x] Headache calendar. Each day juliane a number from 0-10 indicating if there was a headache and how bad it was. This can be used to monitor gradual improvement and is helpful to make medication adjustments. You can do this on paper or there is an CHANCE for a smart phone called "Migraine e Diary". Lifestyle Recommendations:  [x] SLEEP - Maintain a regular sleep schedule: Adults need at least 7-8 hours of uninterrupted a night. Maintain good sleep hygiene:  Going to bed and waking up at consistent times, avoiding excessive daytime naps, avoiding caffeinated beverages in the evening, avoid excessive stimulation in the evening and generally using bed primarily for sleeping. One hour before bedtime would recommend turning lights down lower, decreasing your activity (may read quietly, listen to music at a low volume). When you get into bed, should eliminate all technology (no texting, emailing, playing with your phone, iPad or tablet in bed). [x] HYDRATION - Maintain good hydration. Drink  2L of fluid a day (4 typical small water bottles)  [x] DIET - Maintain good nutrition. In particular don't skip meals and try and eat healthy balanced meals regularly. [x] TRIGGERS - Look for other triggers and avoid them: Limit caffeine to 1-2 cups a day or less. Avoid dietary triggers that you have noticed bring on your headaches (this could include aged cheese, peanuts, MSG, aspartame and nitrates).   [x] EXERCISE - physical exercise as we all know is good for you in many ways, and not only is good for your heart, but also is beneficial for your mental health, cognitive health and  chronic pain/headaches. I would encourage at the least 5 days of physical exercise weekly for at least 30 minutes. Education and Follow-up  [x] Please call with any questions or concerns. Of course if any new concerning symptoms go to the emergency department.   [x] Follow up in 3 months, sooner if needed  [x] Consider sleep study

## 2023-10-30 NOTE — PROGRESS NOTES
Review of Systems   Constitutional:  Negative for appetite change, fatigue and fever. HENT: Negative. Negative for hearing loss, tinnitus, trouble swallowing and voice change. Eyes: Negative. Negative for photophobia, pain and visual disturbance. Respiratory: Negative. Negative for shortness of breath. Cardiovascular: Negative. Negative for palpitations. Gastrointestinal: Negative. Negative for nausea and vomiting. Endocrine: Negative. Negative for cold intolerance. Genitourinary: Negative. Negative for dysuria, frequency and urgency. Musculoskeletal:  Negative for back pain, gait problem, myalgias and neck pain. Skin: Negative. Negative for rash. Allergic/Immunologic: Negative. Neurological:  Positive for headaches. Negative for dizziness, tremors, seizures, syncope, facial asymmetry, speech difficulty, weakness, light-headedness and numbness. Hematological: Negative. Does not bruise/bleed easily. Psychiatric/Behavioral: Negative. Negative for confusion, hallucinations and sleep disturbance. All other systems reviewed and are negative.

## 2023-10-30 NOTE — PATIENT INSTRUCTIONS
Headache/migraine treatment:   - When you have a moderate to severe headache, you should seek rest, initiate relaxation and apply cold compresses to the head. Abortive medications (for immediate treatment of a headache): You may take Tylenol (limit of 2,500 mg/daily) for headaches but you should limit these to no more than 3 times a week to avoid medication overuse/rebound headaches. Do not use your back pain medications (Vicodin/Methocarbamol) for your headaches  Limit Tylenol (combined daily dose) to no more than 2500 mg/day, no more than 3 times per week  Use Nurtec 75 mg as needed up to once every other day - Nurtec.com for copay card. Over the counter preventive supplements for headaches/migraines   (to take every day to help prevent headaches - not to take at the time of headache):  [x] Magnesium 500mg daily (If any diarrhea or upset stomach, decrease dose  as tolerated)- you may hold off on this for now due to current diarrhea  [x] Riboflavin (Vitamin B2) 400mg daily (FYI B2 may make your urine bright/neon yellow)     Prescription preventive medications for headaches/migraines   (to take every day to help prevent headaches - not to take at the time of headache): Will plan to have you continue off of Acetazolamide at this time; if any increase in migraines, headaches, or visual disturbances then will consider resuming. *Typically these types of medications take time untill you see the benefit, although some may see improvement in days, often it may take weeks, especially if the medication is being titrated up to a beneficial level. Please contact us if there are any concerns or questions regarding the medication. Self-Monitoring:  [x] Headache calendar. Each day juliane a number from 0-10 indicating if there was a headache and how bad it was. This can be used to monitor gradual improvement and is helpful to make medication adjustments.  You can do this on paper or there is an CHANCE for a smart phone called "Migraine e Diary". Lifestyle Recommendations:  [x] SLEEP - Maintain a regular sleep schedule: Adults need at least 7-8 hours of uninterrupted a night. Maintain good sleep hygiene:  Going to bed and waking up at consistent times, avoiding excessive daytime naps, avoiding caffeinated beverages in the evening, avoid excessive stimulation in the evening and generally using bed primarily for sleeping. One hour before bedtime would recommend turning lights down lower, decreasing your activity (may read quietly, listen to music at a low volume). When you get into bed, should eliminate all technology (no texting, emailing, playing with your phone, iPad or tablet in bed). [x] HYDRATION - Maintain good hydration. Drink  2L of fluid a day (4 typical small water bottles)  [x] DIET - Maintain good nutrition. In particular don't skip meals and try and eat healthy balanced meals regularly. [x] TRIGGERS - Look for other triggers and avoid them: Limit caffeine to 1-2 cups a day or less. Avoid dietary triggers that you have noticed bring on your headaches (this could include aged cheese, peanuts, MSG, aspartame and nitrates). [x] EXERCISE - physical exercise as we all know is good for you in many ways, and not only is good for your heart, but also is beneficial for your mental health, cognitive health and  chronic pain/headaches. I would encourage at the least 5 days of physical exercise weekly for at least 30 minutes. Education and Follow-up  [x] Please call with any questions or concerns. Of course if any new concerning symptoms go to the emergency department.   [x] Follow up in 3 months, sooner if needed  [x] Consider sleep study

## 2024-04-22 ENCOUNTER — OFFICE VISIT (OUTPATIENT)
Dept: GASTROENTEROLOGY | Facility: AMBULARY SURGERY CENTER | Age: 59
End: 2024-04-22
Payer: COMMERCIAL

## 2024-04-22 VITALS
BODY MASS INDEX: 37.39 KG/M2 | HEART RATE: 118 BPM | DIASTOLIC BLOOD PRESSURE: 84 MMHG | HEIGHT: 67 IN | SYSTOLIC BLOOD PRESSURE: 138 MMHG | OXYGEN SATURATION: 97 % | WEIGHT: 238.2 LBS

## 2024-04-22 DIAGNOSIS — K29.70 GASTRITIS WITHOUT BLEEDING, UNSPECIFIED CHRONICITY, UNSPECIFIED GASTRITIS TYPE: Primary | ICD-10-CM

## 2024-04-22 PROCEDURE — 99213 OFFICE O/P EST LOW 20 MIN: CPT | Performed by: PHYSICIAN ASSISTANT

## 2024-04-22 RX ORDER — FAMOTIDINE 20 MG/1
20 TABLET, FILM COATED ORAL 2 TIMES DAILY
Qty: 60 TABLET | Refills: 2 | Status: SHIPPED | OUTPATIENT
Start: 2024-04-22

## 2024-04-22 RX ORDER — SUCRALFATE 1 G/1
1 TABLET ORAL
Qty: 90 TABLET | Refills: 1 | Status: SHIPPED | OUTPATIENT
Start: 2024-04-22

## 2024-04-22 RX ORDER — OMEPRAZOLE 40 MG/1
40 CAPSULE, DELAYED RELEASE ORAL
Qty: 30 CAPSULE | Refills: 3 | Status: SHIPPED | OUTPATIENT
Start: 2024-04-22

## 2024-04-22 RX ORDER — LIDOCAINE 36 MG/1
PATCH TOPICAL
COMMUNITY
Start: 2024-04-12

## 2024-04-22 RX ORDER — FAMOTIDINE 20 MG/1
20 TABLET, FILM COATED ORAL 2 TIMES DAILY
COMMUNITY
Start: 2024-04-15 | End: 2024-04-22

## 2024-04-22 RX ORDER — DIAZEPAM 10 MG/1
TABLET ORAL
COMMUNITY
Start: 2024-04-12

## 2024-04-22 NOTE — PROGRESS NOTES
[facility] - Outpatient Follow-up Note  Shaheen Jones 58 y.o. male MRN: [id number]  Encounter: [id number]          ASSESSMENT AND PLAN:      58-year-old male with GERD, ECTOR who presents the office for abdominal pain x 1 week.    Epigastric pain  Patient with gastroenteritis last week with nausea, vomiting, diarrhea, leukocytosis and fever.  CT was unremarkable.  Vomiting and diarrhea has resolved however having persistent epigastric discomfort, described as a burning sensation.  Suspect gastritis in the setting of suspected recent GI illness.    -Recommend continue supportive care.   -Recommend starting Prilosec 40 mg daily in the morning before breakfast.  - Continue Pepcid 20 mg twice daily.  - Recommend Carafate before meals.  -Avoid NSAIDs.    -No indication for stool test at this time as diarrhea has resolved.  - Patient inquired about receiving antibiotics.  Advised no current indication for this.    -Patient requesting work note for the week.  Advised I will provide this until Wednesday.  He can call back if no improvement of symptoms with above.    -Advised patient to reach out if no improvement of symptoms over the next few weeks.  Would recommend EGD at that time.  Will hold off at this time as symptoms occurred acutely at the same time as gastroenteritis x 1 week with no alarm symptoms or labs.      Patient advised to reach out via [employer] or call the office with any concerns over the next few weeks.   ______________________________________________________________________    SUBJECTIVE:      Shaheen Jones is a 58 y.o. male with GERD, idiopathic intracranial hypertension, ECTOR who presents the office for follow-up for abdominal pain.    Patient went to the emergency room 4/14 due to fever nausea vomiting and diarrhea.  He had CT scan which was unremarkable.  Lab work initially showed leukocytosis and elevated hemoglobin.  This has since been rechecked and is normal.  Liver  enzymes normal.  Lipase normal.  Symptoms consistent with gastroenteritis.  He followed up with urgent care due to no improvement of symptoms and requested H. pylori testing.  This was ordered and sent, results pending.    Patient reports persistent epigastric pain.  He reports resolution of vomiting.  He is sticking with a bland diet.  He reports due to his initial diarrhea he was taking Imodium which subsequently caused constipation.  He took Dulcolax due to this and now had diarrhea today.  He reports symptoms initially occurred after eating seafood.  He is staying hydrated with Gatorade.  He is taking Pepcid 20 mg twice daily without significant improvement of symptoms.    Last colonoscopy 2/2022 with left-sided diverticulosis and repeat recommended in 5 years due to history of polyps.  EGD 1/2019 with mild gastritis in his GE junction but otherwise unremarkable.  I am unable to find biopsy results.    REVIEW OF SYSTEMS IS OTHERWISE NEGATIVE.      Historical Information   Past Medical History:   Diagnosis Date    Anxiety     Depression     GERD (gastroesophageal reflux disease)     Kidney stone     Kidney stones     Migraine      Past Surgical History:   Procedure Laterality Date    CHOLECYSTECTOMY      CYSTOSCOPY      FL RETROGRADE PYELOGRAM  9/9/2021    FL RETROGRADE PYELOGRAM  5/9/2022    INGUINAL HERNIA REPAIR Bilateral     OTHER SURGICAL HISTORY      scar tissue scraped off liver--done at [facility]    FL CYSTO/URETERO W/LITHOTRIPSY &INDWELL STENT INSRT Left 2/28/2021    Procedure: CYSTOSCOPY URETEROSCOPY WITH LITHOTRIPSY HOLMIUM LASER, RETROGRADE PYELOGRAM AND INSERTION STENT URETERAL;  Surgeon: Bari Marina MD;  Location: [facility];  Service: Urology    FL CYSTO/URETERO W/LITHOTRIPSY &INDWELL STENT INSRT Right 9/9/2021    Procedure: CYSTOSCOPY, RIGHT URETEROSCOPY,  RIGHT RETROGRADE, HOLMIUM LASER, STONE BASKET AND RIGHT URETERAL STENT PLACEMENT;  Surgeon: Bari Marina MD;  Location: [facility] OR;   "Service: Urology    NE CYSTO/URETERO W/LITHOTRIPSY &INDWELL STENT INSRT Left 5/9/2022    Procedure: CYSTOSCOPY URETEROSCOPY WITH LITHOTRIPSY HOLMIUM LASER, RETROGRADE PYELOGRAM, BASKET AND INSERTION STENT URETERAL;  Surgeon: Bari Marina MD;  Location: [facility];  Service: Urology    TONSILLECTOMY AND ADENOIDECTOMY      WISDOM TOOTH EXTRACTION       Social History   Social History     Substance and Sexual Activity   Alcohol Use Yes     Social History     Substance and Sexual Activity   Drug Use Never     Social History     Tobacco Use   Smoking Status Never   Smokeless Tobacco Never     Family History   Problem Relation Age of Onset    Cancer Father         prostate    Diabetes Father     Hypertension Father     Lung cancer Father        Meds/Allergies       Current Outpatient Medications:     diazepam (VALIUM) 10 mg tablet    famotidine (PEPCID) 20 mg tablet    fluticasone (FLONASE) 50 mcg/act nasal spray    HYDROcodone-acetaminophen (NORCO) 5-325 mg per tablet    methocarbamol (ROBAXIN) 750 mg tablet    Multiple Vitamin (multivitamin) tablet    omeprazole (PriLOSEC) 40 MG capsule    sildenafil (VIAGRA) 100 mg tablet    sucralfate (CARAFATE) 1 g tablet    ZTlido 1.8 % PTCH    escitalopram (LEXAPRO) 10 mg tablet    hydrOXYzine HCL (ATARAX) 10 mg tablet    ketorolac (TORADOL) 10 mg tablet    methylPREDNISolone 4 MG tablet therapy pack    oxybutynin (DITROPAN-XL) 10 MG 24 hr tablet    phenazopyridine (PYRIDIUM) 200 mg tablet    prednisoLONE acetate (PRED FORTE) 1 % ophthalmic suspension    rimegepant sulfate (Nurtec) 75 mg TBDP    rosuvastatin (CRESTOR) 5 mg tablet    sulfamethoxazole-trimethoprim (BACTRIM DS) 800-160 mg per tablet    tamsulosin (FLOMAX) 0.4 mg    Allergies   Allergen Reactions    Keflex [Cephalexin] Rash           Objective     Blood pressure 138/84, pulse (!) 118, height 5' 7\" (1.702 m), weight 108 kg (238 lb 3.2 oz), SpO2 97%. Body mass index is 37.31 kg/m².      PHYSICAL EXAM:      General " Appearance:   Alert, cooperative, no distress   HEENT:   Normocephalic, atraumatic, anicteric.     Neck:  Supple, symmetrical, trachea midline   Lungs:   Clear to auscultation bilaterally; no rales, rhonchi or wheezing; respirations unlabored    Heart::   Regular rate and rhythm; no murmur, rub, or gallop.   Abdomen:   Reports some tenderness with palpation in the right upper quadrant area.  Abdomen is soft, flat, nondistended.  No rigidity or guarding   Genitalia:   Deferred    Rectal:   Deferred    Extremities:  No cyanosis, clubbing or edema    Pulses:  2+ and symmetric    Skin:  No jaundice, rashes, or lesions    Lymph nodes:  No palpable cervical lymphadenopathy        Lab Results:   No visits with results within 1 Day(s) from this visit.   Latest known visit with results is:   Appointment on 04/04/2023   Component Date Value    CRP 04/04/2023 3.2 (H)     Sed Rate 04/04/2023 8     Lyme Total Antibodies 04/04/2023 <0.2     A/G Ratio 04/04/2023 1.64     Albumin Electrophoresis 04/04/2023 62.1     Albumin CONC 04/04/2023 4.41     Alpha 1 04/04/2023 3.8     ALPHA 1 CONC 04/04/2023 0.27     Alpha 2 04/04/2023 11.0     ALPHA 2 CONC 04/04/2023 0.78     Beta-1 04/04/2023 5.7     BETA 1 CONC 04/04/2023 0.40     Beta-2 04/04/2023 5.2     BETA 2 CONC 04/04/2023 0.37     Gamma Globulin 04/04/2023 12.2     GAMMA CONC 04/04/2023 0.87     Total Protein 04/04/2023 7.1     SPEP Interpretation 04/04/2023 See Comment     Sodium 04/04/2023 136     Potassium 04/04/2023 3.8     Chloride 04/04/2023 109 (H)     CO2 04/04/2023 24     ANION GAP 04/04/2023 3 (L)     BUN 04/04/2023 22     Creatinine 04/04/2023 1.19     Glucose 04/04/2023 132     Calcium 04/04/2023 9.0     AST 04/04/2023 24     ALT 04/04/2023 35     Alkaline Phosphatase 04/04/2023 71     Total Protein 04/04/2023 7.4     Albumin 04/04/2023 4.2     Total Bilirubin 04/04/2023 0.56     eGFR 04/04/2023 66          Radiology Results:   CTA abdomen pelvis w wo contrast    Result  Date: 4/14/2024  Narrative: History: Abdominal pain   Exam: CT of the abdomen and pelvis with IV contrast.   Technique: Using helical technique, axial images were obtained through the abdomen and pelvis following administration of 90 cc of Omnipaque 350 intravenous contrast. Coronal and sagittal reformations were performed.   Comparison: CT abdomen and pelvis 12/31/2023     Abdomen: Lung Bases: Normal. Liver: Normal. Gallbladder/Bile ducts: Cholecystectomy Spleen: Normal. Pancreas: Normal. Adrenal glands: Normal. Kidneys/Ureters: Small right renal calculi. No ureteral calculi or hydronephrosis. There are a few small renal cysts. Bowel/Mesentery: No evidence of bowel obstruction or acute inflammatory process. Normal appendix. Lymph nodes: Normal. Vessels: Normal. Abdominal Wall: Probable small fat-containing right inguinal hernia   Pelvis: No acute pelvic pathology Urinary Bladder: Normal. Lymph nodes:  Normal.   Bones: Normal.      Impression: Impression: No acute findings. Workstation:[id number]    XR sacroiliac joints 3+ views    Result Date: 4/4/2024  Narrative: History: Chronic pain Study: XR KNEE 1 OR 2 VW LEFT, XR SACROILIAC JOINTS Comparison: None    Impression: Findings/impression: Bilateral SI joints: Both SI joints are normal with no erosions or ankylosis. Left knee: Mild medial and patellofemoral osteoarthritic changes. No acute fracture, malalignment or effusion. Workstation:[id number]    XR knee 1 or 2 vw left    Result Date: 4/4/2024  Narrative: History: Chronic pain Study: XR KNEE 1 OR 2 VW LEFT, XR SACROILIAC JOINTS Comparison: None    Impression: Findings/impression: Bilateral SI joints: Both SI joints are normal with no erosions or ankylosis. Left knee: Mild medial and patellofemoral osteoarthritic changes. No acute fracture, malalignment or effusion. Workstation:[id number]

## 2024-04-23 ENCOUNTER — NURSE TRIAGE (OUTPATIENT)
Age: 59
End: 2024-04-23

## 2024-04-23 NOTE — TELEPHONE ENCOUNTER
"Last ov 4/22/24 RAVI Del Real,     I returned call, patient was told to call back if no improvement. He states he continues with pain as at visit yesterday. I confirmed he started the PPI and explained it may take a week of so to see affect. He is following all recommendations. Please advise.    Reason for Disposition   Mild abdominal pain    Answer Assessment - Initial Assessment Questions  1. LOCATION: \"Where does it hurt?\"       Same area as noted at yesterday's visit    Protocols used: Abdominal Pain - Male-ADULT-[place]    "

## 2024-04-25 NOTE — TELEPHONE ENCOUNTER
Called and left patient a message regarding PA's recommendations. Instructed patient to call with any further questions or concerns or if symptoms are worse. Call back number left in the message.

## 2024-04-25 NOTE — TELEPHONE ENCOUNTER
Pt returning a call to review recommendations. Call was transferred to [facility].

## 2024-04-25 NOTE — TELEPHONE ENCOUNTER
Patient called back in with on going severe upper abd pain taking Vicodin to manage the pain.  Calling in to schedule the EGD.  This procedure needs to be ordered prior to it being scheduled.

## 2024-04-26 ENCOUNTER — PREP FOR PROCEDURE (OUTPATIENT)
Dept: GASTROENTEROLOGY | Facility: AMBULARY SURGERY CENTER | Age: 59
End: 2024-04-26

## 2024-04-26 DIAGNOSIS — R10.13 EPIGASTRIC PAIN: Primary | ICD-10-CM

## 2024-04-26 DIAGNOSIS — R11.2 NAUSEA AND VOMITING, UNSPECIFIED VOMITING TYPE: ICD-10-CM

## 2024-06-13 DIAGNOSIS — K29.70 GASTRITIS WITHOUT BLEEDING, UNSPECIFIED CHRONICITY, UNSPECIFIED GASTRITIS TYPE: ICD-10-CM

## 2024-06-13 RX ORDER — SUCRALFATE 1 G/1
1 TABLET ORAL
Qty: 90 TABLET | Refills: 5 | Status: SHIPPED | OUTPATIENT
Start: 2024-06-13

## 2024-07-08 ENCOUNTER — ANESTHESIA EVENT (OUTPATIENT)
Dept: ANESTHESIOLOGY | Facility: HOSPITAL | Age: 59
End: 2024-07-08

## 2024-07-08 ENCOUNTER — ANESTHESIA (OUTPATIENT)
Dept: ANESTHESIOLOGY | Facility: HOSPITAL | Age: 59
End: 2024-07-08

## 2024-07-15 ENCOUNTER — TELEPHONE (OUTPATIENT)
Dept: GASTROENTEROLOGY | Facility: AMBULARY SURGERY CENTER | Age: 59
End: 2024-07-15

## 2024-07-21 RX ORDER — SODIUM CHLORIDE, SODIUM LACTATE, POTASSIUM CHLORIDE, CALCIUM CHLORIDE 600; 310; 30; 20 MG/100ML; MG/100ML; MG/100ML; MG/100ML
50 INJECTION, SOLUTION INTRAVENOUS CONTINUOUS
OUTPATIENT
Start: 2024-07-21

## 2024-08-01 ENCOUNTER — TELEPHONE (OUTPATIENT)
Dept: GASTROENTEROLOGY | Facility: AMBULARY SURGERY CENTER | Age: 59
End: 2024-08-01

## 2024-08-20 DIAGNOSIS — K29.70 GASTRITIS WITHOUT BLEEDING, UNSPECIFIED CHRONICITY, UNSPECIFIED GASTRITIS TYPE: ICD-10-CM

## 2024-08-20 RX ORDER — OMEPRAZOLE 40 MG/1
40 CAPSULE, DELAYED RELEASE ORAL
Qty: 30 CAPSULE | Refills: 5 | Status: SHIPPED | OUTPATIENT
Start: 2024-08-20

## (undated) DEVICE — SHEATH URETHERAL ACCESS FLEXOR 12FR 35CM

## (undated) DEVICE — POUCH UR CATCHER STERILE

## (undated) DEVICE — URETERAL CATH 5 FR

## (undated) DEVICE — FABRIC REINFORCED, SURGICAL GOWN, XL: Brand: CONVERTORS

## (undated) DEVICE — NGAGE, NITINOL STONE EXTRACTOR: Brand: NGAGE

## (undated) DEVICE — GLOVE SRG BIOGEL 8

## (undated) DEVICE — SPONGE STICK WITH PVP-I: Brand: KENDALL

## (undated) DEVICE — GUIDEWIRE STRGHT TIP 0.038 IN SOLO PLUS

## (undated) DEVICE — CYSTOSCOPY PACK: Brand: CONVERTORS

## (undated) DEVICE — CYSTO TUBING TUR Y IRRIGATION

## (undated) DEVICE — BOWL: 16OZ PEELPOUCH 75/CS 16/PLT: Brand: MEDEGEN MEDICAL PRODUCTS, LLC

## (undated) DEVICE — LUBRICANT SURGILUBE TUBE 4 OZ  FLIP TOP

## (undated) DEVICE — SEAL ADJUST BIOPSY PORT Y ADAPTOR

## (undated) DEVICE — TOWEL SET X-RAY

## (undated) DEVICE — PROVE COVER: Brand: UNBRANDED

## (undated) DEVICE — LASER FIBER HOLMIUM 272MICRON

## (undated) DEVICE — Device

## (undated) DEVICE — RADIOLOGY STERILE LABELS: Brand: CENTURION

## (undated) DEVICE — GAUZE,SPONGE,2"X2",8PLY,STERILE,LF,2'S: Brand: MEDLINE

## (undated) DEVICE — SHEATH URETERAL ACCESS 10/12FR 45CM PROXIS

## (undated) DEVICE — SEAL BIOPSY PORT ADJUST F/ACCESSORIES UP TO 3FR

## (undated) DEVICE — NGAGE, NITINOL STONE EXTRACTOR MODIFIED BASKET: Brand: NGAGE

## (undated) DEVICE — ENDOSCOPIC VALVE WITH ADAPTER.: Brand: SURSEAL® II